# Patient Record
Sex: MALE | Race: WHITE | Employment: OTHER | URBAN - NONMETROPOLITAN AREA
[De-identification: names, ages, dates, MRNs, and addresses within clinical notes are randomized per-mention and may not be internally consistent; named-entity substitution may affect disease eponyms.]

---

## 2018-06-05 ENCOUNTER — HOSPITAL ENCOUNTER (OUTPATIENT)
Dept: GENERAL RADIOLOGY | Age: 83
Discharge: HOME | End: 2018-06-05
Payer: MEDICARE

## 2018-06-05 ENCOUNTER — HOSPITAL ENCOUNTER (OUTPATIENT)
Age: 83
Discharge: HOME | End: 2018-06-05
Payer: MEDICARE

## 2018-06-05 DIAGNOSIS — M54.5 LOW BACK PAIN, UNSPECIFIED BACK PAIN LATERALITY, UNSPECIFIED CHRONICITY, WITH SCIATICA PRESENCE UNSPECIFIED: ICD-10-CM

## 2018-06-05 PROCEDURE — 72100 X-RAY EXAM L-S SPINE 2/3 VWS: CPT

## 2018-06-20 ENCOUNTER — HOSPITAL ENCOUNTER (OUTPATIENT)
Dept: MRI IMAGING | Age: 83
Discharge: HOME | End: 2018-06-20
Payer: MEDICARE

## 2018-06-20 DIAGNOSIS — R93.7 ABNORMAL X-RAY OF LUMBAR SPINE: ICD-10-CM

## 2018-06-20 DIAGNOSIS — M54.40 ACUTE BACK PAIN WITH SCIATICA, UNSPECIFIED LATERALITY: ICD-10-CM

## 2018-06-20 PROCEDURE — 72148 MRI LUMBAR SPINE W/O DYE: CPT

## 2018-07-09 RX ORDER — METHYLPREDNISOLONE ACETATE 80 MG/ML
80 INJECTION, SUSPENSION INTRA-ARTICULAR; INTRALESIONAL; INTRAMUSCULAR; SOFT TISSUE ONCE
Status: CANCELLED | OUTPATIENT
Start: 2018-07-09 | End: 2018-07-09

## 2018-07-09 RX ORDER — BUPIVACAINE HYDROCHLORIDE 2.5 MG/ML
5 INJECTION, SOLUTION EPIDURAL; INFILTRATION; INTRACAUDAL ONCE
Status: CANCELLED | OUTPATIENT
Start: 2018-07-09 | End: 2018-07-09

## 2018-07-10 ENCOUNTER — HOSPITAL ENCOUNTER (OUTPATIENT)
Dept: INTERVENTIONAL RADIOLOGY/VASCULAR | Age: 83
Discharge: HOME | End: 2018-07-10
Payer: MEDICARE

## 2018-07-10 VITALS
SYSTOLIC BLOOD PRESSURE: 137 MMHG | HEART RATE: 63 BPM | TEMPERATURE: 98.2 F | DIASTOLIC BLOOD PRESSURE: 82 MMHG | RESPIRATION RATE: 16 BRPM | OXYGEN SATURATION: 95 %

## 2018-07-10 PROCEDURE — 6360000002 HC RX W HCPCS

## 2018-07-10 PROCEDURE — 6360000004 HC RX CONTRAST MEDICATION: Performed by: RADIOLOGY

## 2018-07-10 PROCEDURE — 62323 NJX INTERLAMINAR LMBR/SAC: CPT | Performed by: RADIOLOGY

## 2018-07-10 PROCEDURE — 2500000003 HC RX 250 WO HCPCS

## 2018-07-10 RX ORDER — BUPIVACAINE HYDROCHLORIDE 2.5 MG/ML
5 INJECTION, SOLUTION EPIDURAL; INFILTRATION; INTRACAUDAL ONCE
Status: COMPLETED | OUTPATIENT
Start: 2018-07-10 | End: 2018-07-10

## 2018-07-10 RX ORDER — LISINOPRIL 2.5 MG/1
2.5 TABLET ORAL DAILY
Status: ON HOLD | COMMUNITY
End: 2021-06-14 | Stop reason: ALTCHOICE

## 2018-07-10 RX ORDER — BENZONATATE 100 MG/1
200 CAPSULE ORAL 3 TIMES DAILY PRN
Status: ON HOLD | COMMUNITY
End: 2018-10-29 | Stop reason: ALTCHOICE

## 2018-07-10 RX ORDER — CETIRIZINE HYDROCHLORIDE 10 MG/1
10 TABLET ORAL DAILY
Status: ON HOLD | COMMUNITY
End: 2018-10-29 | Stop reason: ALTCHOICE

## 2018-07-10 RX ORDER — SPIRONOLACTONE 25 MG
20 TABLET ORAL
COMMUNITY

## 2018-07-10 RX ORDER — GLIMEPIRIDE 4 MG/1
2 TABLET ORAL NIGHTLY
COMMUNITY

## 2018-07-10 RX ORDER — IPRATROPIUM BROMIDE 42 UG/1
2 SPRAY, METERED NASAL 4 TIMES DAILY
Status: ON HOLD | COMMUNITY
End: 2018-10-29 | Stop reason: ALTCHOICE

## 2018-07-10 RX ORDER — METHYLPREDNISOLONE ACETATE 80 MG/ML
80 INJECTION, SUSPENSION INTRA-ARTICULAR; INTRALESIONAL; INTRAMUSCULAR; SOFT TISSUE ONCE
Status: COMPLETED | OUTPATIENT
Start: 2018-07-10 | End: 2018-07-10

## 2018-07-10 RX ADMIN — IOHEXOL 1 ML: 180 INJECTION INTRAVENOUS at 15:05

## 2018-07-10 RX ADMIN — METHYLPREDNISOLONE ACETATE 80 MG: 80 INJECTION, SUSPENSION INTRA-ARTICULAR; INTRALESIONAL; INTRAMUSCULAR; SOFT TISSUE at 15:06

## 2018-07-10 RX ADMIN — BUPIVACAINE HYDROCHLORIDE 2 ML: 2.5 INJECTION, SOLUTION EPIDURAL; INFILTRATION; INTRACAUDAL at 15:06

## 2018-07-10 ASSESSMENT — PAIN DESCRIPTION - PAIN TYPE
TYPE: CHRONIC PAIN
TYPE: CHRONIC PAIN

## 2018-07-10 ASSESSMENT — PAIN DESCRIPTION - ORIENTATION
ORIENTATION: LEFT;LOWER
ORIENTATION: LEFT;LOWER

## 2018-07-10 ASSESSMENT — PAIN DESCRIPTION - LOCATION
LOCATION: BACK;BUTTOCKS
LOCATION: BACK;BUTTOCKS

## 2018-07-10 ASSESSMENT — PAIN SCALES - GENERAL
PAINLEVEL_OUTOF10: 0
PAINLEVEL_OUTOF10: 0
PAINLEVEL_OUTOF10: 2

## 2018-07-10 ASSESSMENT — PAIN DESCRIPTION - DESCRIPTORS
DESCRIPTORS: DISCOMFORT
DESCRIPTORS: ACHING
DESCRIPTORS: ACHING

## 2018-07-10 ASSESSMENT — PAIN - FUNCTIONAL ASSESSMENT: PAIN_FUNCTIONAL_ASSESSMENT: 0-10

## 2018-07-10 NOTE — PROGRESS NOTES
1540 Pt denies new numbness. Pt ambulating same as prior to procedure with no complaints. Vitals stable. bandaid clean, dry and intact. 1545 pt now having complaints of numbness in bilateral back of thighs. Pt advised to sit down. Pt states it doesn't effect his walking he just feels the numbness. 1600 pt still having complaints of slight numbness. Walking not effected. [facility] called. Pt educated and verbalized understanding. Pt discharged via wheelchair to [facility] with instructions.

## 2018-07-18 ENCOUNTER — HOSPITAL ENCOUNTER (OUTPATIENT)
Dept: PHYSICAL THERAPY | Age: 83
Setting detail: THERAPIES SERIES
Discharge: HOME | End: 2018-07-18
Payer: MEDICARE

## 2018-07-18 PROCEDURE — G8979 MOBILITY GOAL STATUS: HCPCS

## 2018-07-18 PROCEDURE — G8978 MOBILITY CURRENT STATUS: HCPCS

## 2018-07-18 PROCEDURE — 97110 THERAPEUTIC EXERCISES: CPT

## 2018-07-18 PROCEDURE — 97161 PT EVAL LOW COMPLEX 20 MIN: CPT
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
## 2018-07-25 ENCOUNTER — HOSPITAL ENCOUNTER (OUTPATIENT)
Dept: PHYSICAL THERAPY | Age: 83
Setting detail: THERAPIES SERIES
Discharge: HOME | End: 2018-07-25
Payer: MEDICARE

## 2018-07-25 ENCOUNTER — APPOINTMENT (OUTPATIENT)
Dept: PHYSICAL THERAPY | Age: 83
End: 2018-07-25
Payer: MEDICARE

## 2018-07-25 PROCEDURE — 97110 THERAPEUTIC EXERCISES: CPT

## 2018-07-25 NOTE — PROGRESS NOTES
[place]     Time In: 1430  Time Out: 8549  Minutes: 45  Timed Code Treatment Minutes: 45 Minutes     Date: 2018  Patient Name: Idalia Francisco,  Gender:  male        CSN: [id number]   : 1935  (80 y.o.)       Referring Practitioner: Jason Fish MD      Diagnosis: M54.40- Lumbago with sciatica, unspecified side; acute low back pain; R93.7- abnormal MRI, lumbar spine  Treatment Diagnosis: chronic lumbar pain, core and hip weakness   Additional Pertinent Hx: Macular degeneration with right eye blindness, HTN controlled with medication, diabetes. General:  PT Visit Information  Onset Date: 18  PT Insurance Information: Medicare- [employer], unlimited visits based on medical necessity and cap; aquatics and modalities covered except ionto, HP/CP  Total # of Visits to Date: 2  Plan of Care/Certification Expiration Date: 18  Progress Note Counter:  for PN. Subjective:  Chart Reviewed: Yes  Patient assessed for rehabilitation services?: Yes  Response To Previous Treatment: Patient with no complaints from previous session. Family / Caregiver Present: No  Comments: Ortho neuro appt 18. MRI showed bulging disc and arthritis. Follow up with Dr. Oar Cai every 6 months. Subjective: Patient states his pain is when he does prolong walking. Pain is to left buttock area. When sitting he has no pain. Patient states piriformis stretch increases pain. Pain:  Patient Currently in Pain: Yes  Objective    Exercises  Exercise 1: HOLD increase pain per patient. L piriformis (knee to opp axilla) 3x15 sec hold  Exercise 2: On moist heat: Abdominal bracing 10 x5 sec hold, UE x 10, with march 2 x5, adductor squeezes with ball x 10 hold x 5, SLR, heel slides x10, abduction green x 10 (issued for home)  Exercise 4: Education: Log Roll transfer, use bracing with bed mobility and sit to stand. Seated posture with towel roll. Exercise 5: Seated DLS: abdominal bracing x 10 hold x 5. Activity Tolerance: Tolerated well with no pain after session. Assessment: Body structures, Functions, Activity limitations: Decreased functional mobility , Decreased ROM, Decreased strength, Decreased endurance  Assessment: Progressed core strengthening with good tolerance and no pain. HEP issued along with green theraband for hip abduction in hooklying. Discharge Recommendations: Continue to assess pending progress    Patient Education:  Patient Education: DLS in supine and seated. Log Roll transfer, Use bracing at home. Plan:  Times per week: 2x  Plan weeks: 8 weeks  Specific instructions for Next Treatment: progress to seated and standing DLS, review HEP  Current Treatment Recommendations: Strengthening, Manual Therapy - Soft Tissue Mobilization, Modalities, Home Exercise Program, Patient/Caregiver Education & Training, Aquatics  Plan Comment: Continue per POC. Goals:  Patient goals : Decrease/get rid of pain    Short term goals  Time Frame for Short term goals: 4 weeks  Short term goal 1: Pt will report <2/10 lumbar pain without L thigh numbness at worst to tolerate static standing and walking >100 yards. Short term goal 2: Pt will improve B hip strength to 5/5 for stabilization when lifting and climbing stairs. Short term goal 3: Pt will demo good core engagement and postural awareness with <2 verbal cues during therapy session to carry over to standing and walking to tolerate longer durations. Long term goals  Time Frame for Long term goals : 8 weeks  Long term goal 1: Pt will be independent and compliant with HEP to achieve above goals. Long term goal 2: Pt will improve Oswestry score from 11% to 5%.   Tiffany García  PTA [id number]

## 2018-07-26 ENCOUNTER — HOSPITAL ENCOUNTER (OUTPATIENT)
Dept: INTERVENTIONAL RADIOLOGY/VASCULAR | Age: 83
Discharge: HOME | End: 2018-07-26
Payer: MEDICARE

## 2018-07-26 DIAGNOSIS — I70.213 INTERMITTENT CLAUDICATION OF BOTH LOWER EXTREMITIES DUE TO ATHEROSCLEROSIS (HCC): ICD-10-CM

## 2018-07-26 PROCEDURE — 93923 UPR/LXTR ART STDY 3+ LVLS: CPT

## 2018-07-27 ENCOUNTER — HOSPITAL ENCOUNTER (OUTPATIENT)
Dept: PHYSICAL THERAPY | Age: 83
Setting detail: THERAPIES SERIES
Discharge: HOME | End: 2018-07-27
Payer: MEDICARE

## 2018-07-27 PROCEDURE — 97110 THERAPEUTIC EXERCISES: CPT

## 2018-07-27 NOTE — PROGRESS NOTES
[address]     Time In: 6025  Time Out: [address] [facility]  Minutes: 38  Timed Code Treatment Minutes: 38 Minutes     Date: 2018  Patient Name: Jojo Calzada,  Gender:  male        CSN: [id number]   : 1935  (80 y.o.)       Referring Practitioner: Nahid Quiñones MD      Diagnosis: M54.40- Lumbago with sciatica, unspecified side; acute low back pain; R93.7- abnormal MRI, lumbar spine  Treatment Diagnosis: chronic lumbar pain, core and hip weakness   Additional Pertinent Hx: Macular degeneration with right eye blindness, HTN controlled with medication, diabetes. General:  PT Visit Information  Onset Date: 18  PT Insurance Information: Medicare- [employer], unlimited visits based on medical necessity and cap; aquatics and modalities covered except ionto, HP/CP  Total # of Visits to Date: 3  Plan of Care/Certification Expiration Date: 18  Progress Note Counter: 3/9 for PN. Subjective:  Chart Reviewed: Yes  Patient assessed for rehabilitation services?: Yes  Response To Previous Treatment: Patient with no complaints from previous session. Family / Caregiver Present: No  Comments: Ortho neuro appt 18. MRI showed bulging disc and arthritis. Follow up with Dr. Toma Brown every 6 months. Subjective: I didn't do my exs yesterday. I was too busy. I don't have any pain now, but it was about 2/10 walking in here. Mild \"funny\" feeling L lateral calf. Pain:  Patient Currently in Pain: No         Objective         Exercises  Exercise 1: HOLD increase pain per patient. L piriformis (knee to opp axilla) 3x15 sec hold  DID do modified in sitting LLE opposite knee to opposire shoulder x 3 hold 10 sec.    Exercise 2: On moist heat: Abdominal bracing 10 x5 sec hold, UE x 10, with march 2 x5, adductor squeezes with ball x 10 hold x 5, SLR, heel slides x10, abduction green x 15 (issued for home)  Exercise 3: Nustep S8 , A11 L1 x5 min with bracing New machine   Exercise 4: Education: Log Roll transfer, use bracing with bed mobility and sit to stand. Seated posture with towel roll reviewed- pt not \"doing yet. \"  Exercise 5: Seated with MHP at LB : DLS: abdominal bracing with marching and LAQ x 10 B         Activity Tolerance:  Activity Tolerance: Patient Tolerated treatment well  Activity Tolerance: Mild discomfort with piriformis stretch. Performed modified in chair. \"It's not too bad. \"    Assessment: Body structures, Functions, Activity limitations: Decreased functional mobility , Decreased ROM, Decreased strength, Decreased endurance  Assessment: No increased LBP or radicaulr symptoms throughout session. Modified piriformis stretch performed seated. Monitor pain and consistency with HEP emphasized. Prognosis: Good  Discharge Recommendations: Continue to assess pending progress    Patient Education:  Patient Education: HEP, heat, posture-use towel roll. Seated marching and LAQ with abd bracing                       Plan:  Times per week: 2x  Plan weeks: 8 weeks  Specific instructions for Next Treatment: progress to seated and standing DLS, review HEP  Current Treatment Recommendations: Strengthening, Manual Therapy - Soft Tissue Mobilization, Modalities, Home Exercise Program, Patient/Caregiver Education & Training, Aquatics  Plan Comment: Continue per POC. Goals:  Patient goals : Decrease/get rid of pain    Short term goals  Time Frame for Short term goals: 4 weeks  Short term goal 1: Pt will report <2/10 lumbar pain without L thigh numbness at worst to tolerate static standing and walking >100 yards. Short term goal 2: Pt will improve B hip strength to 5/5 for stabilization when lifting and climbing stairs.    Short term goal 3: Pt will demo good core engagement and postural awareness with <2 verbal cues during therapy session to carry over to standing and walking to tolerate longer

## 2018-07-30 ENCOUNTER — HOSPITAL ENCOUNTER (OUTPATIENT)
Dept: PHYSICAL THERAPY | Age: 83
Setting detail: THERAPIES SERIES
Discharge: HOME | End: 2018-07-30
Payer: MEDICARE

## 2018-07-30 PROCEDURE — 97110 THERAPEUTIC EXERCISES: CPT

## 2018-07-30 NOTE — PROGRESS NOTES
achieve above goals. Long term goal 2: Pt will improve Oswestry score from 11% to 5%.          Pepe Gutiérrez, PT

## 2018-08-01 ENCOUNTER — HOSPITAL ENCOUNTER (OUTPATIENT)
Dept: PHYSICAL THERAPY | Age: 83
Setting detail: THERAPIES SERIES
Discharge: HOME | End: 2018-08-01
Payer: MEDICARE

## 2018-08-01 PROCEDURE — 97110 THERAPEUTIC EXERCISES: CPT

## 2018-08-01 NOTE — PROGRESS NOTES
not \"doing yet. \"  Exercise 5: Seated with MHP at LB :  abdominal bracing with marching and LAQ x 10 B, hip abd with green t band x 15, hip add with ball x 15  Exercise 6: Standing with bracing: heel  raisess, march, 3 way hip, mini-squats x10 each          Activity Tolerance:  Activity Tolerance: Patient Tolerated treatment well  Activity Tolerance: 0 pain, radicular symptoms    Assessment: Body structures, Functions, Activity limitations: Decreased functional mobility , Decreased ROM, Decreased strength, Decreased endurance  Assessment: Updated HEP. Occ cues for abd bracing throughout session. Good log roll technque demo. Prognosis: Good  Discharge Recommendations: Continue to assess pending progress    Patient Education:  Patient Education: HEP, abd bracing, use of heat, monitor pain/symptoms                      Plan:  Times per week: 2x  Plan weeks: 8 weeks  Current Treatment Recommendations: Strengthening, Manual Therapy - Soft Tissue Mobilization, Modalities, Home Exercise Program, Patient/Caregiver Education & Training, Aquatics  Plan Comment: Continue per POC. Goals:  Patient goals : Decrease/get rid of pain    Short term goals  Time Frame for Short term goals: 4 weeks  Short term goal 1: Pt will report <2/10 lumbar pain without L thigh numbness at worst to tolerate static standing and walking >100 yards. Short term goal 2: Pt will improve B hip strength to 5/5 for stabilization when lifting and climbing stairs. Short term goal 3: Pt will demo good core engagement and postural awareness with <2 verbal cues during therapy session to carry over to standing and walking to tolerate longer durations. Long term goals  Time Frame for Long term goals : 8 weeks  Long term goal 1: Pt will be independent and compliant with HEP to achieve above goals. Long term goal 2: Pt will improve Oswestry score from 11% to 5%.          Beck Jean  [id number]

## 2018-08-06 ENCOUNTER — APPOINTMENT (OUTPATIENT)
Dept: PHYSICAL THERAPY | Age: 83
End: 2018-08-06
Payer: MEDICARE

## 2018-08-08 ENCOUNTER — HOSPITAL ENCOUNTER (OUTPATIENT)
Dept: PHYSICAL THERAPY | Age: 83
Setting detail: THERAPIES SERIES
Discharge: HOME | End: 2018-08-08
Payer: MEDICARE

## 2018-08-08 PROCEDURE — 97110 THERAPEUTIC EXERCISES: CPT

## 2018-08-08 NOTE — PROGRESS NOTES
[place]     Time In: 1300  Time Out: [address] [facility]  Minutes: 40  Timed Code Treatment Minutes: 40 Minutes     Date: 2018  Patient Name: Massimo Olivarez,  Gender:  male        CSN: [id number]   : 1935  (80 y.o.)       Referring Practitioner: Sydney Mercado MD      Diagnosis: M54.40- Lumbago with sciatica, unspecified side; acute low back pain; R93.7- abnormal MRI, lumbar spine  Treatment Diagnosis: chronic lumbar pain, core and hip weakness   Additional Pertinent Hx: Macular degeneration with right eye blindness, HTN controlled with medication, diabetes. General:  PT Visit Information  Onset Date: 18  PT Insurance Information: Medicare- [facility], unlimited visits based on medical necessity and cap; aquatics and modalities covered except ionto, HP/CP  Total # of Visits to Date: 6  Plan of Care/Certification Expiration Date: 18  Progress Note Counter:  for PN. Subjective:  Chart Reviewed: Yes  Patient assessed for rehabilitation services?: Yes  Response To Previous Treatment: Patient with no complaints from previous session. Family / Caregiver Present: No  Comments: Ortho neuro appt 18. MRI showed bulging disc and arthritis. Follow up with Dr. Katharine Medeiros every 6 months. Subjective: I don't have any pain right now. o radicualr symptoms. \"Just feels cold above my L ankel. 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 ( thigh). Pain:  Patient Currently in Pain: No         Objective           Exercises  Exercise 2:  Abdominal bracing and  ,   SLR  x10 cues for technique   Exercise 3: Nustep S10, A11 L-2  x 6  min with bracing  Exercise 4: Education: Log Roll transfer, use bracing with bed mobility and sit to stand. Seated posture with towel roll reviewed- pt not \"doing yet. \"  Exercise 5: Seated on silver disc[de-identified]  abdominal bracing with

## 2018-08-10 ENCOUNTER — HOSPITAL ENCOUNTER (OUTPATIENT)
Dept: PHYSICAL THERAPY | Age: 83
Setting detail: THERAPIES SERIES
Discharge: HOME | End: 2018-08-10
Payer: MEDICARE

## 2018-08-10 PROCEDURE — 97110 THERAPEUTIC EXERCISES: CPT

## 2018-08-10 NOTE — PROGRESS NOTES
Exercise 8: hydro hip L- 4 x 15 B abd bracing   Exercise 9: Ended hooklying x 2-3  min  at end monitor LLE pain :I'm just tired. \"          Activity Tolerance:  Activity Tolerance: Patient Tolerated treatment well  Activity Tolerance: 0 pain, radicular symptoms-\"just tired. \"    Assessment: Body structures, Functions, Activity limitations: Decreased functional mobility , Decreased ROM, Decreased strength, Decreased endurance  Assessment: 30 min session today. Pt reports \" no worse pain\" after session. Focus on abd bracing and body mechanics. Stretch and use heat at home tonight. Prognosis: Good       Patient Education:  Patient Education: HEP, abd bracing, use of heat, monitor pain/symptoms           Plan:  Times per week: 2x  Plan weeks: 8 weeks  Current Treatment Recommendations: Strengthening, Manual Therapy - Soft Tissue Mobilization, Modalities, Home Exercise Program, Patient/Caregiver Education & Training, Aquatics  Plan Comment: Continue per POC. Goals:  Patient goals : Decrease/get rid of pain    Short term goals  Time Frame for Short term goals: 4 weeks  Short term goal 1: Pt will report <2/10 lumbar pain without L thigh numbness at worst to tolerate static standing and walking >100 yards. Short term goal 2: Pt will improve B hip strength to 5/5 for stabilization when lifting and climbing stairs. Short term goal 3: Pt will demo good core engagement and postural awareness with <2 verbal cues during therapy session to carry over to standing and walking to tolerate longer durations. Long term goals  Time Frame for Long term goals : 8 weeks  Long term goal 1: Pt will be independent and compliant with HEP to achieve above goals. Long term goal 2: Pt will improve Oswestry score from 11% to 5%.          Nishant Daley  [id number]

## 2018-08-13 ENCOUNTER — HOSPITAL ENCOUNTER (OUTPATIENT)
Dept: PHYSICAL THERAPY | Age: 83
Setting detail: THERAPIES SERIES
Discharge: HOME | End: 2018-08-13
Payer: MEDICARE

## 2018-08-13 PROCEDURE — 97110 THERAPEUTIC EXERCISES: CPT

## 2018-08-13 ASSESSMENT — PAIN DESCRIPTION - PAIN TYPE: TYPE: CHRONIC PAIN

## 2018-08-13 ASSESSMENT — PAIN DESCRIPTION - LOCATION: LOCATION: BACK;BUTTOCKS

## 2018-08-13 ASSESSMENT — PAIN SCALES - GENERAL: PAINLEVEL_OUTOF10: 3

## 2018-08-13 ASSESSMENT — PAIN DESCRIPTION - ORIENTATION: ORIENTATION: LEFT

## 2018-08-13 NOTE — PROGRESS NOTES
[place]     Time In: 1300  Time Out: 1340  Minutes: 40  Timed Code Treatment Minutes: 40 Minutes     Date: 2018  Patient Name: Alan Stoner,  Gender:  male        CSN: [id number]   : 1935  (80 y.o.)       Referring Practitioner: Bryan Canchola MD      Diagnosis: M54.40- Lumbago with sciatica, unspecified side; acute low back pain; R93.7- abnormal MRI, lumbar spine  Treatment Diagnosis: chronic lumbar pain, core and hip weakness   Additional Pertinent Hx: Macular degeneration with right eye blindness, HTN controlled with medication, diabetes. General:  PT Visit Information  Onset Date: 18  PT Insurance Information: Medicare- [employer], unlimited visits based on medical necessity and cap; aquatics and modalities covered except ionto, HP/CP  Total # of Visits to Date: 8  Plan of Care/Certification Expiration Date: 18  Progress Note Counter:  for PN. Subjective:  Chart Reviewed: Yes  Patient assessed for rehabilitation services?: Yes  Response To Previous Treatment: Patient with no complaints from previous session. Family / Caregiver Present: No  Comments: Ortho neuro appt 18. MRI showed bulging disc and arthritis. Follow up with Dr. Sonya García every 6 months. Subjective: Pt reports \"yesterday was better than today. I laid on a heatinig pad today. It just aches. Hurts wehn I walk across the parking lot in my butt. \"     Pain:  Patient Currently in Pain: Yes  Pain Level: 3  Pain Type: Chronic pain  Pain Location: Back, Buttocks  Pain Orientation: Left      Objective             Exercises  Exercise 2:  Abdominal bracing and  march x15, B  SLR  x10 cues for technique   Exercise 3: Nustep S10, A11 L-3 x 7  min with bracing  Exercise 4: Education: Log Roll transfer, use bracing with bed mobility and sit to stand.  Seated posture with towel roll reviewed \"Not doing in

## 2018-08-17 ENCOUNTER — HOSPITAL ENCOUNTER (OUTPATIENT)
Dept: PHYSICAL THERAPY | Age: 83
Setting detail: THERAPIES SERIES
Discharge: HOME | End: 2018-08-17
Payer: MEDICARE

## 2018-08-17 PROCEDURE — G8979 MOBILITY GOAL STATUS: HCPCS

## 2018-08-17 PROCEDURE — G8980 MOBILITY D/C STATUS: HCPCS

## 2018-08-17 PROCEDURE — 97110 THERAPEUTIC EXERCISES: CPT

## 2018-08-17 ASSESSMENT — PAIN DESCRIPTION - ORIENTATION: ORIENTATION: LEFT

## 2018-08-17 ASSESSMENT — PAIN SCALES - GENERAL: PAINLEVEL_OUTOF10: 1

## 2018-08-17 ASSESSMENT — PAIN DESCRIPTION - LOCATION: LOCATION: BACK;BUTTOCKS

## 2018-08-17 NOTE — PROGRESS NOTES
[place]     Time In: 7189  Time Out: [address]  Minutes: 40  Timed Code Treatment Minutes: 40 Minutes     Date: 2018  Patient Name: Areli Casey,  Gender:  male        CSN: [id number]   : 1935  (80 y.o.)       Referring Practitioner: Anastasia Leong MD      Diagnosis: M54.40- Lumbago with sciatica, unspecified side; acute low back pain; R93.7- abnormal MRI, lumbar spine  Treatment Diagnosis: chronic lumbar pain, core and hip weakness   Additional Pertinent Hx: Macular degeneration with right eye blindness, HTN controlled with medication, diabetes. General:  PT Visit Information  Onset Date: 18  PT Insurance Information: Medicare- [facility], unlimited visits based on medical necessity and cap; aquatics and modalities covered except ionto, HP/CP  Total # of Visits to Date: 9  Plan of Care/Certification Expiration Date: 18  Progress Note Counter:  for PN. Subjective:  Chart Reviewed: Yes  Comments: Ortho neuro appt 18. MRI showed bulging disc and arthritis. Follow up with Dr. Gasper Alvarez every 6 months. Subjective: Pt reports feeling tired this morning from traveling to [facility] yesterday to see doctor and attend choir practice, not getting home until late. Pt reports Dr. Larisa Colbert recommends microdiscectomy and pt plans to schedule. Pt reports having lumbar arthritis and stenosis. Pt c/o low back ache and mild left buttock pain walking into clinic. Pt reports left leg tires quicker than right. Pain:  Patient Currently in Pain: Yes  Pain Assessment: 0-10  Pain Level: 1  Pain Location: Back, Buttocks  Pain Orientation: Left      Objective         Exercises  Exercise 2:  Abdominal bracing with march x15, B  SLR B x10 cues for technique   Exercise 3: Nustep S10, A11 L-3 x 7  min with bracing  Exercise 4: Education: Log Roll transfer.   Exercise 5: Seated on blue foam: abdominal Interpretation:  0%-20%: minimal disability The patient can cope with most living activities. Usually no treatment is indicated apart from advice on lifting sitting and exercise. 21%-40%: moderate disability The patient experiences more pain and difficulty with sitting, lifting and standing. Travel and social life are more difficult and they may be disabled from work. Personal care, sexual activity and sleeping are not grossly affected and the patient can usually be managed by conservative means. 41%-60%: severe disability Pain remains themain problem with this group but activities of daily living are affected. These patient require a detailed investigation. 61%-80%: crippled Back pain impinges on all aspects of the patient's life. Positive intervention is required. 81%-100%: These patients are either bed-bound or exaggerating their symptoms. OBDULIO Renteria and Aram URIBE (2000)  The Oswestry Disability Index. Spine, 80(36):2428-7647.     Cary Aviles PT

## 2018-08-31 ENCOUNTER — HOSPITAL ENCOUNTER (EMERGENCY)
Age: 83
Discharge: HOME | End: 2018-08-31
Attending: FAMILY MEDICINE
Payer: MEDICARE

## 2018-08-31 VITALS
WEIGHT: 225 LBS | HEIGHT: 67 IN | BODY MASS INDEX: 35.31 KG/M2 | DIASTOLIC BLOOD PRESSURE: 64 MMHG | HEART RATE: 74 BPM | TEMPERATURE: 98.2 F | SYSTOLIC BLOOD PRESSURE: 131 MMHG | RESPIRATION RATE: 18 BRPM | OXYGEN SATURATION: 96 %

## 2018-08-31 DIAGNOSIS — M79.89 BILATERAL HAND SWELLING: ICD-10-CM

## 2018-08-31 DIAGNOSIS — M19.042 PRIMARY OSTEOARTHRITIS OF BOTH HANDS: Primary | ICD-10-CM

## 2018-08-31 DIAGNOSIS — M19.041 PRIMARY OSTEOARTHRITIS OF BOTH HANDS: Primary | ICD-10-CM

## 2018-08-31 LAB
ALBUMIN SERPL-MCNC: 4.1 G/DL (ref 3.5–5.1)
ALP BLD-CCNC: 54 U/L (ref 38–126)
ALT SERPL-CCNC: 24 U/L (ref 11–66)
ANION GAP SERPL CALCULATED.3IONS-SCNC: 11 MEQ/L (ref 8–16)
AST SERPL-CCNC: 23 U/L (ref 5–40)
BASOPHILS # BLD: 0.7 %
BASOPHILS ABSOLUTE: 0 THOU/MM3 (ref 0–0.1)
BILIRUB SERPL-MCNC: 0.5 MG/DL (ref 0.3–1.2)
BUN BLDV-MCNC: 16 MG/DL (ref 7–22)
CALCIUM SERPL-MCNC: 9 MG/DL (ref 8.5–10.5)
CHLORIDE BLD-SCNC: 105 MEQ/L (ref 98–111)
CO2: 26 MEQ/L (ref 23–33)
CREAT SERPL-MCNC: 0.8 MG/DL (ref 0.4–1.2)
EOSINOPHIL # BLD: 3.1 %
EOSINOPHILS ABSOLUTE: 0.2 THOU/MM3 (ref 0–0.4)
ERYTHROCYTE [DISTWIDTH] IN BLOOD BY AUTOMATED COUNT: 13.4 % (ref 11.5–14.5)
ERYTHROCYTE [DISTWIDTH] IN BLOOD BY AUTOMATED COUNT: 50.7 FL (ref 35–45)
GFR SERPL CREATININE-BSD FRML MDRD: > 90 ML/MIN/1.73M2
GLUCOSE BLD-MCNC: 131 MG/DL (ref 70–108)
HCT VFR BLD CALC: 38 % (ref 42–52)
HEMOGLOBIN: 12.8 GM/DL (ref 14–18)
IMMATURE GRANS (ABS): 0.01 THOU/MM3 (ref 0–0.07)
IMMATURE GRANULOCYTES: 0.2 %
LYMPHOCYTES # BLD: 28.7 %
LYMPHOCYTES ABSOLUTE: 1.6 THOU/MM3 (ref 1–4.8)
MCH RBC QN AUTO: 34.5 PG (ref 26–33)
MCHC RBC AUTO-ENTMCNC: 33.7 GM/DL (ref 32.2–35.5)
MCV RBC AUTO: 102.4 FL (ref 80–94)
MONOCYTES # BLD: 12 %
MONOCYTES ABSOLUTE: 0.7 THOU/MM3 (ref 0.4–1.3)
NUCLEATED RED BLOOD CELLS: 0 /100 WBC
OSMOLALITY CALCULATION: 286.1 MOSMOL/KG (ref 275–300)
PLATELET # BLD: 235 THOU/MM3 (ref 130–400)
PMV BLD AUTO: 9.3 FL (ref 9.4–12.4)
POTASSIUM SERPL-SCNC: 4.4 MEQ/L (ref 3.5–5.2)
PRO-BNP: 382.6 PG/ML (ref 0–1800)
RBC # BLD: 3.71 MILL/MM3 (ref 4.7–6.1)
SEG NEUTROPHILS: 55.3 %
SEGMENTED NEUTROPHILS ABSOLUTE COUNT: 3.1 THOU/MM3 (ref 1.8–7.7)
SODIUM BLD-SCNC: 142 MEQ/L (ref 135–145)
TOTAL PROTEIN: 6.5 G/DL (ref 6.1–8)
WBC # BLD: 5.6 THOU/MM3 (ref 4.8–10.8)

## 2018-08-31 PROCEDURE — 83880 ASSAY OF NATRIURETIC PEPTIDE: CPT

## 2018-08-31 PROCEDURE — 36415 COLL VENOUS BLD VENIPUNCTURE: CPT

## 2018-08-31 PROCEDURE — 80053 COMPREHEN METABOLIC PANEL: CPT

## 2018-08-31 PROCEDURE — 85025 COMPLETE CBC W/AUTO DIFF WBC: CPT

## 2018-08-31 PROCEDURE — 99283 EMERGENCY DEPT VISIT LOW MDM: CPT

## 2018-08-31 ASSESSMENT — ENCOUNTER SYMPTOMS
RHINORRHEA: 0
WHEEZING: 0
VOMITING: 0
NAUSEA: 0
DIARRHEA: 0
EYE DISCHARGE: 0
COUGH: 0
BACK PAIN: 0
SORE THROAT: 0
SHORTNESS OF BREATH: 0
EYE REDNESS: 0
ABDOMINAL PAIN: 0

## 2018-08-31 NOTE — ED PROVIDER NOTES
[facility]  eMERGENCY dEPARTMENT eNCOUnter          CHIEF COMPLAINT       Chief Complaint   Patient presents with    Joint Swelling       Nurses Notes reviewed and I agree except as noted in the HPI. HISTORY OF PRESENT ILLNESS    Jesus Fearing is a 80 y.o. male who presents to the Emergency Department for the evaluation of bilateral hand swelling. The patient states that he feels that his \"fingers are fat, as if he is retaining water\". The patient states that his symptoms have been going on for the past several days. He reports that his symptoms are worse in the morning when he wakes up and improve throughout the day with use. The patient states that he has intermittent pain in his arm secondary to a pinched nerve in his neck. He has received some relief from readjustment by his chiropractor. The patient also has surgery scheduled for the 19th of September for a herniated disc to his lower back. The patient states that he does not use his hands as much as he used to. The patient admits arthritic left thumb pain. The patient denies any chest pain, shortness of breath, weight gain, nausea, vomiting, diarrhea, or any other signs or symptoms at this time. The patient has chronic back pain with exertion. The patient has a history of arthritis, arrhythmia, and disc herniation. He denies any history of CHF, CAD, kidney disease, liver disease, or any other pertinent history. The HPI was provided by the patient. REVIEW OF SYSTEMS     Review of Systems   Constitutional: Negative for appetite change, chills, fatigue and fever. HENT: Negative for congestion, ear pain, rhinorrhea and sore throat. Eyes: Negative for discharge, redness and visual disturbance. Respiratory: Negative for cough, shortness of breath and wheezing. Cardiovascular: Negative for chest pain, palpitations and leg swelling. Gastrointestinal: Negative for abdominal pain, diarrhea, nausea and vomiting. Genitourinary: Negative for decreased urine volume, difficulty urinating, dysuria and hematuria. Musculoskeletal: Positive for joint swelling (reports a fat feeling to his bilateral hands). Negative for arthralgias, back pain and neck pain. Skin: Negative for pallor and rash. Allergic/Immunologic: Negative for environmental allergies. Neurological: Negative for dizziness, syncope, weakness, light-headedness, numbness and headaches. Hematological: Negative for adenopathy. Psychiatric/Behavioral: Negative for confusion and suicidal ideas. The patient is not nervous/anxious. PAST MEDICAL HISTORY    has a past medical history of Hyperlipidemia; Hypertension; Macular degeneration, left eye; and Type II or unspecified type diabetes mellitus without mention of complication, not stated as uncontrolled. SURGICAL HISTORY      has a past surgical history that includes Hydrocele surgery; hernia repair (2008); Eye removal (2009); and Finger surgery. CURRENT MEDICATIONS       Discharge Medication List as of 8/31/2018  1:29 PM      CONTINUE these medications which have NOT CHANGED    Details   glimepiride (AMARYL) 4 MG tablet Take 4 mg by mouth every morning (before breakfast)Historical Med      !! lisinopril (PRINIVIL;ZESTRIL) 2.5 MG tablet Take 2.5 mg by mouth dailyHistorical Med      Lutein 20 MG CAPS Take 20 mg by mouthHistorical Med      simvastatin (ZOCOR) 20 MG tablet Historical Med      pioglitazone (ACTOS) 30 MG tablet Take 30 mg by mouth daily. fish oil-omega-3 fatty acids 1000 MG capsule Take 1 g by mouth 2 times daily. !! Multiple Vitamins-Minerals (VITEYES AREDS FORMULA/LUTEIN) CAPS Take  by mouth daily. Ginkgo Biloba 40 MG TABS Take  by mouth daily. !! Multiple Vitamins-Minerals (PRESERVISION/LUTEIN) CAPS Take 1 capsule by mouth daily. aspirin 81 MG EC tablet Take 81 mg by mouth daily. APPLE CIDER VINEGAR PO Take  by mouth daily.       doxazosin (CARDURA) 4 MG murmur heard. Pulmonary/Chest: Effort normal and breath sounds normal. No respiratory distress. He has no decreased breath sounds. He has no wheezes. He has no rhonchi. He has no rales. Abdominal: Soft. Bowel sounds are normal. He exhibits no distension. There is no tenderness. There is no rebound, no guarding and no CVA tenderness. Musculoskeletal: Normal range of motion. He exhibits no edema. Right hand: He exhibits normal range of motion, no tenderness, no bony tenderness, normal two-point discrimination, normal capillary refill, no deformity, no laceration and no swelling. Normal sensation noted. Normal strength noted. Left hand: He exhibits normal range of motion, no tenderness, no bony tenderness, normal two-point discrimination, normal capillary refill, no deformity, no laceration and no swelling. Normal sensation noted. Normal strength noted. Neurological: He is alert and oriented to person, place, and time. He exhibits normal muscle tone. Coordination normal.   Skin: Skin is warm and dry. No rash noted. He is not diaphoretic. Nursing note and vitals reviewed.       DIFFERENTIAL DIAGNOSIS:   Including but not limited to: CHF, liver disease, kidney disease, peripheral edema, gout, osteoarthritis, rheumatoid arthritis    DIAGNOSTIC RESULTS     EKG: All EKG's are interpreted by the Emergency Department Physician who either signs or Co-signs this chart in the absence of a cardiologist.  EKG interpreted by Sylvain Bond MD:    None    RADIOLOGY: non-plain film images(s) such as CT, Ultrasound and MRI are read by the radiologist.    No orders to display       LABS:   Labs Reviewed   CBC WITH AUTO DIFFERENTIAL - Abnormal; Notable for the following:        Result Value    RBC 3.71 (*)     Hemoglobin 12.8 (*)     Hematocrit 38.0 (*)     .4 (*)     MCH 34.5 (*)     RDW-SD 50.7 (*)     MPV 9.3 (*)     All other components within normal limits   COMPREHENSIVE METABOLIC PANEL - Abnormal;

## 2018-08-31 NOTE — ED TRIAGE NOTES
Pt to ED with c/o hand swelling with stiffness in the mornings x 1 week. Pt states when he first wakes up it is hard to open his hands and his rings are difficult to get off. States as the day goes on the symptoms improve. Pt denies further complications.

## 2018-09-04 ENCOUNTER — HOSPITAL ENCOUNTER (OUTPATIENT)
Dept: GENERAL RADIOLOGY | Age: 83
Discharge: HOME | End: 2018-09-04
Payer: MEDICARE

## 2018-09-04 ENCOUNTER — HOSPITAL ENCOUNTER (OUTPATIENT)
Age: 83
Discharge: HOME | End: 2018-09-04
Payer: MEDICARE

## 2018-09-04 DIAGNOSIS — Z01.818 PREOP TESTING: ICD-10-CM

## 2018-09-04 LAB
ANION GAP SERPL CALCULATED.3IONS-SCNC: 14 MEQ/L (ref 8–16)
APTT: 30.4 SECONDS (ref 22–38)
BASOPHILS # BLD: 0.7 %
BASOPHILS ABSOLUTE: 0.1 THOU/MM3 (ref 0–0.1)
BILIRUBIN URINE: NEGATIVE
BLOOD, URINE: NEGATIVE
BUN BLDV-MCNC: 25 MG/DL (ref 7–22)
CALCIUM SERPL-MCNC: 9 MG/DL (ref 8.5–10.5)
CHARACTER, URINE: CLEAR
CHLORIDE BLD-SCNC: 104 MEQ/L (ref 98–111)
CO2: 23 MEQ/L (ref 23–33)
COLOR: YELLOW
CREAT SERPL-MCNC: 0.8 MG/DL (ref 0.4–1.2)
EKG ATRIAL RATE: 77 BPM
EKG P AXIS: 43 DEGREES
EKG P-R INTERVAL: 192 MS
EKG Q-T INTERVAL: 402 MS
EKG QRS DURATION: 108 MS
EKG QTC CALCULATION (BAZETT): 454 MS
EKG R AXIS: -40 DEGREES
EKG T AXIS: 20 DEGREES
EKG VENTRICULAR RATE: 77 BPM
EOSINOPHIL # BLD: 2.1 %
EOSINOPHILS ABSOLUTE: 0.2 THOU/MM3 (ref 0–0.4)
ERYTHROCYTE [DISTWIDTH] IN BLOOD BY AUTOMATED COUNT: 13.8 % (ref 11.5–14.5)
ERYTHROCYTE [DISTWIDTH] IN BLOOD BY AUTOMATED COUNT: 52.9 FL (ref 35–45)
GFR SERPL CREATININE-BSD FRML MDRD: > 90 ML/MIN/1.73M2
GLUCOSE BLD-MCNC: 112 MG/DL (ref 70–108)
GLUCOSE URINE: NEGATIVE MG/DL
HCT VFR BLD CALC: 39.2 % (ref 42–52)
HEMOGLOBIN: 12.9 GM/DL (ref 14–18)
IMMATURE GRANS (ABS): 0.03 THOU/MM3 (ref 0–0.07)
IMMATURE GRANULOCYTES: 0.4 %
INR BLD: 1.07 (ref 0.85–1.13)
KETONES, URINE: ABNORMAL
LEUKOCYTE ESTERASE, URINE: NEGATIVE
LYMPHOCYTES # BLD: 27.5 %
LYMPHOCYTES ABSOLUTE: 2.1 THOU/MM3 (ref 1–4.8)
MCH RBC QN AUTO: 34.3 PG (ref 26–33)
MCHC RBC AUTO-ENTMCNC: 32.9 GM/DL (ref 32.2–35.5)
MCV RBC AUTO: 104.3 FL (ref 80–94)
MONOCYTES # BLD: 10.6 %
MONOCYTES ABSOLUTE: 0.8 THOU/MM3 (ref 0.4–1.3)
NITRITE, URINE: NEGATIVE
NUCLEATED RED BLOOD CELLS: 0 /100 WBC
PH UA: 5
PLATELET # BLD: 269 THOU/MM3 (ref 130–400)
PMV BLD AUTO: 9.8 FL (ref 9.4–12.4)
POTASSIUM SERPL-SCNC: 4.5 MEQ/L (ref 3.5–5.2)
PROTEIN UA: NEGATIVE
RBC # BLD: 3.76 MILL/MM3 (ref 4.7–6.1)
SEG NEUTROPHILS: 58.7 %
SEGMENTED NEUTROPHILS ABSOLUTE COUNT: 4.5 THOU/MM3 (ref 1.8–7.7)
SODIUM BLD-SCNC: 141 MEQ/L (ref 135–145)
SPECIFIC GRAVITY, URINE: 1.02 (ref 1–1.03)
UROBILINOGEN, URINE: 0.2 EU/DL
WBC # BLD: 7.6 THOU/MM3 (ref 4.8–10.8)

## 2018-09-04 PROCEDURE — 93010 ELECTROCARDIOGRAM REPORT: CPT | Performed by: NUCLEAR MEDICINE

## 2018-09-04 PROCEDURE — 85610 PROTHROMBIN TIME: CPT

## 2018-09-04 PROCEDURE — 80048 BASIC METABOLIC PNL TOTAL CA: CPT

## 2018-09-04 PROCEDURE — 85730 THROMBOPLASTIN TIME PARTIAL: CPT

## 2018-09-04 PROCEDURE — 36415 COLL VENOUS BLD VENIPUNCTURE: CPT

## 2018-09-04 PROCEDURE — 85025 COMPLETE CBC W/AUTO DIFF WBC: CPT

## 2018-09-04 PROCEDURE — 71046 X-RAY EXAM CHEST 2 VIEWS: CPT

## 2018-09-04 PROCEDURE — 81003 URINALYSIS AUTO W/O SCOPE: CPT

## 2018-09-04 PROCEDURE — 93005 ELECTROCARDIOGRAM TRACING: CPT | Performed by: FAMILY MEDICINE

## 2018-10-28 RX ORDER — MIDAZOLAM HYDROCHLORIDE 1 MG/ML
1 INJECTION INTRAMUSCULAR; INTRAVENOUS ONCE
Status: CANCELLED | OUTPATIENT
Start: 2018-10-28 | End: 2018-10-28

## 2018-10-28 RX ORDER — SODIUM CHLORIDE 450 MG/100ML
INJECTION, SOLUTION INTRAVENOUS CONTINUOUS
Status: CANCELLED | OUTPATIENT
Start: 2018-10-28

## 2018-10-28 RX ORDER — FENTANYL CITRATE 50 UG/ML
50 INJECTION, SOLUTION INTRAMUSCULAR; INTRAVENOUS ONCE
Status: CANCELLED | OUTPATIENT
Start: 2018-10-28 | End: 2018-10-28

## 2018-10-29 ENCOUNTER — HOSPITAL ENCOUNTER (OUTPATIENT)
Dept: INTERVENTIONAL RADIOLOGY/VASCULAR | Age: 83
Discharge: HOME | End: 2018-10-29
Attending: RADIOLOGY | Admitting: RADIOLOGY
Payer: MEDICARE

## 2018-10-29 VITALS
HEART RATE: 65 BPM | WEIGHT: 225 LBS | TEMPERATURE: 98.5 F | SYSTOLIC BLOOD PRESSURE: 139 MMHG | DIASTOLIC BLOOD PRESSURE: 74 MMHG | RESPIRATION RATE: 18 BRPM | HEIGHT: 67 IN | OXYGEN SATURATION: 95 % | BODY MASS INDEX: 35.31 KG/M2

## 2018-10-29 LAB
APTT: 30.2 SECONDS (ref 22–38)
CREAT SERPL-MCNC: 0.8 MG/DL (ref 0.4–1.2)
ERYTHROCYTE [DISTWIDTH] IN BLOOD BY AUTOMATED COUNT: 13.1 % (ref 11.5–14.5)
ERYTHROCYTE [DISTWIDTH] IN BLOOD BY AUTOMATED COUNT: 49.1 FL (ref 35–45)
GFR SERPL CREATININE-BSD FRML MDRD: > 90 ML/MIN/1.73M2
HCT VFR BLD CALC: 36.7 % (ref 42–52)
HEMOGLOBIN: 12.5 GM/DL (ref 14–18)
INR BLD: 1.06 (ref 0.85–1.13)
MCH RBC QN AUTO: 34.8 PG (ref 26–33)
MCHC RBC AUTO-ENTMCNC: 34.1 GM/DL (ref 32.2–35.5)
MCV RBC AUTO: 102.2 FL (ref 80–94)
PLATELET # BLD: 320 THOU/MM3 (ref 130–400)
PMV BLD AUTO: 9.2 FL (ref 9.4–12.4)
RBC # BLD: 3.59 MILL/MM3 (ref 4.7–6.1)
WBC # BLD: 8 THOU/MM3 (ref 4.8–10.8)

## 2018-10-29 PROCEDURE — 75716 ARTERY X-RAYS ARMS/LEGS: CPT

## 2018-10-29 PROCEDURE — 37221 HC PLASTY ILIAC W STENT: CPT

## 2018-10-29 PROCEDURE — 6370000000 HC RX 637 (ALT 250 FOR IP)

## 2018-10-29 PROCEDURE — C1887 CATHETER, GUIDING: HCPCS

## 2018-10-29 PROCEDURE — 6360000002 HC RX W HCPCS: Performed by: RADIOLOGY

## 2018-10-29 PROCEDURE — C1760 CLOSURE DEV, VASC: HCPCS

## 2018-10-29 PROCEDURE — 36415 COLL VENOUS BLD VENIPUNCTURE: CPT

## 2018-10-29 PROCEDURE — 6370000000 HC RX 637 (ALT 250 FOR IP): Performed by: RADIOLOGY

## 2018-10-29 PROCEDURE — 85027 COMPLETE CBC AUTOMATED: CPT

## 2018-10-29 PROCEDURE — 6360000004 HC RX CONTRAST MEDICATION: Performed by: RADIOLOGY

## 2018-10-29 PROCEDURE — 2580000003 HC RX 258: Performed by: RADIOLOGY

## 2018-10-29 PROCEDURE — 2709999900 HC NON-CHARGEABLE SUPPLY

## 2018-10-29 PROCEDURE — 75625 CONTRAST EXAM ABDOMINL AORTA: CPT

## 2018-10-29 PROCEDURE — 82565 ASSAY OF CREATININE: CPT

## 2018-10-29 PROCEDURE — C1725 CATH, TRANSLUMIN NON-LASER: HCPCS

## 2018-10-29 PROCEDURE — C1894 INTRO/SHEATH, NON-LASER: HCPCS

## 2018-10-29 PROCEDURE — 2500000003 HC RX 250 WO HCPCS

## 2018-10-29 PROCEDURE — C1769 GUIDE WIRE: HCPCS

## 2018-10-29 PROCEDURE — 6360000002 HC RX W HCPCS

## 2018-10-29 PROCEDURE — 85610 PROTHROMBIN TIME: CPT

## 2018-10-29 PROCEDURE — C1876 STENT, NON-COA/NON-COV W/DEL: HCPCS

## 2018-10-29 PROCEDURE — 85730 THROMBOPLASTIN TIME PARTIAL: CPT

## 2018-10-29 RX ORDER — FENTANYL CITRATE 50 UG/ML
50 INJECTION, SOLUTION INTRAMUSCULAR; INTRAVENOUS ONCE
Status: COMPLETED | OUTPATIENT
Start: 2018-10-29 | End: 2018-10-29

## 2018-10-29 RX ORDER — ACETAMINOPHEN 160 MG/5ML
60 SUSPENSION, ORAL (FINAL DOSE FORM) ORAL DAILY
COMMUNITY

## 2018-10-29 RX ORDER — FERROUS SULFATE 325(65) MG
325 TABLET ORAL
Status: ON HOLD | COMMUNITY
End: 2021-06-14 | Stop reason: ALTCHOICE

## 2018-10-29 RX ORDER — HEPARIN SODIUM 1000 [USP'U]/ML
3000 INJECTION, SOLUTION INTRAVENOUS; SUBCUTANEOUS ONCE
Status: COMPLETED | OUTPATIENT
Start: 2018-10-29 | End: 2018-10-29

## 2018-10-29 RX ORDER — MIDAZOLAM HYDROCHLORIDE 1 MG/ML
1 INJECTION INTRAMUSCULAR; INTRAVENOUS ONCE
Status: COMPLETED | OUTPATIENT
Start: 2018-10-29 | End: 2018-10-29

## 2018-10-29 RX ORDER — CEFAZOLIN SODIUM 1 G/50ML
1 INJECTION, SOLUTION INTRAVENOUS ONCE
Status: COMPLETED | OUTPATIENT
Start: 2018-10-29 | End: 2018-10-29

## 2018-10-29 RX ORDER — SODIUM CHLORIDE 450 MG/100ML
INJECTION, SOLUTION INTRAVENOUS CONTINUOUS
Status: DISCONTINUED | OUTPATIENT
Start: 2018-10-29 | End: 2018-10-29 | Stop reason: HOSPADM

## 2018-10-29 RX ORDER — DIAPER,BRIEF,INFANT-TODD,DISP
EACH MISCELLANEOUS ONCE
Status: COMPLETED | OUTPATIENT
Start: 2018-10-29 | End: 2018-10-29

## 2018-10-29 RX ORDER — CLOPIDOGREL BISULFATE 75 MG/1
75 TABLET ORAL ONCE
Status: COMPLETED | OUTPATIENT
Start: 2018-10-29 | End: 2018-10-29

## 2018-10-29 RX ORDER — DOCUSATE SODIUM 100 MG/1
100 CAPSULE, LIQUID FILLED ORAL 3 TIMES DAILY PRN
COMMUNITY

## 2018-10-29 RX ADMIN — SODIUM CHLORIDE: 4.5 INJECTION, SOLUTION INTRAVENOUS at 07:49

## 2018-10-29 RX ADMIN — IOPAMIDOL 200 ML: 612 INJECTION, SOLUTION INTRAVENOUS at 12:19

## 2018-10-29 RX ADMIN — MIDAZOLAM HYDROCHLORIDE 1 MG: 2 INJECTION, SOLUTION INTRAMUSCULAR; INTRAVENOUS at 11:38

## 2018-10-29 RX ADMIN — FENTANYL CITRATE 50 MCG: 50 INJECTION INTRAMUSCULAR; INTRAVENOUS at 11:38

## 2018-10-29 RX ADMIN — CLOPIDOGREL BISULFATE 75 MG: 75 TABLET ORAL at 12:19

## 2018-10-29 RX ADMIN — FENTANYL CITRATE 50 MCG: 50 INJECTION INTRAMUSCULAR; INTRAVENOUS at 11:04

## 2018-10-29 RX ADMIN — CEFAZOLIN SODIUM 1 G: 1 INJECTION, SOLUTION INTRAVENOUS at 13:18

## 2018-10-29 RX ADMIN — BACITRACIN ZINC 1 G: 500 OINTMENT TOPICAL at 12:18

## 2018-10-29 RX ADMIN — MIDAZOLAM HYDROCHLORIDE 1 MG: 2 INJECTION, SOLUTION INTRAMUSCULAR; INTRAVENOUS at 11:00

## 2018-10-29 RX ADMIN — HEPARIN SODIUM 3000 UNITS: 1000 INJECTION INTRAVENOUS; SUBCUTANEOUS at 11:37

## 2018-10-29 ASSESSMENT — PAIN SCALES - GENERAL
PAINLEVEL_OUTOF10: 3
PAINLEVEL_OUTOF10: 0

## 2018-11-06 ENCOUNTER — HOSPITAL ENCOUNTER (OUTPATIENT)
Dept: INTERVENTIONAL RADIOLOGY/VASCULAR | Age: 83
Discharge: HOME | End: 2018-11-06
Payer: MEDICARE

## 2018-11-06 DIAGNOSIS — I73.9 PVD (PERIPHERAL VASCULAR DISEASE) (HCC): ICD-10-CM

## 2018-11-06 PROCEDURE — 99212 OFFICE O/P EST SF 10 MIN: CPT

## 2018-12-06 ENCOUNTER — HOSPITAL ENCOUNTER (OUTPATIENT)
Dept: INTERVENTIONAL RADIOLOGY/VASCULAR | Age: 83
Discharge: HOME | End: 2018-12-06
Payer: MEDICARE

## 2018-12-06 DIAGNOSIS — Z98.890 STATUS POST PERCUTANEOUS ARTERIOGRAM: ICD-10-CM

## 2018-12-06 PROCEDURE — 99212 OFFICE O/P EST SF 10 MIN: CPT

## 2019-01-03 ENCOUNTER — PROCEDURE VISIT (OUTPATIENT)
Dept: NEUROLOGY | Age: 84
End: 2019-01-03
Payer: MEDICARE

## 2019-01-03 DIAGNOSIS — G56.03 BILATERAL CARPAL TUNNEL SYNDROME: Primary | ICD-10-CM

## 2019-01-03 DIAGNOSIS — M54.12 CERVICAL RADICULOPATHY: ICD-10-CM

## 2019-01-03 DIAGNOSIS — R20.0 BILATERAL HAND NUMBNESS: ICD-10-CM

## 2019-01-03 PROCEDURE — 95911 NRV CNDJ TEST 9-10 STUDIES: CPT | Performed by: PSYCHIATRY & NEUROLOGY

## 2019-01-03 PROCEDURE — 95886 MUSC TEST DONE W/N TEST COMP: CPT | Performed by: PSYCHIATRY & NEUROLOGY

## 2019-01-29 ENCOUNTER — HOSPITAL ENCOUNTER (OUTPATIENT)
Dept: INTERVENTIONAL RADIOLOGY/VASCULAR | Age: 84
Discharge: HOME | End: 2019-01-29
Payer: MEDICARE

## 2019-01-29 DIAGNOSIS — I73.9 PVD (PERIPHERAL VASCULAR DISEASE) (HCC): ICD-10-CM

## 2019-01-29 PROCEDURE — 99212 OFFICE O/P EST SF 10 MIN: CPT

## 2019-01-29 PROCEDURE — 93922 UPR/L XTREMITY ART 2 LEVELS: CPT

## 2019-04-29 ENCOUNTER — HOSPITAL ENCOUNTER (OUTPATIENT)
Dept: INTERVENTIONAL RADIOLOGY/VASCULAR | Age: 84
Discharge: HOME | End: 2019-04-29
Payer: MEDICARE

## 2019-04-29 ENCOUNTER — HOSPITAL ENCOUNTER (OUTPATIENT)
Dept: WOMENS IMAGING | Age: 84
Discharge: HOME | End: 2019-04-29
Payer: MEDICARE

## 2019-04-29 ENCOUNTER — HOSPITAL ENCOUNTER (OUTPATIENT)
Age: 84
Discharge: HOME | End: 2019-04-29
Payer: MEDICARE

## 2019-04-29 ENCOUNTER — APPOINTMENT (OUTPATIENT)
Dept: WOMENS IMAGING | Age: 84
End: 2019-04-29
Payer: MEDICARE

## 2019-04-29 DIAGNOSIS — I73.9 PVD (PERIPHERAL VASCULAR DISEASE) (HCC): ICD-10-CM

## 2019-04-29 DIAGNOSIS — N62 GYNECOMASTIA: ICD-10-CM

## 2019-04-29 LAB
PROLACTIN: 9.6 NG/ML
T4 FREE: 1.02 NG/DL (ref 0.93–1.76)
TSH SERPL DL<=0.05 MIU/L-ACNC: 5.55 UIU/ML (ref 0.4–4.2)

## 2019-04-29 PROCEDURE — 36415 COLL VENOUS BLD VENIPUNCTURE: CPT

## 2019-04-29 PROCEDURE — 84146 ASSAY OF PROLACTIN: CPT

## 2019-04-29 PROCEDURE — 99211 OFF/OP EST MAY X REQ PHY/QHP: CPT

## 2019-04-29 PROCEDURE — 84270 ASSAY OF SEX HORMONE GLOBUL: CPT

## 2019-04-29 PROCEDURE — 84403 ASSAY OF TOTAL TESTOSTERONE: CPT

## 2019-04-29 PROCEDURE — 77066 DX MAMMO INCL CAD BI: CPT

## 2019-04-29 PROCEDURE — 84439 ASSAY OF FREE THYROXINE: CPT

## 2019-04-29 PROCEDURE — 84443 ASSAY THYROID STIM HORMONE: CPT

## 2019-04-29 PROCEDURE — 93922 UPR/L XTREMITY ART 2 LEVELS: CPT

## 2019-04-30 LAB — TESTOSTERONE FREE: NORMAL

## 2019-07-22 ENCOUNTER — HOSPITAL ENCOUNTER (OUTPATIENT)
Dept: INTERVENTIONAL RADIOLOGY/VASCULAR | Age: 84
Discharge: HOME | End: 2019-07-22
Payer: MEDICARE

## 2019-07-22 DIAGNOSIS — I73.9 PVD (PERIPHERAL VASCULAR DISEASE) (HCC): ICD-10-CM

## 2019-07-22 PROCEDURE — 99211 OFF/OP EST MAY X REQ PHY/QHP: CPT

## 2019-07-22 PROCEDURE — 93925 LOWER EXTREMITY STUDY: CPT

## 2019-10-22 ENCOUNTER — HOSPITAL ENCOUNTER (OUTPATIENT)
Dept: INTERVENTIONAL RADIOLOGY/VASCULAR | Age: 84
Discharge: HOME | End: 2019-10-22
Payer: MEDICARE

## 2019-10-22 ENCOUNTER — HOSPITAL ENCOUNTER (OUTPATIENT)
Age: 84
Discharge: HOME | End: 2019-10-22
Payer: MEDICARE

## 2019-10-22 DIAGNOSIS — I73.9 PVD (PERIPHERAL VASCULAR DISEASE) (HCC): ICD-10-CM

## 2019-10-22 LAB
ALBUMIN SERPL-MCNC: 4.2 G/DL (ref 3.5–5.1)
ALP BLD-CCNC: 57 U/L (ref 38–126)
ALT SERPL-CCNC: 24 U/L (ref 11–66)
ANION GAP SERPL CALCULATED.3IONS-SCNC: 12 MEQ/L (ref 8–16)
AST SERPL-CCNC: 26 U/L (ref 5–40)
AVERAGE GLUCOSE: 111 MG/DL (ref 70–126)
BASOPHILS # BLD: 0.8 %
BASOPHILS ABSOLUTE: 0 THOU/MM3 (ref 0–0.1)
BILIRUB SERPL-MCNC: 0.6 MG/DL (ref 0.3–1.2)
BILIRUBIN DIRECT: < 0.2 MG/DL (ref 0–0.3)
BUN BLDV-MCNC: 21 MG/DL (ref 7–22)
CALCIUM SERPL-MCNC: 9.2 MG/DL (ref 8.5–10.5)
CHLORIDE BLD-SCNC: 104 MEQ/L (ref 98–111)
CHOLESTEROL, TOTAL: 130 MG/DL (ref 100–199)
CO2: 26 MEQ/L (ref 23–33)
CREAT SERPL-MCNC: 0.7 MG/DL (ref 0.4–1.2)
CREATININE, URINE: 157.6 MG/DL
EOSINOPHIL # BLD: 5.3 %
EOSINOPHILS ABSOLUTE: 0.3 THOU/MM3 (ref 0–0.4)
ERYTHROCYTE [DISTWIDTH] IN BLOOD BY AUTOMATED COUNT: 14 % (ref 11.5–14.5)
ERYTHROCYTE [DISTWIDTH] IN BLOOD BY AUTOMATED COUNT: 55 FL (ref 35–45)
GFR SERPL CREATININE-BSD FRML MDRD: > 90 ML/MIN/1.73M2
GLUCOSE BLD-MCNC: 96 MG/DL (ref 70–108)
HBA1C MFR BLD: 5.7 % (ref 4.4–6.4)
HCT VFR BLD CALC: 41 % (ref 42–52)
HDLC SERPL-MCNC: 59 MG/DL
HEMOGLOBIN: 13.4 GM/DL (ref 14–18)
IMMATURE GRANS (ABS): 0.01 THOU/MM3 (ref 0–0.07)
IMMATURE GRANULOCYTES: 0.2 %
LDL CHOLESTEROL CALCULATED: 55 MG/DL
LYMPHOCYTES # BLD: 27.9 %
LYMPHOCYTES ABSOLUTE: 1.7 THOU/MM3 (ref 1–4.8)
MCH RBC QN AUTO: 34.7 PG (ref 26–33)
MCHC RBC AUTO-ENTMCNC: 32.7 GM/DL (ref 32.2–35.5)
MCV RBC AUTO: 106.2 FL (ref 80–94)
MICROALBUMIN UR-MCNC: < 1.2 MG/DL
MICROALBUMIN/CREAT UR-RTO: 8 MG/G (ref 0–30)
MONOCYTES # BLD: 11.2 %
MONOCYTES ABSOLUTE: 0.7 THOU/MM3 (ref 0.4–1.3)
NUCLEATED RED BLOOD CELLS: 0 /100 WBC
PLATELET # BLD: 224 THOU/MM3 (ref 130–400)
PMV BLD AUTO: 9 FL (ref 9.4–12.4)
POTASSIUM SERPL-SCNC: 4.4 MEQ/L (ref 3.5–5.2)
RBC # BLD: 3.86 MILL/MM3 (ref 4.7–6.1)
SEG NEUTROPHILS: 54.6 %
SEGMENTED NEUTROPHILS ABSOLUTE COUNT: 3.4 THOU/MM3 (ref 1.8–7.7)
SODIUM BLD-SCNC: 142 MEQ/L (ref 135–145)
TOTAL PROTEIN: 6.7 G/DL (ref 6.1–8)
TRIGL SERPL-MCNC: 80 MG/DL (ref 0–199)
WBC # BLD: 6.2 THOU/MM3 (ref 4.8–10.8)

## 2019-10-22 PROCEDURE — 83036 HEMOGLOBIN GLYCOSYLATED A1C: CPT

## 2019-10-22 PROCEDURE — 93925 LOWER EXTREMITY STUDY: CPT

## 2019-10-22 PROCEDURE — 36415 COLL VENOUS BLD VENIPUNCTURE: CPT

## 2019-10-22 PROCEDURE — 85025 COMPLETE CBC W/AUTO DIFF WBC: CPT

## 2019-10-22 PROCEDURE — 99211 OFF/OP EST MAY X REQ PHY/QHP: CPT

## 2019-10-22 PROCEDURE — 80053 COMPREHEN METABOLIC PANEL: CPT

## 2019-10-22 PROCEDURE — 80061 LIPID PANEL: CPT

## 2019-10-22 PROCEDURE — 82248 BILIRUBIN DIRECT: CPT

## 2019-10-22 PROCEDURE — 82043 UR ALBUMIN QUANTITATIVE: CPT

## 2020-05-11 ENCOUNTER — HOSPITAL ENCOUNTER (OUTPATIENT)
Age: 85
Discharge: HOME | End: 2020-05-11
Payer: MEDICARE

## 2020-05-11 LAB
ALBUMIN SERPL-MCNC: 3.9 G/DL (ref 3.5–5.1)
ALP BLD-CCNC: 63 U/L (ref 38–126)
ALT SERPL-CCNC: 20 U/L (ref 11–66)
ANION GAP SERPL CALCULATED.3IONS-SCNC: 9 MEQ/L (ref 8–16)
AST SERPL-CCNC: 22 U/L (ref 5–40)
AVERAGE GLUCOSE: 105 MG/DL (ref 70–126)
BASOPHILS # BLD: 0.9 %
BASOPHILS ABSOLUTE: 0.1 THOU/MM3 (ref 0–0.1)
BILIRUB SERPL-MCNC: 0.4 MG/DL (ref 0.3–1.2)
BILIRUBIN DIRECT: < 0.2 MG/DL (ref 0–0.3)
BUN BLDV-MCNC: 22 MG/DL (ref 7–22)
CALCIUM SERPL-MCNC: 9.1 MG/DL (ref 8.5–10.5)
CHLORIDE BLD-SCNC: 106 MEQ/L (ref 98–111)
CHOLESTEROL, TOTAL: 110 MG/DL (ref 100–199)
CO2: 27 MEQ/L (ref 23–33)
CREAT SERPL-MCNC: 0.7 MG/DL (ref 0.4–1.2)
EOSINOPHIL # BLD: 4.7 %
EOSINOPHILS ABSOLUTE: 0.3 THOU/MM3 (ref 0–0.4)
ERYTHROCYTE [DISTWIDTH] IN BLOOD BY AUTOMATED COUNT: 13.7 % (ref 11.5–14.5)
ERYTHROCYTE [DISTWIDTH] IN BLOOD BY AUTOMATED COUNT: 54 FL (ref 35–45)
GFR SERPL CREATININE-BSD FRML MDRD: > 90 ML/MIN/1.73M2
GLUCOSE BLD-MCNC: 86 MG/DL (ref 70–108)
HBA1C MFR BLD: 5.5 % (ref 4.4–6.4)
HCT VFR BLD CALC: 40.2 % (ref 42–52)
HDLC SERPL-MCNC: 46 MG/DL
HEMOGLOBIN: 12.9 GM/DL (ref 14–18)
IMMATURE GRANS (ABS): 0.02 THOU/MM3 (ref 0–0.07)
IMMATURE GRANULOCYTES: 0.3 %
LDL CHOLESTEROL CALCULATED: 52 MG/DL
LYMPHOCYTES # BLD: 34.5 %
LYMPHOCYTES ABSOLUTE: 2 THOU/MM3 (ref 1–4.8)
MCH RBC QN AUTO: 34.2 PG (ref 26–33)
MCHC RBC AUTO-ENTMCNC: 32.1 GM/DL (ref 32.2–35.5)
MCV RBC AUTO: 106.6 FL (ref 80–94)
MONOCYTES # BLD: 10.3 %
MONOCYTES ABSOLUTE: 0.6 THOU/MM3 (ref 0.4–1.3)
NUCLEATED RED BLOOD CELLS: 0 /100 WBC
PLATELET # BLD: 235 THOU/MM3 (ref 130–400)
PMV BLD AUTO: 9.2 FL (ref 9.4–12.4)
POTASSIUM SERPL-SCNC: 4.1 MEQ/L (ref 3.5–5.2)
RBC # BLD: 3.77 MILL/MM3 (ref 4.7–6.1)
SEG NEUTROPHILS: 49.3 %
SEGMENTED NEUTROPHILS ABSOLUTE COUNT: 2.8 THOU/MM3 (ref 1.8–7.7)
SODIUM BLD-SCNC: 142 MEQ/L (ref 135–145)
T4 FREE: 1.19 NG/DL (ref 0.93–1.76)
TOTAL PROTEIN: 6.1 G/DL (ref 6.1–8)
TRIGL SERPL-MCNC: 59 MG/DL (ref 0–199)
TSH SERPL DL<=0.05 MIU/L-ACNC: 4.97 UIU/ML (ref 0.4–4.2)
WBC # BLD: 5.7 THOU/MM3 (ref 4.8–10.8)

## 2020-05-11 PROCEDURE — 84439 ASSAY OF FREE THYROXINE: CPT

## 2020-05-11 PROCEDURE — 84403 ASSAY OF TOTAL TESTOSTERONE: CPT

## 2020-05-11 PROCEDURE — 85025 COMPLETE CBC W/AUTO DIFF WBC: CPT

## 2020-05-11 PROCEDURE — 83036 HEMOGLOBIN GLYCOSYLATED A1C: CPT

## 2020-05-11 PROCEDURE — 84402 ASSAY OF FREE TESTOSTERONE: CPT

## 2020-05-11 PROCEDURE — 84270 ASSAY OF SEX HORMONE GLOBUL: CPT

## 2020-05-11 PROCEDURE — 36415 COLL VENOUS BLD VENIPUNCTURE: CPT

## 2020-05-11 PROCEDURE — 82248 BILIRUBIN DIRECT: CPT

## 2020-05-11 PROCEDURE — 80061 LIPID PANEL: CPT

## 2020-05-11 PROCEDURE — 80053 COMPREHEN METABOLIC PANEL: CPT

## 2020-05-11 PROCEDURE — 84443 ASSAY THYROID STIM HORMONE: CPT

## 2020-05-12 LAB — TESTOSTERONE FREE: NORMAL

## 2020-05-29 ENCOUNTER — HOSPITAL ENCOUNTER (OUTPATIENT)
Age: 85
Discharge: HOME | End: 2020-05-29
Payer: MEDICARE

## 2020-05-29 LAB
ABSOLUTE RETIC #: 62 THOU/MM3 (ref 20–115)
FERRITIN: 91 NG/ML (ref 22–322)
FOLATE: > 20 NG/ML (ref 4.8–24.2)
IMMATURE RETIC FRACT: 11.3 % (ref 2.3–13.4)
IRON SATURATION: 39 % (ref 20–50)
IRON: 105 UG/DL (ref 65–195)
RETIC HEMOGLOBIN: 40.4 PG (ref 28.2–35.7)
RETICULOCYTE ABSOLUTE COUNT: 1.7 % (ref 0.5–2)
TOTAL IRON BINDING CAPACITY: 266 UG/DL (ref 171–450)
VITAMIN B-12: > 2000 PG/ML (ref 211–911)

## 2020-05-29 PROCEDURE — 82607 VITAMIN B-12: CPT

## 2020-05-29 PROCEDURE — 36415 COLL VENOUS BLD VENIPUNCTURE: CPT

## 2020-05-29 PROCEDURE — 84207 ASSAY OF VITAMIN B-6: CPT

## 2020-05-29 PROCEDURE — 82746 ASSAY OF FOLIC ACID SERUM: CPT

## 2020-05-29 PROCEDURE — 84466 ASSAY OF TRANSFERRIN: CPT

## 2020-05-29 PROCEDURE — 85046 RETICYTE/HGB CONCENTRATE: CPT

## 2020-05-29 PROCEDURE — 82728 ASSAY OF FERRITIN: CPT

## 2020-05-29 PROCEDURE — 83540 ASSAY OF IRON: CPT

## 2020-05-31 LAB — TRANSFERRIN: 224 MG/DL (ref 200–400)

## 2020-06-03 LAB — VITAMIN B6: 114.2 NMOL/L (ref 20–125)

## 2021-06-07 ENCOUNTER — HOSPITAL ENCOUNTER (OUTPATIENT)
Dept: INTERVENTIONAL RADIOLOGY/VASCULAR | Age: 86
Discharge: HOME | End: 2021-06-07
Payer: MEDICARE

## 2021-06-07 DIAGNOSIS — I73.9 PERIPHERAL VASCULAR DISEASE (HCC): ICD-10-CM

## 2021-06-07 PROCEDURE — 93923 UPR/LXTR ART STDY 3+ LVLS: CPT

## 2021-06-11 RX ORDER — MIDAZOLAM HYDROCHLORIDE 1 MG/ML
1 INJECTION INTRAMUSCULAR; INTRAVENOUS ONCE
Status: CANCELLED | OUTPATIENT
Start: 2021-06-11 | End: 2021-06-11

## 2021-06-11 RX ORDER — FENTANYL CITRATE 50 UG/ML
50 INJECTION, SOLUTION INTRAMUSCULAR; INTRAVENOUS ONCE
Status: CANCELLED | OUTPATIENT
Start: 2021-06-11 | End: 2021-06-11

## 2021-06-11 RX ORDER — SODIUM CHLORIDE 450 MG/100ML
INJECTION, SOLUTION INTRAVENOUS CONTINUOUS
Status: CANCELLED | OUTPATIENT
Start: 2021-06-11

## 2021-06-14 ENCOUNTER — HOSPITAL ENCOUNTER (OUTPATIENT)
Dept: INTERVENTIONAL RADIOLOGY/VASCULAR | Age: 86
Discharge: HOME | End: 2021-06-14
Attending: RADIOLOGY | Admitting: RADIOLOGY
Payer: MEDICARE

## 2021-06-14 VITALS
WEIGHT: 218 LBS | HEIGHT: 67 IN | OXYGEN SATURATION: 97 % | RESPIRATION RATE: 16 BRPM | HEART RATE: 70 BPM | TEMPERATURE: 98 F | DIASTOLIC BLOOD PRESSURE: 89 MMHG | BODY MASS INDEX: 34.21 KG/M2 | SYSTOLIC BLOOD PRESSURE: 122 MMHG

## 2021-06-14 LAB
APTT: 32.8 SECONDS (ref 22–38)
CREAT SERPL-MCNC: 0.8 MG/DL (ref 0.4–1.2)
ERYTHROCYTE [DISTWIDTH] IN BLOOD BY AUTOMATED COUNT: 14.9 % (ref 11.5–14.5)
ERYTHROCYTE [DISTWIDTH] IN BLOOD BY AUTOMATED COUNT: 60 FL (ref 35–45)
GFR SERPL CREATININE-BSD FRML MDRD: > 90 ML/MIN/1.73M2
HCT VFR BLD CALC: 38.9 % (ref 42–52)
HEMOGLOBIN: 12.5 GM/DL (ref 14–18)
INR BLD: 1.2 (ref 0.85–1.13)
MCH RBC QN AUTO: 34.7 PG (ref 26–33)
MCHC RBC AUTO-ENTMCNC: 32.1 GM/DL (ref 32.2–35.5)
MCV RBC AUTO: 108.1 FL (ref 80–94)
PLATELET # BLD: 219 THOU/MM3 (ref 130–400)
PMV BLD AUTO: 9.2 FL (ref 9.4–12.4)
RBC # BLD: 3.6 MILL/MM3 (ref 4.7–6.1)
WBC # BLD: 5.4 THOU/MM3 (ref 4.8–10.8)

## 2021-06-14 PROCEDURE — 36246 INS CATH ABD/L-EXT ART 2ND: CPT

## 2021-06-14 PROCEDURE — 6360000004 HC RX CONTRAST MEDICATION: Performed by: RADIOLOGY

## 2021-06-14 PROCEDURE — 2709999900 IR ANGIOGRAM EXTREMITY BILATERAL

## 2021-06-14 PROCEDURE — 85610 PROTHROMBIN TIME: CPT

## 2021-06-14 PROCEDURE — 99153 MOD SED SAME PHYS/QHP EA: CPT

## 2021-06-14 PROCEDURE — 85730 THROMBOPLASTIN TIME PARTIAL: CPT

## 2021-06-14 PROCEDURE — 75625 CONTRAST EXAM ABDOMINL AORTA: CPT

## 2021-06-14 PROCEDURE — 85027 COMPLETE CBC AUTOMATED: CPT

## 2021-06-14 PROCEDURE — 2580000003 HC RX 258: Performed by: RADIOLOGY

## 2021-06-14 PROCEDURE — 6360000002 HC RX W HCPCS: Performed by: RADIOLOGY

## 2021-06-14 PROCEDURE — 75716 ARTERY X-RAYS ARMS/LEGS: CPT

## 2021-06-14 PROCEDURE — 36415 COLL VENOUS BLD VENIPUNCTURE: CPT

## 2021-06-14 PROCEDURE — 82565 ASSAY OF CREATININE: CPT

## 2021-06-14 PROCEDURE — 99152 MOD SED SAME PHYS/QHP 5/>YRS: CPT

## 2021-06-14 RX ORDER — MAGNESIUM OXIDE 400 MG/1
400 TABLET ORAL DAILY
COMMUNITY

## 2021-06-14 RX ORDER — SODIUM CHLORIDE 450 MG/100ML
INJECTION, SOLUTION INTRAVENOUS CONTINUOUS
Status: DISCONTINUED | OUTPATIENT
Start: 2021-06-14 | End: 2021-06-14 | Stop reason: HOSPADM

## 2021-06-14 RX ORDER — CLOPIDOGREL BISULFATE 75 MG/1
75 TABLET ORAL DAILY
COMMUNITY

## 2021-06-14 RX ORDER — SACUBITRIL AND VALSARTAN 24; 26 MG/1; MG/1
1 TABLET, FILM COATED ORAL 2 TIMES DAILY
COMMUNITY

## 2021-06-14 RX ORDER — FENTANYL CITRATE 50 UG/ML
50 INJECTION, SOLUTION INTRAMUSCULAR; INTRAVENOUS ONCE
Status: COMPLETED | OUTPATIENT
Start: 2021-06-14 | End: 2021-06-14

## 2021-06-14 RX ORDER — FUROSEMIDE 20 MG/1
20 TABLET ORAL DAILY
COMMUNITY

## 2021-06-14 RX ORDER — CHOLECALCIFEROL (VITAMIN D3) 1250 MCG
1 CAPSULE ORAL
COMMUNITY

## 2021-06-14 RX ORDER — CHOLECALCIFEROL (VITAMIN D3) 125 MCG
500 CAPSULE ORAL DAILY
COMMUNITY

## 2021-06-14 RX ORDER — MIDAZOLAM HYDROCHLORIDE 1 MG/ML
1 INJECTION INTRAMUSCULAR; INTRAVENOUS ONCE
Status: COMPLETED | OUTPATIENT
Start: 2021-06-14 | End: 2021-06-14

## 2021-06-14 RX ORDER — IBUPROFEN 200 MG
200 TABLET ORAL NIGHTLY
COMMUNITY

## 2021-06-14 RX ADMIN — MIDAZOLAM 1 MG: 1 INJECTION INTRAMUSCULAR; INTRAVENOUS at 10:55

## 2021-06-14 RX ADMIN — FENTANYL CITRATE 50 MCG: 50 INJECTION, SOLUTION INTRAMUSCULAR; INTRAVENOUS at 10:55

## 2021-06-14 RX ADMIN — SODIUM CHLORIDE: 4.5 INJECTION, SOLUTION INTRAVENOUS at 09:53

## 2021-06-14 RX ADMIN — IOPAMIDOL 180 ML: 612 INJECTION, SOLUTION INTRAVENOUS at 11:46

## 2021-06-14 ASSESSMENT — PAIN SCALES - GENERAL: PAINLEVEL_OUTOF10: 0

## 2021-06-14 NOTE — H&P
[facility]  Sedation/Analgesia History & Physical    Pt Name: Mary Braswell  MRN: [id number]  YOB: 1935  Provider Performing Procedure: Jacqlyn Closs, MD, MD  Primary Care Physician: Mathew Olea MD    PRE-PROCEDURE   DNR-CCA/DNR-CC []Yes [x]No  Brief History/Pre-Procedure Diagnosis: Bilateral lower extremity pain          MEDICAL HISTORY  []CAD/Valve  []Liver Disease  []Lung Disease [x]Diabetes  [x]Hypertension []Renal Disease  []Additional information:       has a past medical history of Arthritis, Hyperlipidemia, Hypertension, Macular degeneration, left eye, PONV (postoperative nausea and vomiting), and Type II or unspecified type diabetes mellitus without mention of complication, not stated as uncontrolled. SURGICAL HISTORY   has a past surgical history that includes Hydrocele surgery; hernia repair (2008); Eye removal (2009); Finger surgery; back surgery (10/2018); and Carpal tunnel release. Additional information:       ALLERGIES   Allergies as of 06/14/2021 - Fully Reviewed 06/14/2021   Allergen Reaction Noted    Sulfa antibiotics  10/27/2011     Additional information:       MEDICATIONS   Coumadin Use Last 5 Days [x]No []Yes  Antiplatelet drug therapy use last 5 days  [x]No []Yes  Other anticoagulant use last 5 days  [x]No []Yes    Current Facility-Administered Medications:     0.45 % sodium chloride infusion, , Intravenous, Continuous, Haroldo Rose MD, Last Rate: 20 mL/hr at 06/14/21 0953, New Bag at 06/14/21 0953  Prior to Admission medications    Medication Sig Start Date End Date Taking?  Authorizing Provider   furosemide (LASIX) 20 MG tablet Take 20 mg by mouth daily   Yes Historical Provider, MD   clopidogrel (PLAVIX) 75 MG tablet Take 75 mg by mouth daily   Yes Historical Provider, MD   metoprolol tartrate (LOPRESSOR) 25 MG tablet Take 25 mg by mouth 2 times daily 1/2 tablet twice day   Yes Historical Provider, MD   sacubitril-valsartan (ENTRESTO) 24-26 MG []Drainage   []Mediport Insertion  []Fistulogram []IV access       []Vertebroplasty / Augmentation  []IVC filter []Dialysis catheter []Biliary drainage  []Other: []CAPD Catheter []Nephrostomy Tube / Stent  SEDATION  Planned agent:[x]Midazolam []Meperidine [x]Sublimaze []Dilaudid []Morphine     []Diazepam  []Other:     ASA Classification:  []1 [x]2 []3 []4 []5  Class 1: A normal healthy patient  Class 2: Pt with mild to moderate systemic disease  Class 3: Severe systemic disease or disturbance  Class 4: Severe systemic disorders that are already life threatening. Class 5: Moribund pt with little chances of survival, for more than 24 hours. Mallampati I Airway Classification:   []1 [x]2 []3 []4    [x]Pre-procedure diagnostic studies complete and results available. Comment:    [x]Previous sedation/anesthesia experiences assessed. Comment:    [x]The patient is an appropriate candidate to undergo the planned procedure sedation and anesthesia. (Refer to nursing sedation/analgesia documentation record)  [x]Formulation and discussion of sedation/procedure plan, risks, and expectations with patient and/or responsible adult completed. [x]Patient examined immediately prior to the procedure.  (Refer to nursing sedation/analgesia documentation record)    Pipe Anderson MD, MD  Electronically signed 6/14/2021 at 12:29 PM

## 2021-06-14 NOTE — PLAN OF CARE
Discharge instructions given to pt and spouse, both \"voiced understanding\"    \"ready to go home\"     [phone] discharged per wc with personal items.  Has home meds, clothes, cell phone, glasses with him

## 2021-06-14 NOTE — H&P
Formulation and discussion of sedation / procedure plans, risks, benefits, side effects and alternatives with patient and/or responsible adult completed.     Electronically signed by Jacqlyn Closs, MD on 6/14/2021 at 12:29 PM

## 2021-06-14 NOTE — FLOWSHEET NOTE
Received in Cath Recovery. Report received from [facility]. Left femoral site has no signs of bleeding or swelling, area soft. Tegaderm dressing clean, dry, and intact. IV 1000 0.45 NS infusing at 20 ml per hour in right hand. Denies pain or discomfort. Monitor showing atrial fib. See Post Cath Assessment flowsheet.   Wife in to see

## 2021-06-14 NOTE — PROGRESS NOTES
Pt admitted to  [address] ambulatory for peripheral angiogram.  Pt NPO. Patient accompanied by wife, Tonya Beatty. Vital signs obtained. Assessment and data collection initiated. Oriented to room. Policies and procedures for 2E explained   All questions answered with no further questions at this time. Fall prevention and safety precautions discussed with patient. Care plan reviewed with patient and spouse. Patient and spouse verbalize understanding of the plan of care and contribute to goal setting.     1020 to procedure per bed

## 2021-06-14 NOTE — PROGRESS NOTES
1030 Patient received in IR for procedure with family taken to main radiology. 1040 This procedure has been fully reviewed with the patient and written informed consent has been obtained. [address] Dr. Astrid Alvarado in; spoke to patient and assessment obtained. [address] Patient prepped for procedure. 1100 Procedure started with Dr. Astrid Alvarado. 1101 Report given to [facility].

## 2021-06-14 NOTE — OP NOTE
Department of Radiology  Post Procedure Progress Note      Pre-Procedure Diagnosis:  Bilateral lower extremity pain    Procedure Performed:  Bilateral lower extremity arteriograms    Anesthesia: local / versed and fentanyl    Findings: No significant treatable stenosis throughout the arteries of the bilateral lower extremities. Immediate Complications:  None    Estimated Blood Loss: minimal    SEE DICTATED PROCEDURE NOTE FOR COMPLETE DETAILS.     Electronically signed by An Benson MD on 6/14/2021 at 12:31 PM

## 2022-03-10 ENCOUNTER — HOSPITAL ENCOUNTER (OUTPATIENT)
Age: 87
Discharge: HOME | End: 2022-03-10
Payer: MEDICARE

## 2022-03-10 LAB
ALBUMIN SERPL-MCNC: 4.3 G/DL (ref 3.5–5.1)
ALP BLD-CCNC: 115 U/L (ref 38–126)
ALT SERPL-CCNC: 26 U/L (ref 11–66)
ANION GAP SERPL CALCULATED.3IONS-SCNC: 11 MEQ/L (ref 8–16)
AST SERPL-CCNC: 24 U/L (ref 5–40)
AVERAGE GLUCOSE: 120 MG/DL (ref 70–126)
BASOPHILS # BLD: 0.6 %
BASOPHILS ABSOLUTE: 0 THOU/MM3 (ref 0–0.1)
BILIRUB SERPL-MCNC: 1 MG/DL (ref 0.3–1.2)
BILIRUBIN DIRECT: < 0.2 MG/DL (ref 0–0.3)
BUN BLDV-MCNC: 20 MG/DL (ref 7–22)
CALCIUM SERPL-MCNC: 9.2 MG/DL (ref 8.5–10.5)
CHLORIDE BLD-SCNC: 104 MEQ/L (ref 98–111)
CHOLESTEROL, TOTAL: 122 MG/DL (ref 100–199)
CO2: 25 MEQ/L (ref 23–33)
CREAT SERPL-MCNC: 0.8 MG/DL (ref 0.4–1.2)
CREATININE, URINE: 26.3 MG/DL
EOSINOPHIL # BLD: 4 %
EOSINOPHILS ABSOLUTE: 0.3 THOU/MM3 (ref 0–0.4)
ERYTHROCYTE [DISTWIDTH] IN BLOOD BY AUTOMATED COUNT: 14 % (ref 11.5–14.5)
ERYTHROCYTE [DISTWIDTH] IN BLOOD BY AUTOMATED COUNT: 54.7 FL (ref 35–45)
GFR SERPL CREATININE-BSD FRML MDRD: > 90 ML/MIN/1.73M2
GLUCOSE FASTING: 122 MG/DL (ref 70–108)
HBA1C MFR BLD: 6 % (ref 4.4–6.4)
HCT VFR BLD CALC: 40.3 % (ref 42–52)
HDLC SERPL-MCNC: 48 MG/DL
HEMOGLOBIN: 13.7 GM/DL (ref 14–18)
IMMATURE GRANS (ABS): 0.02 THOU/MM3 (ref 0–0.07)
IMMATURE GRANULOCYTES: 0.3 %
LDL CHOLESTEROL CALCULATED: 55 MG/DL
LYMPHOCYTES # BLD: 25.4 %
LYMPHOCYTES ABSOLUTE: 1.6 THOU/MM3 (ref 1–4.8)
MCH RBC QN AUTO: 36.2 PG (ref 26–33)
MCHC RBC AUTO-ENTMCNC: 34 GM/DL (ref 32.2–35.5)
MCV RBC AUTO: 106.6 FL (ref 80–94)
MICROALBUMIN UR-MCNC: < 1.2 MG/DL
MICROALBUMIN/CREAT UR-RTO: 46 MG/G (ref 0–30)
MONOCYTES # BLD: 9.9 %
MONOCYTES ABSOLUTE: 0.6 THOU/MM3 (ref 0.4–1.3)
NUCLEATED RED BLOOD CELLS: 0 /100 WBC
PLATELET # BLD: 251 THOU/MM3 (ref 130–400)
PMV BLD AUTO: 8.6 FL (ref 9.4–12.4)
POTASSIUM SERPL-SCNC: 4.2 MEQ/L (ref 3.5–5.2)
RBC # BLD: 3.78 MILL/MM3 (ref 4.7–6.1)
SEG NEUTROPHILS: 59.8 %
SEGMENTED NEUTROPHILS ABSOLUTE COUNT: 3.8 THOU/MM3 (ref 1.8–7.7)
SODIUM BLD-SCNC: 140 MEQ/L (ref 135–145)
TOTAL PROTEIN: 7 G/DL (ref 6.1–8)
TRIGL SERPL-MCNC: 96 MG/DL (ref 0–199)
WBC # BLD: 6.3 THOU/MM3 (ref 4.8–10.8)

## 2022-03-10 PROCEDURE — 80076 HEPATIC FUNCTION PANEL: CPT

## 2022-03-10 PROCEDURE — 85025 COMPLETE CBC W/AUTO DIFF WBC: CPT

## 2022-03-10 PROCEDURE — 80048 BASIC METABOLIC PNL TOTAL CA: CPT

## 2022-03-10 PROCEDURE — 82043 UR ALBUMIN QUANTITATIVE: CPT

## 2022-03-10 PROCEDURE — 83036 HEMOGLOBIN GLYCOSYLATED A1C: CPT

## 2022-03-10 PROCEDURE — 80061 LIPID PANEL: CPT

## 2022-03-10 PROCEDURE — 36415 COLL VENOUS BLD VENIPUNCTURE: CPT

## 2023-03-13 ENCOUNTER — HOSPITAL ENCOUNTER (OUTPATIENT)
Age: 88
Discharge: HOME | End: 2023-03-13
Payer: MEDICARE

## 2023-03-13 LAB
ALBUMIN SERPL BCG-MCNC: 4.6 G/DL (ref 3.5–5.1)
ALP SERPL-CCNC: 102 U/L (ref 38–126)
ALT SERPL W/O P-5'-P-CCNC: 20 U/L (ref 11–66)
ANION GAP SERPL CALC-SCNC: 15 MEQ/L (ref 8–16)
AST SERPL-CCNC: 21 U/L (ref 5–40)
BASOPHILS ABSOLUTE: 0 THOU/MM3 (ref 0–0.1)
BASOPHILS NFR BLD AUTO: 0.7 %
BILIRUB CONJ SERPL-MCNC: 0.3 MG/DL (ref 0–0.3)
BILIRUB SERPL-MCNC: 0.9 MG/DL (ref 0.3–1.2)
BUN SERPL-MCNC: 18 MG/DL (ref 7–22)
CALCIUM SERPL-MCNC: 9.2 MG/DL (ref 8.5–10.5)
CHLORIDE SERPL-SCNC: 104 MEQ/L (ref 98–111)
CHOLEST SERPL-MCNC: 113 MG/DL (ref 100–199)
CO2 SERPL-SCNC: 24 MEQ/L (ref 23–33)
CREAT SERPL-MCNC: 0.8 MG/DL (ref 0.4–1.2)
CREAT UR-MCNC: 8.5 MG/DL
DEPRECATED RDW RBC AUTO: 54.8 FL (ref 35–45)
EOSINOPHIL NFR BLD AUTO: 1.8 %
EOSINOPHILS ABSOLUTE: 0.1 THOU/MM3 (ref 0–0.4)
ERYTHROCYTE [DISTWIDTH] IN BLOOD BY AUTOMATED COUNT: 14.2 % (ref 11.5–14.5)
GFR SERPL CREATININE-BSD FRML MDRD: > 60 ML/MIN/1.73M2
GLUCOSE FASTING: 117 MG/DL (ref 70–108)
HCT VFR BLD AUTO: 41.8 % (ref 42–52)
HDLC SERPL-MCNC: 51 MG/DL
HGB BLD-MCNC: 14 GM/DL (ref 14–18)
IMM GRANULOCYTES # BLD AUTO: 0.03 THOU/MM3 (ref 0–0.07)
IMM GRANULOCYTES NFR BLD AUTO: 0.4 %
LDLC SERPL CALC-MCNC: 45 MG/DL
LYMPHOCYTES ABSOLUTE: 1.6 THOU/MM3 (ref 1–4.8)
LYMPHOCYTES NFR BLD AUTO: 24.6 %
MCH RBC QN AUTO: 34.9 PG (ref 26–33)
MCHC RBC AUTO-ENTMCNC: 33.5 GM/DL (ref 32.2–35.5)
MCV RBC AUTO: 104.2 FL (ref 80–94)
MICROALBUMIN UR-MCNC: < 1.2 MG/DL
MICROALBUMIN/CREAT RATIO PNL UR: 141 MG/G (ref 0–30)
MONOCYTES ABSOLUTE: 0.6 THOU/MM3 (ref 0.4–1.3)
MONOCYTES NFR BLD AUTO: 8.5 %
NEUTROPHILS NFR BLD AUTO: 64 %
NRBC BLD AUTO-RTO: 0 /100 WBC
PLATELET # BLD AUTO: 251 THOU/MM3 (ref 130–400)
PMV BLD AUTO: 9 FL (ref 9.4–12.4)
POTASSIUM SERPL-SCNC: 4.2 MEQ/L (ref 3.5–5.2)
PROT SERPL-MCNC: 7.1 G/DL (ref 6.1–8)
RBC # BLD AUTO: 4.01 MILL/MM3 (ref 4.7–6.1)
SEGMENTED NEUTROPHILS ABSOLUTE COUNT: 4.3 THOU/MM3 (ref 1.8–7.7)
SODIUM SERPL-SCNC: 143 MEQ/L (ref 135–145)
TRIGL SERPL-MCNC: 86 MG/DL (ref 0–199)
WBC # BLD AUTO: 6.7 THOU/MM3 (ref 4.8–10.8)

## 2023-03-13 PROCEDURE — 36415 COLL VENOUS BLD VENIPUNCTURE: CPT

## 2023-03-13 PROCEDURE — 80061 LIPID PANEL: CPT

## 2023-03-13 PROCEDURE — 85025 COMPLETE CBC W/AUTO DIFF WBC: CPT

## 2023-03-13 PROCEDURE — 83036 HEMOGLOBIN GLYCOSYLATED A1C: CPT

## 2023-03-13 PROCEDURE — 80076 HEPATIC FUNCTION PANEL: CPT

## 2023-03-13 PROCEDURE — 82043 UR ALBUMIN QUANTITATIVE: CPT

## 2023-03-13 PROCEDURE — 80048 BASIC METABOLIC PNL TOTAL CA: CPT

## 2023-03-14 LAB
DEPRECATED MEAN GLUCOSE BLD GHB EST-ACNC: 132 MG/DL (ref 70–126)
HBA1C MFR BLD HPLC: 6.4 % (ref 4.4–6.4)

## 2023-11-08 RX ORDER — GLIMEPIRIDE 2 MG/1
2 TABLET ORAL NIGHTLY
COMMUNITY

## 2023-11-08 RX ORDER — SACUBITRIL AND VALSARTAN 49; 51 MG/1; MG/1
1 TABLET, FILM COATED ORAL DAILY
COMMUNITY

## 2023-11-08 NOTE — PROGRESS NOTES
In preparation for their surgical procedure above patient was screened for Obstructive Sleep Apnea (PATRICIO) using the STOP-Bang Questionnaire by the Pre-Admission Testing department. This is a pre-surgical screening tool for patient safety and serves as a recommendation, this WILL NOT cause cancellation of surgery. STOP-Bang Questionnaire  * Do you currently see a pulmonologist?  No     If yes STOP, do not complete. Patient follows with Dr.     1.  Do you snore loudly (able to be heard in the next room)? No    2. Do you often feel tired or sleepy during the daytime? No       3. Has anyone ever told you that you stop breathing during your sleep? No    4. Do you have or are you being treated for high blood pressure? Yes      5. BMI more than 35? BMI (Calculated): 34.5        No    6. Age over 48 years? 80 y.o. Yes    7. Neck Circumference greater than 17 inches for male or 16 inches for female? Measured           (visits only)            Not Applicable    8. Gender Male? Yes      TOTAL SCORE: 3    PATRICIO - Low Risk : Yes to 0 - 2 questions  PATRICIO - Intermediate Risk : Yes to 3 - 4 questions  PATRICIO - High Risk : Yes to 5 - 8 questions    Adapted from:   STOP Questionnaire: A Tool to Screen Patients for Obstructive Sleep Apnea   RENETTA Benoit.P.C., Wesley Hooks M.B.B.S., Deja Fletcher M.D., [facility]. Britta Prieto, Ph.D., SIVA Tafoya.B.B.S., Rukhsana Bunn, M.Sc., Kendy Millard M.D., [facility]. RENETTA Bernstein.P.C.    Anesthesiology 2008; 187:871-46 Copyright 2008, the [place] [employer], [facility].   ----------------------------------------------------------------------------------------------------------------

## 2023-11-08 NOTE — PROGRESS NOTES
NPO after midnight  [employer] and drivers license  Wear comfortable clean clothing  Do not bring jewelry  Shower night before and morning of surgery with a liquid antibacterial soap  Bring list of medications with dosage and how often taken  Follow all instructions given by your physician   needed at discharge  Please limit to 2 visitors for surgery  You must have a responsible adult with you day of surgery and for 24 hours after surgery  Call [phone] for any questions

## 2023-11-14 ENCOUNTER — ANESTHESIA EVENT (OUTPATIENT)
Dept: OPERATING ROOM | Age: 88
End: 2023-11-14
Payer: MEDICARE

## 2023-11-14 ENCOUNTER — ANESTHESIA (OUTPATIENT)
Dept: OPERATING ROOM | Age: 88
End: 2023-11-14
Payer: MEDICARE

## 2023-11-14 ENCOUNTER — HOSPITAL ENCOUNTER (OUTPATIENT)
Age: 88
Setting detail: OUTPATIENT SURGERY
Discharge: HOME | End: 2023-11-14
Attending: SPECIALIST | Admitting: SPECIALIST
Payer: MEDICARE

## 2023-11-14 VITALS
BODY MASS INDEX: 34.97 KG/M2 | DIASTOLIC BLOOD PRESSURE: 74 MMHG | SYSTOLIC BLOOD PRESSURE: 130 MMHG | HEART RATE: 74 BPM | WEIGHT: 222.8 LBS | OXYGEN SATURATION: 94 % | TEMPERATURE: 96.3 F | HEIGHT: 67 IN | RESPIRATION RATE: 16 BRPM

## 2023-11-14 DIAGNOSIS — C44.02 SQUAMOUS CELL CARCINOMA, LIP: Primary | ICD-10-CM

## 2023-11-14 LAB — GLUCOSE BLD STRIP.AUTO-MCNC: 129 MG/DL (ref 70–108)

## 2023-11-14 PROCEDURE — 2500000003 HC RX 250 WO HCPCS: Performed by: SPECIALIST

## 2023-11-14 PROCEDURE — 6370000000 HC RX 637 (ALT 250 FOR IP): Performed by: SPECIALIST

## 2023-11-14 PROCEDURE — 2709999900 HC NON-CHARGEABLE SUPPLY: Performed by: SPECIALIST

## 2023-11-14 PROCEDURE — 3700000001 HC ADD 15 MINUTES (ANESTHESIA): Performed by: SPECIALIST

## 2023-11-14 PROCEDURE — 7100000011 HC PHASE II RECOVERY - ADDTL 15 MIN: Performed by: SPECIALIST

## 2023-11-14 PROCEDURE — 3600000002 HC SURGERY LEVEL 2 BASE: Performed by: SPECIALIST

## 2023-11-14 PROCEDURE — 7100000010 HC PHASE II RECOVERY - FIRST 15 MIN: Performed by: SPECIALIST

## 2023-11-14 PROCEDURE — 3700000000 HC ANESTHESIA ATTENDED CARE: Performed by: SPECIALIST

## 2023-11-14 PROCEDURE — 82948 REAGENT STRIP/BLOOD GLUCOSE: CPT

## 2023-11-14 PROCEDURE — 3600000012 HC SURGERY LEVEL 2 ADDTL 15MIN: Performed by: SPECIALIST

## 2023-11-14 PROCEDURE — 2500000003 HC RX 250 WO HCPCS: Performed by: ANESTHESIOLOGY

## 2023-11-14 PROCEDURE — 6360000002 HC RX W HCPCS: Performed by: SPECIALIST

## 2023-11-14 RX ORDER — HYDROCODONE BITARTRATE AND ACETAMINOPHEN 5; 325 MG/1; MG/1
1 TABLET ORAL ONCE
Status: COMPLETED | OUTPATIENT
Start: 2023-11-14 | End: 2023-11-14

## 2023-11-14 RX ORDER — HYDROCODONE BITARTRATE AND ACETAMINOPHEN 5; 325 MG/1; MG/1
1 TABLET ORAL EVERY 6 HOURS PRN
Qty: 12 TABLET | Refills: 0 | Status: SHIPPED | OUTPATIENT
Start: 2023-11-14 | End: 2023-11-17

## 2023-11-14 RX ORDER — LIDOCAINE HYDROCHLORIDE AND EPINEPHRINE 10; 10 MG/ML; UG/ML
INJECTION, SOLUTION INFILTRATION; PERINEURAL PRN
Status: DISCONTINUED | OUTPATIENT
Start: 2023-11-14 | End: 2023-11-14 | Stop reason: ALTCHOICE

## 2023-11-14 RX ORDER — PROPOFOL 10 MG/ML
INJECTION, EMULSION INTRAVENOUS CONTINUOUS PRN
Status: DISCONTINUED | OUTPATIENT
Start: 2023-11-14 | End: 2023-11-14 | Stop reason: SDUPTHER

## 2023-11-14 RX ORDER — LIDOCAINE HYDROCHLORIDE 20 MG/ML
INJECTION, SOLUTION INFILTRATION; PERINEURAL PRN
Status: DISCONTINUED | OUTPATIENT
Start: 2023-11-14 | End: 2023-11-14 | Stop reason: SDUPTHER

## 2023-11-14 RX ORDER — CHLORHEXIDINE GLUCONATE ORAL RINSE 1.2 MG/ML
15 SOLUTION DENTAL 2 TIMES DAILY
Qty: 420 ML | Refills: 0 | Status: SHIPPED | OUTPATIENT
Start: 2023-11-14 | End: 2023-11-28

## 2023-11-14 RX ADMIN — PROPOFOL 50 MCG/KG/MIN: 10 INJECTION, EMULSION INTRAVENOUS at 13:17

## 2023-11-14 RX ADMIN — LIDOCAINE HYDROCHLORIDE 80 MG: 20 INJECTION, SOLUTION INFILTRATION; PERINEURAL at 13:17

## 2023-11-14 RX ADMIN — HYDROCODONE BITARTRATE AND ACETAMINOPHEN 1 TABLET: 5; 325 TABLET ORAL at 14:51

## 2023-11-14 RX ADMIN — Medication 2000 MG: at 13:20

## 2023-11-14 ASSESSMENT — PAIN DESCRIPTION - DESCRIPTORS
DESCRIPTORS: ACHING
DESCRIPTORS: THROBBING

## 2023-11-14 ASSESSMENT — PAIN DESCRIPTION - ORIENTATION: ORIENTATION: LOWER

## 2023-11-14 ASSESSMENT — PAIN - FUNCTIONAL ASSESSMENT: PAIN_FUNCTIONAL_ASSESSMENT: 0-10

## 2023-11-14 ASSESSMENT — PAIN SCALES - GENERAL: PAINLEVEL_OUTOF10: 5

## 2023-11-14 ASSESSMENT — PAIN DESCRIPTION - LOCATION: LOCATION: MOUTH

## 2023-11-14 NOTE — OP NOTE
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 The area was then prepped and draped in the standard surgical fashion. There was a very complex left lower lip defect that encompassed the dry and wet mucosa of left lower lip, which could not be closed primarily. Therefore, a 15cm2 (3cm x 3cm + 6cm2) sum of defect/adjacent tissue transfer mucosal flap was then designed, back cut 3cm down to the gingival sulcus, elevated 3cm and inset with 4-0 Chromic suture placed in interrupted fashion over Surgicel as the flap was inset to recreate the vermilion border. The Burow's triangles were resected. The patient tolerated the procedure quite well and remained hemodynamically stable throughout the procedure and was quite comfortable throughout the operative course.     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

## 2023-11-14 NOTE — ANESTHESIA POSTPROCEDURE EVALUATION
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     Shari Phase I: Shari Score: 10    Shari Phase II:        Anesthesia Post Evaluation    Patient location during evaluation: bedside  Patient participation: complete - patient participated  Level of consciousness: awake  Airway patency: patent  Nausea & Vomiting: no nausea  Complications: no  Cardiovascular status: hemodynamically stable  Respiratory status: acceptable  Hydration status: stable  Pain management: adequate

## 2023-11-14 NOTE — H&P
[address]  History and Physical Update    Pt Name: Carrie Hodge  MRN: [id number]  YOB: 1935  Date of evaluation: 11/14/2023    I have examined the patient and reviewed the H&P/Consult and there are no changes to the patient or plans.       Minerva Morales MD  Electronically signed 11/14/2023 at 11:54 AM

## 2023-11-14 NOTE — ANESTHESIA PRE PROCEDURE
Department of Anesthesiology  Preprocedure Note       Name:  Markell King   Age:  80 y.o.  :  1935                                          MRN:  [id number]         Date:  2023      Surgeon: Sebastian Healy):  Roberto Jarrell MD    Procedure: Procedure(s):  MOHS SCC Left Lower Lip    Medications prior to admission:   Prior to Admission medications    Medication Sig Start Date End Date Taking?  Authorizing Provider   glimepiride (AMARYL) 2 MG tablet Take 1 tablet by mouth at bedtime   Yes Jay Jay Villareal MD   sacubitril-valsartan (ENTRESTO) 49-51 MG per tablet Take 1 tablet by mouth daily   Yes Provider, MD Jay Jay   Omega-3 Fatty Acids (FISH OIL PO) Take by mouth daily   Yes Jay Jay Villareal MD   Ascorbic Acid (VITAMIN C PO) Take by mouth daily   Yes Jay Jay Villareal MD   ZINC PO Take by mouth daily   Yes Provider, Historical, MD   Misc Natural Products (NF FORMULAS TESTOSTERONE PO) Take by mouth daily   Yes Jay Jay Villareal MD   Ibuprofen-diphenhydrAMINE Cit (ADVIL PM PO) Take by mouth nightly as needed   Yes ProviderJay Jay MD   furosemide (LASIX) 20 MG tablet Take 1 tablet by mouth daily    ProviderJay Jay MD   clopidogrel (PLAVIX) 75 MG tablet Take 1 tablet by mouth daily    Jay Jay Villareal MD   metoprolol tartrate (LOPRESSOR) 25 MG tablet Take 1 tablet by mouth 2 times daily 1/2 tablet twice day    Jay Jay Villareal MD   apixaban (ELIQUIS) 5 MG TABS tablet Take 1 tablet by mouth 2 times daily    Jay Jay Villareal MD   ibuprofen (ADVIL;MOTRIN) 200 MG tablet Take 1 tablet by mouth nightly 1 tablet a night    Jay Jay Villareal MD   magnesium oxide (MAG-OX) 400 MG tablet Take 1 tablet by mouth daily    ProviderJay Jay MD   Cholecalciferol (VITAMIN D3) 1.25 MG (11582 UT) CAPS Take 1 capsule by mouth daily    Jay Jay Villareal MD   vitamin B-12 (CYANOCOBALAMIN) 500 MCG tablet Take 1 tablet by mouth daily    Provider, Historical, MD   Ginkgo

## 2023-11-14 NOTE — PROGRESS NOTES
1406 To recovery via cart. Spont resp. VSS. Report received from surgical rn. IV infusing Nasra Kalata. Incision intact to lower lip covered with bacitracin. Denies pain or nausea. Drink given. Call bell in reach. [address] in room. 1440 Discharge instructions given to pt and neighbor Molly Lobo with each voicing understanding. 1442 1 Norco 5 mg given po for C/O lower lip pain. 1445 IV discontinued.  Up to dress self  1505 Discharge to home in stable ambulatory condition with with neighbor

## 2023-11-14 NOTE — DISCHARGE INSTRUCTIONS
POST OPERATIVE INSTRUCTION SHEET  SKIN TUMOR/LESION REMOVAL          Activity:    No strenuous activity for 48 hours  No activity that stresses the suture closure/incision  Regular diet; Unless operation of lip- then clear liquids for 48 hours (Sip from a cup: do not use a straw)  ABSOLUTELY NO NICOTINE OF ANY TYPE    Wound Care: The incision is open to air, you should gently wash with soap and water using fingertips then apply Bacitracin 3 times a day for 4 days to the portion of incision on the lower lip. DO NOT USE BACITRACIN LONGER THAN FOUR DAYS. Apply cold compress over next 36 hours. Do not place directly  on skin. Do not leave on greater than 20 minutes at a time. Recommend sleeping in a recliner or with head elevated for next 2-3 nights. Keep all incisions clean  You may shower 36 hours after the operation. Use fingertips only when washing around incision, no washcloth. Pat dry. Limitations:  No swimming, hot tub, sauna or soaking in a bathtub    Prescriptions: Take exactly as prescribed    Follow-Up:  Office Appointment 12/11/2023 at 9:45 pm, unless otherwise instructed by Dr. Marielos Gallo our office if you experience any of the following:   Develop a fever (temperature is greater than 100.5F)   Develop redness greater than 1 cm around incision or red streaks up         extremity   Have any excess bleeding/ increased drainage or swelling at the             incision site    *A prescription for Norco and Peridex mouthwash have been sent to your pharmacy. Additional antibiotics have been sent to your pharmacy  *You may resume aspirin, Plavix, Eliquis, Advil on Friday      How Do you Prevent Surgical Site Infections? *HAND WASHING     *STOP SHAVING   Wash your hands multiple times daily  Do not shave incisional area 48 hours before   with soap for 20 seconds. or until 2 weeks after surgery. This can   Before eating and wound care.    cause tiny cuts in the skin which can lead to

## 2024-05-24 NOTE — PROGRESS NOTES
for patient and family.      Acute Inpatient Rehabilitation Disclosure Statement provided to patient.  Patient verbalized understanding.     Patient requires an intensive and coordinate interdisciplinary team approach to the delivery of rehabilitation care including PT/OT and 24 hour nursing care and physician supervision to safely return to home setting with family.       I have reviewed and concur with the findings and results of the pre-admission screening assessment completed by the Inpatient Rehabilitation Admissions Coordinator.      MICHEL ALLISON MD

## 2024-05-25 ENCOUNTER — APPOINTMENT (OUTPATIENT)
Dept: INTERVENTIONAL RADIOLOGY/VASCULAR | Age: 89
End: 2024-05-25
Attending: PHYSICAL MEDICINE & REHABILITATION
Payer: MEDICARE

## 2024-05-25 ENCOUNTER — HOSPITAL ENCOUNTER (INPATIENT)
Age: 89
LOS: 12 days | Discharge: HOME HEALTH | End: 2024-06-06
Attending: PHYSICAL MEDICINE & REHABILITATION | Admitting: PHYSICAL MEDICINE & REHABILITATION
Payer: MEDICARE

## 2024-05-25 DIAGNOSIS — I48.20 CHRONIC ATRIAL FIBRILLATION (HCC): ICD-10-CM

## 2024-05-25 DIAGNOSIS — I95.1 ORTHOSTATIC HYPOTENSION: ICD-10-CM

## 2024-05-25 DIAGNOSIS — I10 PRIMARY HYPERTENSION: ICD-10-CM

## 2024-05-25 DIAGNOSIS — H35.30 MACULAR DEGENERATION OF LEFT EYE, UNSPECIFIED TYPE: ICD-10-CM

## 2024-05-25 DIAGNOSIS — E78.2 MIXED HYPERLIPIDEMIA: ICD-10-CM

## 2024-05-25 DIAGNOSIS — M79.2 NEUROPATHIC PAIN OF UPPER EXTREMITY: ICD-10-CM

## 2024-05-25 DIAGNOSIS — R53.81 DEBILITY: ICD-10-CM

## 2024-05-25 DIAGNOSIS — M79.661 RIGHT CALF PAIN: ICD-10-CM

## 2024-05-25 DIAGNOSIS — E11.65 CONTROLLED TYPE 2 DIABETES MELLITUS WITH HYPERGLYCEMIA, WITHOUT LONG-TERM CURRENT USE OF INSULIN (HCC): ICD-10-CM

## 2024-05-25 DIAGNOSIS — Z96.651 S/P TOTAL KNEE ARTHROPLASTY, RIGHT: Primary | ICD-10-CM

## 2024-05-25 DIAGNOSIS — G47.33 OBSTRUCTIVE SLEEP APNEA: ICD-10-CM

## 2024-05-25 DIAGNOSIS — H54.61 VISION LOSS, RIGHT EYE: ICD-10-CM

## 2024-05-25 PROBLEM — M79.89 RIGHT LEG SWELLING: Status: ACTIVE | Noted: 2024-05-25

## 2024-05-25 PROBLEM — E66.811 CLASS 1 OBESITY IN ADULT: Status: ACTIVE | Noted: 2024-05-25

## 2024-05-25 PROBLEM — E66.9 CLASS 1 OBESITY IN ADULT: Status: ACTIVE | Noted: 2024-05-25

## 2024-05-25 LAB
GLUCOSE BLD STRIP.AUTO-MCNC: 167 MG/DL (ref 70–108)
GLUCOSE BLD STRIP.AUTO-MCNC: 232 MG/DL (ref 70–108)
MRSA DNA SPEC QL NAA+PROBE: NEGATIVE

## 2024-05-25 PROCEDURE — 99222 1ST HOSP IP/OBS MODERATE 55: CPT | Performed by: PHYSICAL MEDICINE & REHABILITATION

## 2024-05-25 PROCEDURE — 82948 REAGENT STRIP/BLOOD GLUCOSE: CPT

## 2024-05-25 PROCEDURE — 93971 EXTREMITY STUDY: CPT

## 2024-05-25 PROCEDURE — 1180000000 HC REHAB R&B

## 2024-05-25 PROCEDURE — 93005 ELECTROCARDIOGRAM TRACING: CPT | Performed by: PHYSICAL MEDICINE & REHABILITATION

## 2024-05-25 PROCEDURE — 6370000000 HC RX 637 (ALT 250 FOR IP): Performed by: PHYSICAL MEDICINE & REHABILITATION

## 2024-05-25 PROCEDURE — 87641 MR-STAPH DNA AMP PROBE: CPT

## 2024-05-25 RX ORDER — ONDANSETRON 4 MG/1
4 TABLET, ORALLY DISINTEGRATING ORAL EVERY 8 HOURS PRN
Status: DISCONTINUED | OUTPATIENT
Start: 2024-05-25 | End: 2024-06-06 | Stop reason: HOSPADM

## 2024-05-25 RX ORDER — SENNOSIDES A AND B 8.6 MG/1
2 TABLET, FILM COATED ORAL NIGHTLY
Status: DISCONTINUED | OUTPATIENT
Start: 2024-05-25 | End: 2024-06-06 | Stop reason: HOSPADM

## 2024-05-25 RX ORDER — PANTOPRAZOLE SODIUM 40 MG/1
40 TABLET, DELAYED RELEASE ORAL DAILY
Status: DISCONTINUED | OUTPATIENT
Start: 2024-05-26 | End: 2024-06-06 | Stop reason: HOSPADM

## 2024-05-25 RX ORDER — CHOLECALCIFEROL (VITAMIN D3) 1250 MCG
1 CAPSULE ORAL DAILY
Status: DISCONTINUED | OUTPATIENT
Start: 2024-05-25 | End: 2024-05-25 | Stop reason: DRUGHIGH

## 2024-05-25 RX ORDER — ACETAMINOPHEN 325 MG/1
650 TABLET ORAL EVERY 4 HOURS PRN
Status: DISCONTINUED | OUTPATIENT
Start: 2024-05-25 | End: 2024-06-06 | Stop reason: HOSPADM

## 2024-05-25 RX ORDER — DOXAZOSIN MESYLATE 4 MG/1
4 TABLET ORAL NIGHTLY
Status: DISCONTINUED | OUTPATIENT
Start: 2024-05-25 | End: 2024-06-06 | Stop reason: HOSPADM

## 2024-05-25 RX ORDER — ISOSORBIDE MONONITRATE 30 MG/1
30 TABLET, EXTENDED RELEASE ORAL DAILY
Status: DISCONTINUED | OUTPATIENT
Start: 2024-05-25 | End: 2024-06-06 | Stop reason: HOSPADM

## 2024-05-25 RX ORDER — CEPHALEXIN 500 MG/1
500 CAPSULE ORAL EVERY 6 HOURS SCHEDULED
Status: COMPLETED | OUTPATIENT
Start: 2024-05-25 | End: 2024-05-28

## 2024-05-25 RX ORDER — OXYCODONE HYDROCHLORIDE 5 MG/1
5 TABLET ORAL EVERY 4 HOURS PRN
Status: DISCONTINUED | OUTPATIENT
Start: 2024-05-25 | End: 2024-06-06 | Stop reason: HOSPADM

## 2024-05-25 RX ORDER — ACETAMINOPHEN 325 MG/1
650 TABLET ORAL EVERY 6 HOURS
Status: DISCONTINUED | OUTPATIENT
Start: 2024-05-25 | End: 2024-06-06 | Stop reason: HOSPADM

## 2024-05-25 RX ORDER — TRAZODONE HYDROCHLORIDE 50 MG/1
50 TABLET ORAL NIGHTLY PRN
Status: DISCONTINUED | OUTPATIENT
Start: 2024-05-25 | End: 2024-06-04

## 2024-05-25 RX ORDER — CHLORAL HYDRATE 500 MG
1 CAPSULE ORAL DAILY
Status: DISCONTINUED | OUTPATIENT
Start: 2024-05-25 | End: 2024-05-25 | Stop reason: CLARIF

## 2024-05-25 RX ORDER — POLYETHYLENE GLYCOL 3350 17 G/17G
17 POWDER, FOR SOLUTION ORAL DAILY PRN
Status: DISCONTINUED | OUTPATIENT
Start: 2024-05-25 | End: 2024-06-06 | Stop reason: HOSPADM

## 2024-05-25 RX ORDER — CYCLOBENZAPRINE HCL 10 MG
5 TABLET ORAL 3 TIMES DAILY PRN
Status: DISCONTINUED | OUTPATIENT
Start: 2024-05-25 | End: 2024-06-06 | Stop reason: HOSPADM

## 2024-05-25 RX ORDER — LANOLIN ALCOHOL/MO/W.PET/CERES
6 CREAM (GRAM) TOPICAL NIGHTLY
Status: DISCONTINUED | OUTPATIENT
Start: 2024-05-25 | End: 2024-06-06 | Stop reason: HOSPADM

## 2024-05-25 RX ORDER — GLUCAGON 1 MG/ML
1 KIT INJECTION PRN
Status: DISCONTINUED | OUTPATIENT
Start: 2024-05-25 | End: 2024-05-27

## 2024-05-25 RX ORDER — ATORVASTATIN CALCIUM 10 MG/1
10 TABLET, FILM COATED ORAL NIGHTLY
Status: DISCONTINUED | OUTPATIENT
Start: 2024-05-25 | End: 2024-05-28

## 2024-05-25 RX ORDER — DOCUSATE SODIUM 100 MG/1
100 CAPSULE, LIQUID FILLED ORAL 2 TIMES DAILY
Status: DISCONTINUED | OUTPATIENT
Start: 2024-05-25 | End: 2024-06-03

## 2024-05-25 RX ORDER — ASPIRIN 81 MG/1
81 TABLET ORAL DAILY
Status: DISCONTINUED | OUTPATIENT
Start: 2024-05-25 | End: 2024-06-06 | Stop reason: HOSPADM

## 2024-05-25 RX ORDER — OXYCODONE HYDROCHLORIDE 5 MG/1
2.5 TABLET ORAL EVERY 4 HOURS PRN
Status: DISCONTINUED | OUTPATIENT
Start: 2024-05-25 | End: 2024-06-06 | Stop reason: HOSPADM

## 2024-05-25 RX ORDER — INSULIN LISPRO 100 [IU]/ML
0-8 INJECTION, SOLUTION INTRAVENOUS; SUBCUTANEOUS
Status: DISCONTINUED | OUTPATIENT
Start: 2024-05-25 | End: 2024-05-27

## 2024-05-25 RX ORDER — BISACODYL 10 MG
10 SUPPOSITORY, RECTAL RECTAL DAILY PRN
Status: DISCONTINUED | OUTPATIENT
Start: 2024-05-25 | End: 2024-06-06 | Stop reason: HOSPADM

## 2024-05-25 RX ORDER — MULTIVITAMIN WITH IRON
1 TABLET ORAL DAILY
Status: DISCONTINUED | OUTPATIENT
Start: 2024-05-25 | End: 2024-06-06 | Stop reason: HOSPADM

## 2024-05-25 RX ORDER — DEXTROSE MONOHYDRATE 100 MG/ML
INJECTION, SOLUTION INTRAVENOUS CONTINUOUS PRN
Status: DISCONTINUED | OUTPATIENT
Start: 2024-05-25 | End: 2024-05-27

## 2024-05-25 RX ORDER — MAGNESIUM HYDROXIDE/ALUMINUM HYDROXICE/SIMETHICONE 120; 1200; 1200 MG/30ML; MG/30ML; MG/30ML
30 SUSPENSION ORAL EVERY 6 HOURS PRN
Status: DISCONTINUED | OUTPATIENT
Start: 2024-05-25 | End: 2024-06-06 | Stop reason: HOSPADM

## 2024-05-25 RX ORDER — VITAMIN B COMPLEX
1000 TABLET ORAL DAILY
Status: DISCONTINUED | OUTPATIENT
Start: 2024-05-26 | End: 2024-06-06 | Stop reason: HOSPADM

## 2024-05-25 RX ORDER — GLIPIZIDE 5 MG/1
5 TABLET ORAL
Status: DISCONTINUED | OUTPATIENT
Start: 2024-05-26 | End: 2024-05-27

## 2024-05-25 RX ORDER — FUROSEMIDE 20 MG/1
20 TABLET ORAL DAILY
Status: DISCONTINUED | OUTPATIENT
Start: 2024-05-25 | End: 2024-06-06 | Stop reason: HOSPADM

## 2024-05-25 RX ORDER — INSULIN LISPRO 100 [IU]/ML
0-4 INJECTION, SOLUTION INTRAVENOUS; SUBCUTANEOUS NIGHTLY
Status: DISCONTINUED | OUTPATIENT
Start: 2024-05-25 | End: 2024-05-27

## 2024-05-25 RX ORDER — CLOPIDOGREL BISULFATE 75 MG/1
75 TABLET ORAL DAILY
Status: DISCONTINUED | OUTPATIENT
Start: 2024-05-25 | End: 2024-06-06 | Stop reason: HOSPADM

## 2024-05-25 RX ADMIN — METOPROLOL TARTRATE 12.5 MG: 25 TABLET, FILM COATED ORAL at 21:04

## 2024-05-25 RX ADMIN — ATORVASTATIN CALCIUM 10 MG: 10 TABLET, FILM COATED ORAL at 21:03

## 2024-05-25 RX ADMIN — CEPHALEXIN 500 MG: 500 CAPSULE ORAL at 22:49

## 2024-05-25 RX ADMIN — SENNOSIDES 17.2 MG: 8.6 TABLET, FILM COATED ORAL at 21:03

## 2024-05-25 RX ADMIN — ACETAMINOPHEN 650 MG: 325 TABLET ORAL at 21:03

## 2024-05-25 RX ADMIN — OXYCODONE 5 MG: 5 TABLET ORAL at 15:55

## 2024-05-25 RX ADMIN — APIXABAN 5 MG: 5 TABLET, FILM COATED ORAL at 21:03

## 2024-05-25 RX ADMIN — DOCUSATE SODIUM 100 MG: 100 CAPSULE, LIQUID FILLED ORAL at 21:03

## 2024-05-25 RX ADMIN — Medication 6 MG: at 21:03

## 2024-05-25 RX ADMIN — CEPHALEXIN 500 MG: 500 CAPSULE ORAL at 18:30

## 2024-05-25 RX ADMIN — DOXAZOSIN 4 MG: 4 TABLET ORAL at 21:03

## 2024-05-25 RX ADMIN — OXYCODONE 5 MG: 5 TABLET ORAL at 22:17

## 2024-05-25 ASSESSMENT — PAIN DESCRIPTION - LOCATION
LOCATION: LEG
LOCATION: LEG
LOCATION: KNEE;LEG

## 2024-05-25 ASSESSMENT — PAIN - FUNCTIONAL ASSESSMENT
PAIN_FUNCTIONAL_ASSESSMENT: PREVENTS OR INTERFERES SOME ACTIVE ACTIVITIES AND ADLS
PAIN_FUNCTIONAL_ASSESSMENT: ACTIVITIES ARE NOT PREVENTED
PAIN_FUNCTIONAL_ASSESSMENT: ACTIVITIES ARE NOT PREVENTED

## 2024-05-25 ASSESSMENT — PAIN DESCRIPTION - ORIENTATION
ORIENTATION: RIGHT

## 2024-05-25 ASSESSMENT — PAIN DESCRIPTION - FREQUENCY: FREQUENCY: CONTINUOUS

## 2024-05-25 ASSESSMENT — PAIN SCALES - GENERAL
PAINLEVEL_OUTOF10: 2
PAINLEVEL_OUTOF10: 8
PAINLEVEL_OUTOF10: 0
PAINLEVEL_OUTOF10: 7
PAINLEVEL_OUTOF10: 0
PAINLEVEL_OUTOF10: 3
PAINLEVEL_OUTOF10: 0

## 2024-05-25 ASSESSMENT — PAIN DESCRIPTION - PAIN TYPE: TYPE: SURGICAL PAIN

## 2024-05-25 ASSESSMENT — PAIN DESCRIPTION - DESCRIPTORS
DESCRIPTORS: ACHING;DISCOMFORT
DESCRIPTORS: ACHING
DESCRIPTORS: ACHING;SORE

## 2024-05-25 ASSESSMENT — PAIN DESCRIPTION - ONSET: ONSET: ON-GOING

## 2024-05-25 NOTE — PROGRESS NOTES
Physical Medicine & Rehabilitation Progress Note    Chief Complaint:  Status post right total knee replacement     Subjective:    Jake Dwyer is a 89 y.o. 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       The patient says he began having intermittent right knee buckling/collapse and occasional mild right knee pain started about 1 year ago gradually.  The symptoms progressively worsen and causing abnormal gait and some walking difficulty.  Therefore he saw his family physician more than 1 months ago for the problem.  Lumbar spine abnormality was suspected at the time.  X-ray and later CT of the lumbar spine later was done and he was discovered to have lumbar spinal stenosis.  Initially his PCP wanted to refer him to see orthospine surgeon at [facility] but he requested to see orthopedic spine surgeon who he knew in [place] ([facility]).  He later went for the scheduled visit with orthospine surgeon in [place] but was found to have right lower extremity \"bowing\" at the knee.  Therefore he saw orthopedic surgeon,  symmetrical light touch sensation at bilateral upper extremities ; slightly reduced pinprick sensation at the left third fingertip compared to right side; intact and symmetrical light touch and pinprick sensation at bilateral lower extremities  Motor : normal muscle tone at bilateral upper & lower extremities ; 3+/5 to 4-/5 muscle strength at the right shoulder abduction; 4-/5 to 4+/5 muscle strength at right shoulder flexion; 4+/5 muscle strength at left hip flexion; 3-/5 muscle strength at right hip flexion; 3+/5 to 4-/5 muscle strength at right knee extension; 3+/5 to 4-/5 muscle strength at right knee flexion; normal 5/5 muscle strength at the rest of bilateral upper & lower extremities  Reflex : 1+ bilateral biceps, bilateral brachioradialis reflexes; right knee reflex not tested; 0 left knee reflex  Pathological Reflex :  No Ilia's sign ; no ankle clonus  Gait : Not assessed       Diagnostics:   Recent Results (from the past 24 hour(s))   EKG 12 Lead    Collection Time: 05/25/24  3:53 PM   Result Value Ref Range    Ventricular Rate 85 BPM    QRS Duration 110 ms    Q-T Interval 358 ms    QTc Calculation (Bazett) 426 ms    R Axis -32 degrees    T Axis 11 degrees   MRSA by PCR    Collection Time: 05/25/24  3:55 PM    Specimen: Nares   Result Value Ref Range    MRSA SCREEN RT-PCR NEGATIVE    POCT glucose    Collection Time: 05/25/24  5:31 PM   Result Value Ref Range    POC Glucose 167 (H) 70 - 108 mg/dl   POCT glucose    Collection Time: 05/25/24  8:27 PM   Result Value Ref Range    POC Glucose 232 (H) 70 - 108 mg/dl   POCT glucose    Collection Time: 05/26/24  7:09 AM   Result Value Ref Range    POC Glucose 162 (H) 70 - 108 mg/dl   Prealbumin    Collection Time: 05/26/24  7:28 AM   Result Value Ref Range    Prealbumin 15.6 (L) 20.0 - 40.0 mg/dl   CBC auto differential    Collection Time: 05/26/24  7:28 AM   Result Value Ref Range    WBC 7.6 4.8 - 10.8 thou/mm3    RBC 2.48 (L) 4.70 - 6.10 mill/mm3

## 2024-05-25 NOTE — H&P
anterior lower thigh, right anterior knee, and right leg; no tenderness at the rest of bilateral upper & lower extremities; no palpable mass at limbs ; right hip and right knee laxity not assessed; no joints laxity or crepitation at other major limb joints; shoulder flexion and abduction passive ROM reaching 170 degrees bilaterally; right hip flexion passive ROM reaching 85 to 90 degrees limited by right knee pain; left hip flexion passive ROM reaching 120 degrees; right knee flexion passive ROM reaching 80 degrees limited by pain; right ankle dorsiflexion passive ROM reaching 5 degrees; left ankle dorsiflexion passive ROM reaching 10 degrees; normal functional joints ROM at the rest of bilateral upper & lower extremities  Cerebral :  alert ; awake ; oriented to place, person and time; follow 1-2 step verbal command; able to recall 3/3 items given immediately and 3/3 items about 2 to 3 minutes later; able to repeat series of 5 single digit numbers in right order forward but not backward; intact abstract thinking; perform serial 7 subtraction test for 8 steps correctly ([id number]  Cerebellum : no gross dysmetria with bilateral finger-to-nose test ; heel-to-shin test not performed; no dysdiadochokinesia with bilateral forearms rapid supination/pronation  Cranial Nerves : Loss of right eye vision (CN II) ; right upper eyelid drooping (CN III) : no right eye extraocular motion (CN III, IV, VI) ; grossly intact other CN IV) to XII function  Sensory : intact & symmetrical light touch sensation at bilateral upper extremities ; slightly reduced pinprick sensation at the left third fingertip compared to right side; intact and symmetrical light touch and pinprick sensation at bilateral lower extremities  Motor : normal muscle tone at bilateral upper & lower extremities ; 3+/5 muscle strength at the right shoulder abduction; 4-/5 to 4+/5 muscle strength at right shoulder flexion; 4+/5 muscle strength at  left hip flexion; 3-/5 muscle strength at right hip flexion; 3+/5 muscle strength at right knee extension; 3+/5 to 4-/5 muscle strength at right knee flexion; normal 5/5 muscle strength at the rest of bilateral upper & lower extremities  Reflex : 1+ bilateral biceps, bilateral triceps, bilateral brachioradialis reflexes; right knee reflex not tested; 0 left knee and bilateral ankle reflexes  Pathological Reflex :  No Ilia's sign ; no Babinski sign ; no ankle clonus  Gait : Not assessed      Diagnostics:  Recent Results (from the past 24 hour(s))   EKG 12 Lead    Collection Time: 05/25/24  3:53 PM   Result Value Ref Range    Ventricular Rate 85 BPM    QRS Duration 110 ms    Q-T Interval 358 ms    QTc Calculation (Bazett) 426 ms    R Axis -32 degrees    T Axis 11 degrees       Component  Ref Range & Units 5/23/2024   WBC  4.50 - 11.00 K/mcL 8.18   RBC  4.50 - 5.90 M/mcL 2.43 Low    Hemoglobin  13.5 - 17.5 g/dL 8.8 Low    Hematocrit  41.0 - 53.0 % 26.1 Low    MCV  80.0 - 100.0 fL 107.4 High    MCH  26.0 - 34.0 pg 36.2 High    MCHC  31.0 - 37.0 g/dL 33.7   Platelets  150 - 400 K/mcL 204   RDW - CV  11.6 - 14.8 % 14.1   MPV  9.4 - 12.4 fL 9.2 Low    Nucleated RBC  % 0.0   Nucleated RBC Abs  0.00 - 0.00 K/mcL 0.00       Component  Ref Range & Units 5/23/2024 Comments   Sodium  135 - 145 mmol/L 135    Potassium  3.5 - 5.1 mmol/L 3.7    Chloride  98 - 108 mmol/L 103    Bicarbonate  21 - 32 mmol/L 23    Anion Gap  10 - 20 mmol/L 13    Glucose  65 - 99 mg/dL 238 High     BUN  8 - 25 mg/dL 22    Creatinine  0.80 - 1.30 mg/dL 0.81    eGFR  >=60 mL/min/1.73 m2 84 Estimated GFR was calculated using the 2021 CKD-EPI creatinine equation.   BUN/Creatinine Ratio  10.0 - 20.0 27.2 High     Calcium  8.4 - 10.2 mg/dL 7.7 Low         Component  Ref Range & Units 5/22/2024   Troponin T  <=22 ng/L 27 High Panic        CT of lumbar spine without contrast -- [facility] (4/2/2024) :  FINDINGS:    Normal lumbar lordosis.  There

## 2024-05-25 NOTE — PROGRESS NOTES
Admitted to the Inpatient Rehabilitation Unit via bed.  Patient was then oriented to room and unit.  Education provided on the rehabilitation routine: three hours of therapy five days per week.      Explained patients right to have family, representative or physician notified of their admission.  Patient has Requested for physician to be notified.  Patient has Declined for family/representative to be notified.    Admitting medication orders compared with acute stay medications; home medication list reviewed with patient/family.  Medication issues identified No    Transportation:   Has transportation kept you from medical appointments, meetings, work, or from getting things needed for daily living? (Check all that apply)  No.      Health Literacy:   How often do you need to have someone help you when you read instructions, pamphlets, or other written material from your doctor or pharmacy?  0. - Never    Social Isolation:  How often do you feel lonely or isolated from those around you?  0. Never    Patient Mood Interview (PHQ-2 to 9) (from [employer]©)   Say to Patient: \"Over the last 2 weeks, have you been bothered by any of the following problems?\"   If symptom is present, enter yes in column 1 (Symptom Presence)  If yes in column 1, then ask the patient: “About how often have you been bothered by this?” Indicate response in column 2, Symptom Frequency.   Symptom Presence  No    Yes   9.    No response  Symptom Frequency  Never or 1 day  2-6 days (several days)  7-11 days (half or more of the days)  12-14 days (nearly every day)    Symptom Presence Symptom Frequency   Little interest or pleasure in doing things 0. No 0. Never or 1 day   Feeling down, depressed, or hopeless 0. No 0. Never or 1 day   If either A or B above has symptom frequency coded 2 or 3, CONTINUE asking questions below.      If not, END the interview  and right click on next table to delete.               Pain:  Pain Effect on Sleep    Ask

## 2024-05-26 LAB
ALBUMIN SERPL BCG-MCNC: 3.4 G/DL (ref 3.5–5.1)
ALP SERPL-CCNC: 71 U/L (ref 38–126)
ALT SERPL W/O P-5'-P-CCNC: 13 U/L (ref 11–66)
ANION GAP SERPL CALC-SCNC: 10 MEQ/L (ref 8–16)
AST SERPL-CCNC: 14 U/L (ref 5–40)
BILIRUB SERPL-MCNC: 1 MG/DL (ref 0.3–1.2)
BUN SERPL-MCNC: 23 MG/DL (ref 7–22)
CALCIUM SERPL-MCNC: 8.4 MG/DL (ref 8.5–10.5)
CHLORIDE SERPL-SCNC: 102 MEQ/L (ref 98–111)
CHOLEST SERPL-MCNC: 88 MG/DL (ref 100–199)
CO2 SERPL-SCNC: 26 MEQ/L (ref 23–33)
CREAT SERPL-MCNC: 0.7 MG/DL (ref 0.4–1.2)
DEPRECATED MEAN GLUCOSE BLD GHB EST-ACNC: 126 MG/DL (ref 70–126)
EKG Q-T INTERVAL: 358 MS
EKG QRS DURATION: 110 MS
EKG QTC CALCULATION (BAZETT): 426 MS
EKG R AXIS: -32 DEGREES
EKG T AXIS: 11 DEGREES
EKG VENTRICULAR RATE: 85 BPM
GFR SERPL CREATININE-BSD FRML MDRD: 88 ML/MIN/1.73M2
GLUCOSE BLD STRIP.AUTO-MCNC: 121 MG/DL (ref 70–108)
GLUCOSE BLD STRIP.AUTO-MCNC: 161 MG/DL (ref 70–108)
GLUCOSE BLD STRIP.AUTO-MCNC: 162 MG/DL (ref 70–108)
GLUCOSE BLD STRIP.AUTO-MCNC: 167 MG/DL (ref 70–108)
GLUCOSE SERPL-MCNC: 150 MG/DL (ref 70–108)
HBA1C MFR BLD HPLC: 6.2 % (ref 4.4–6.4)
HDLC SERPL-MCNC: 40 MG/DL
LDLC SERPL CALC-MCNC: 37 MG/DL
MAGNESIUM SERPL-MCNC: 2.1 MG/DL (ref 1.6–2.4)
POTASSIUM SERPL-SCNC: 3.9 MEQ/L (ref 3.5–5.2)
PREALB SERPL-MCNC: 15.6 MG/DL (ref 20–40)
PROT SERPL-MCNC: 5.7 G/DL (ref 6.1–8)
SCAN OF BLOOD SMEAR: NORMAL
SODIUM SERPL-SCNC: 138 MEQ/L (ref 135–145)
TRIGL SERPL-MCNC: 53 MG/DL (ref 0–199)
TROPONIN, HIGH SENSITIVITY: 43 NG/L (ref 0–12)

## 2024-05-26 PROCEDURE — 80053 COMPREHEN METABOLIC PANEL: CPT

## 2024-05-26 PROCEDURE — 83036 HEMOGLOBIN GLYCOSYLATED A1C: CPT

## 2024-05-26 PROCEDURE — 84134 ASSAY OF PREALBUMIN: CPT

## 2024-05-26 PROCEDURE — 99232 SBSQ HOSP IP/OBS MODERATE 35: CPT | Performed by: PHYSICAL MEDICINE & REHABILITATION

## 2024-05-26 PROCEDURE — 97166 OT EVAL MOD COMPLEX 45 MIN: CPT

## 2024-05-26 PROCEDURE — 97162 PT EVAL MOD COMPLEX 30 MIN: CPT

## 2024-05-26 PROCEDURE — 97535 SELF CARE MNGMENT TRAINING: CPT

## 2024-05-26 PROCEDURE — 83735 ASSAY OF MAGNESIUM: CPT

## 2024-05-26 PROCEDURE — 85025 COMPLETE CBC W/AUTO DIFF WBC: CPT

## 2024-05-26 PROCEDURE — 82948 REAGENT STRIP/BLOOD GLUCOSE: CPT

## 2024-05-26 PROCEDURE — 1180000000 HC REHAB R&B

## 2024-05-26 PROCEDURE — 80061 LIPID PANEL: CPT

## 2024-05-26 PROCEDURE — 97110 THERAPEUTIC EXERCISES: CPT

## 2024-05-26 PROCEDURE — 97530 THERAPEUTIC ACTIVITIES: CPT

## 2024-05-26 PROCEDURE — 36415 COLL VENOUS BLD VENIPUNCTURE: CPT

## 2024-05-26 PROCEDURE — 84484 ASSAY OF TROPONIN QUANT: CPT

## 2024-05-26 PROCEDURE — 6370000000 HC RX 637 (ALT 250 FOR IP): Performed by: PHYSICAL MEDICINE & REHABILITATION

## 2024-05-26 PROCEDURE — 93010 ELECTROCARDIOGRAM REPORT: CPT | Performed by: INTERNAL MEDICINE

## 2024-05-26 RX ADMIN — CEPHALEXIN 500 MG: 500 CAPSULE ORAL at 19:54

## 2024-05-26 RX ADMIN — DOCUSATE SODIUM 100 MG: 100 CAPSULE, LIQUID FILLED ORAL at 19:56

## 2024-05-26 RX ADMIN — DOCUSATE SODIUM 100 MG: 100 CAPSULE, LIQUID FILLED ORAL at 09:23

## 2024-05-26 RX ADMIN — CLOPIDOGREL BISULFATE 75 MG: 75 TABLET ORAL at 09:23

## 2024-05-26 RX ADMIN — APIXABAN 5 MG: 5 TABLET, FILM COATED ORAL at 19:55

## 2024-05-26 RX ADMIN — PANTOPRAZOLE SODIUM 40 MG: 40 TABLET, DELAYED RELEASE ORAL at 09:23

## 2024-05-26 RX ADMIN — OXYCODONE 5 MG: 5 TABLET ORAL at 17:32

## 2024-05-26 RX ADMIN — Medication 1 TABLET: at 09:23

## 2024-05-26 RX ADMIN — DOXAZOSIN 4 MG: 4 TABLET ORAL at 19:55

## 2024-05-26 RX ADMIN — Medication 6 MG: at 19:55

## 2024-05-26 RX ADMIN — SENNOSIDES 17.2 MG: 8.6 TABLET, FILM COATED ORAL at 19:56

## 2024-05-26 RX ADMIN — TRAZODONE HYDROCHLORIDE 50 MG: 50 TABLET ORAL at 19:55

## 2024-05-26 RX ADMIN — CYCLOBENZAPRINE 5 MG: 10 TABLET, FILM COATED ORAL at 09:36

## 2024-05-26 RX ADMIN — ATORVASTATIN CALCIUM 10 MG: 10 TABLET, FILM COATED ORAL at 19:56

## 2024-05-26 RX ADMIN — CEPHALEXIN 500 MG: 500 CAPSULE ORAL at 12:38

## 2024-05-26 RX ADMIN — Medication 1000 UNITS: at 09:23

## 2024-05-26 RX ADMIN — ACETAMINOPHEN 650 MG: 325 TABLET ORAL at 09:27

## 2024-05-26 RX ADMIN — CEPHALEXIN 500 MG: 500 CAPSULE ORAL at 05:59

## 2024-05-26 RX ADMIN — ACETAMINOPHEN 650 MG: 325 TABLET ORAL at 14:25

## 2024-05-26 RX ADMIN — ACETAMINOPHEN 650 MG: 325 TABLET ORAL at 19:57

## 2024-05-26 RX ADMIN — OXYCODONE 5 MG: 5 TABLET ORAL at 22:16

## 2024-05-26 RX ADMIN — OXYCODONE 5 MG: 5 TABLET ORAL at 10:24

## 2024-05-26 RX ADMIN — OXYCODONE 5 MG: 5 TABLET ORAL at 06:00

## 2024-05-26 RX ADMIN — METOPROLOL TARTRATE 12.5 MG: 25 TABLET, FILM COATED ORAL at 09:23

## 2024-05-26 RX ADMIN — CYCLOBENZAPRINE 5 MG: 10 TABLET, FILM COATED ORAL at 19:56

## 2024-05-26 RX ADMIN — METOPROLOL TARTRATE 12.5 MG: 25 TABLET, FILM COATED ORAL at 19:54

## 2024-05-26 RX ADMIN — GLIPIZIDE 5 MG: 5 TABLET ORAL at 09:23

## 2024-05-26 RX ADMIN — ASPIRIN 81 MG: 81 TABLET, COATED ORAL at 09:23

## 2024-05-26 RX ADMIN — APIXABAN 5 MG: 5 TABLET, FILM COATED ORAL at 09:23

## 2024-05-26 RX ADMIN — ISOSORBIDE MONONITRATE 30 MG: 30 TABLET, EXTENDED RELEASE ORAL at 09:23

## 2024-05-26 ASSESSMENT — PAIN DESCRIPTION - DESCRIPTORS
DESCRIPTORS: ACHING;DISCOMFORT
DESCRIPTORS: ACHING
DESCRIPTORS: THROBBING

## 2024-05-26 ASSESSMENT — PAIN - FUNCTIONAL ASSESSMENT
PAIN_FUNCTIONAL_ASSESSMENT: PREVENTS OR INTERFERES SOME ACTIVE ACTIVITIES AND ADLS
PAIN_FUNCTIONAL_ASSESSMENT: ACTIVITIES ARE NOT PREVENTED

## 2024-05-26 ASSESSMENT — PAIN DESCRIPTION - LOCATION
LOCATION: KNEE
LOCATION: KNEE
LOCATION: LEG
LOCATION: KNEE

## 2024-05-26 ASSESSMENT — PAIN DESCRIPTION - ORIENTATION
ORIENTATION: RIGHT

## 2024-05-26 ASSESSMENT — PAIN SCALES - GENERAL
PAINLEVEL_OUTOF10: 5
PAINLEVEL_OUTOF10: 7
PAINLEVEL_OUTOF10: 9
PAINLEVEL_OUTOF10: 2
PAINLEVEL_OUTOF10: 6
PAINLEVEL_OUTOF10: 5
PAINLEVEL_OUTOF10: 2
PAINLEVEL_OUTOF10: 7

## 2024-05-26 ASSESSMENT — PAIN DESCRIPTION - PAIN TYPE: TYPE: SURGICAL PAIN

## 2024-05-26 NOTE — PROGRESS NOTES
Physical Medicine & Rehabilitation Progress Note    Chief Complaint:  Status post right total knee replacement     Subjective:    Jake Dwyer is a 89 y.o. 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       The patient says he began having intermittent right knee buckling/collapse and occasional mild right knee pain started about 1 year ago gradually.  The symptoms progressively worsen and causing abnormal gait and some walking difficulty.  Therefore he saw his family physician more than 1 months ago for the problem.  Lumbar spine abnormality was suspected at the time.  X-ray and later CT of the lumbar spine later was done and he was discovered to have lumbar spinal stenosis.  Initially his PCP wanted to refer him to see orthospine surgeon at [facility] but he requested to see orthopedic spine surgeon who he knew in [place] ([facility]).  He later went for the scheduled visit with orthospine surgeon in [place] but was found to have right lower extremity \"bowing\" at the knee.  Therefore he saw orthopedic surgeon,  bilateral upper & lower extremities ; 4-/5 muscle strength at the right shoulder abduction; 4-/5 to 4+/5 muscle strength at right shoulder flexion; 4+/5 muscle strength at left hip flexion; 3-/5 muscle strength at right hip flexion; 3+/5 to 4-/5 muscle strength at right knee extension; 3+/5 to 4-/5 muscle strength at right knee flexion; normal 5/5 muscle strength at the rest of bilateral upper & lower extremities  Reflex : 1+ bilateral biceps, bilateral brachioradialis reflexes; right knee reflex not tested; 0 left knee reflex  Pathological Reflex :  No Ilia's sign ; no ankle clonus  Gait : Not assessed       Diagnostics:   Recent Results (from the past 24 hour(s))   POCT glucose    Collection Time: 05/27/24 11:44 AM   Result Value Ref Range    POC Glucose 170 (H) 70 - 108 mg/dl   POCT glucose    Collection Time: 05/27/24  5:11 PM   Result Value Ref Range    POC Glucose 177 (H) 70 - 108 mg/dl   POCT glucose    Collection Time: 05/27/24  9:21 PM   Result Value Ref Range    POC Glucose 147 (H) 70 - 108 mg/dl   Vitamin D 25 Hydroxy    Collection Time: 05/28/24  6:18 AM   Result Value Ref Range    Vit D, 25-Hydroxy 36 30 - 100 ng/ml   POCT glucose    Collection Time: 05/28/24  7:19 AM   Result Value Ref Range    POC Glucose 168 (H) 70 - 108 mg/dl       Impression:  Debility/physical decondition after right total knee arthroplasty surgery complicated by hypotension episode  Status post right total knee arthroplasty surgery for right knee severe tricompartmental osteoarthritis  Irregular heartbeat/arrhythmia due to atrial fibrillation requiring anticoagulation therapy with Eliquis  Hypertension  Orthostatic hypotension  Hyperlipidemia  Obesity  Anemia  Diabetes mellitus type 2 with hyperglycemia  Right eye blindness  Left eye macular degeneration  History of coronary artery disease requiring coronary artery stenting  History of left common iliac artery occlusion requiring angioplasty and stenting  Sleep apnea  History

## 2024-05-26 NOTE — PROGRESS NOTES
[facility]  INPATIENT OCCUPATIONAL THERAPY  [facility]  EVALUATION    Time:   Time In: 735  Time Out: 905  Timed Code Treatment Minutes: 70 Minutes  Minutes: 90          Date: 2024  Patient Name: Jake Dwyer,   Gender: male      MRN: [id number]  : 1935  (89 y.o.)  Referring Practitioner: Quintin Boudreaux MD  Diagnosis: s/p total knee arthroscopy  Additional Pertinent Hx: Jake Dwyer is a 89 y.o. 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 Elan Wilson on 2024.  He was allowed to weightbearing as tolerated on right lower extremity postoperatively.  On 2024 the patient exhibited symptomatic orthostatic hypotension during the PT therapy session.  He was treated with IV fluid and holding parameter was placed on his antihypertensive medications.    Restrictions/Precautions:  Restrictions/Precautions: Fall Risk, Up as Tolerated, Weight Bearing  Right Lower Extremity Weight Bearing: Weight Bearing As Tolerated  Position Activity Restriction  Other

## 2024-05-26 NOTE — PROGRESS NOTES
Clinical Pharmacy Note  Pharmacy Antimicrobial Monitoring    Current antibiotic(s): Keflex    Indication/Diagnosis: surgical Px -post knee surgery    Patient chart reviewed for signs/symptoms of infection: No    Signs/symptoms: n/a  If UTI, are symptoms documented? N/a    /64   Pulse (!) 102   Temp 98.8 °F (37.1 °C) (Oral)   Resp 17   Ht 1.702 m (5' 7\")   Wt 97.4 kg (214 lb 11.7 oz)   SpO2 98%   BMI 33.63 kg/m²   Lab Results   Component Value Date    WBC 7.4 04/30/2024     Current antibiotic order duration: 3 days  Are the combined total duration of orders appropriate for indication? Yes    Recommended stop date: n/a    Prescriber contacted: n/a    Recommendations accepted: n/a

## 2024-05-26 NOTE — PROGRESS NOTES
[facility]  INPATIENT PHYSICAL THERAPY  EVALUATION  [facility] - [id number]    Time In: 1100  Time Out: 1230  Timed Code Treatment Minutes: 70 Minutes  Minutes: 90          Date: 2024  Patient Name: Jake Dwyer,  Gender:  male        MRN: [id number]  : 1935  (89 y.o.)  Referral Date : 24   Referring Practitioner: Quintin Boudreaux MD  Diagnosis: S/P total knee arthroplasty, right  Additional Pertinent Hx: Per H&P:  The patient says he began having intermittent right knee buckling/collapse and occasional mild right knee pain started about 1 year ago gradually.  The symptoms progressively worsen and causing abnormal gait and some walking difficulty.  Therefore he saw his family physician more than 1 months ago for the problem.  Lumbar spine abnormality was suspected at the time.  X-ray and later CT of the lumbar spine later was done and he was discovered to have lumbar spinal stenosis.  Initially his PCP wanted to refer him to see orthospine surgeon at [facility] but he requested to see orthopedic spine surgeon who he knew in [place] (? [facility]).  He later went for the scheduled visit with orthospine surgeon in [place] but was found to have right lower extremity \"bowing\" at the knee.  Therefore he saw orthopedic surgeon, Dr. Elan Wilson instead.  X-ray of right knee was done at the time and he was diagnosed of having severe right knee tricompartment osteoarthritis.  He was advised to consider right knee replacement surgery.  Therefore the patient was admitted to [facility] on 2024.  He then underwent right total knee arthroplasty surgery by Dr. Elan Wilson on 2024 at [facility].  He was allowed to weightbearing as tolerated on right lower extremity postoperatively.  On 2024 the patient exhibited symptomatic orthostatic hypotension during the PT  current medical information, gathering information related to past medical, social and functional history, completion of standardized testing, formal and informal observation of tasks, assessment of data and development of plan of care and goals.  Treatment time included skilled education and facilitation of tasks to increase safety and independence with functional mobility for improved independence and quality of life.  Therapeutic exercise completed to improve patient's lower extremity strength to increase ROM and strength to decrease difficulty with functional mobility.  Therapeutic activity completed to improve patient's ability to complete functional mobility.    Assessment:  Body Structures, Functions, Activity Limitations Requiring Skilled Therapeutic Intervention: Decreased functional mobility , Decreased strength, Decreased balance, Decreased endurance, Increased pain  Assessment: Patient is a 89 year old s/p right TKR.  Patient was independent at [facility] with use of a rollator/RW.  At this time, patient requires moderate assistance to complete sit to stand transfers, min assist to complete bed mobility and CGA to ambulate short distances with a RW.  Patient is limited by right knee pain and decreased ROM.  Mr Dwyer will benefit from skilled PT services to improve his ability to complete functional mobility, reduce his risk for fals and allow patient to return to home safely.  Therapy Prognosis: Good  Co-morbidities: CHF, DM, arthritis, macular degeneration left eye and prosthetic eye right       Discharge Recommendations:  Discharge Recommendations: Continue to assess pending progress, Patient would benefit from continued therapy after discharge    Patient Education:  Education  Education Given To: Patient  Education Provided: Role of Therapy, Plan of Care, Mobility Training, Transfer Training  Education Method: Verbal  Barriers to Learning: Vision  Education Outcome: Verbalized understanding, Demonstrated

## 2024-05-26 NOTE — PLAN OF CARE
Problem: Pain  Goal: Verbalizes/displays adequate comfort level or baseline comfort level  Outcome: Progressing  Flowsheets (Taken 5/25/2024 2348)  Verbalizes/displays adequate comfort level or baseline comfort level:   Encourage patient to monitor pain and request assistance   Assess pain using appropriate pain scale     Problem: Safety - Adult  Goal: Free from fall injury  Outcome: Progressing  Flowsheets (Taken 5/25/2024 2348)  Free From Fall Injury: Instruct family/caregiver on patient safety     Problem: ABCDS Injury Assessment  Goal: Absence of physical injury  Outcome: Progressing  Flowsheets (Taken 5/25/2024 2348)  Absence of Physical Injury: Implement safety measures based on patient assessment

## 2024-05-26 NOTE — PLAN OF CARE
Problem: Discharge Planning  Goal: Discharge to home or other facility with appropriate resources  Outcome: Progressing  Flowsheets (Taken 5/26/2024 1752)  Discharge to home or other facility with appropriate resources: Identify barriers to discharge with patient and caregiver     Problem: Chronic Conditions and Co-morbidities  Goal: Patient's chronic conditions and co-morbidity symptoms are monitored and maintained or improved  Outcome: Progressing  Flowsheets (Taken 5/26/2024 1752)  Care Plan - Patient's Chronic Conditions and Co-Morbidity Symptoms are Monitored and Maintained or Improved: Monitor and assess patient's chronic conditions and comorbid symptoms for stability, deterioration, or improvement     Problem: Pain  Goal: Verbalizes/displays adequate comfort level or baseline comfort level  Outcome: Progressing  Flowsheets (Taken 5/26/2024 1752)  Verbalizes/displays adequate comfort level or baseline comfort level:   Encourage patient to monitor pain and request assistance   Assess pain using appropriate pain scale   Administer analgesics based on type and severity of pain and evaluate response     Problem: Safety - Adult  Goal: Free from fall injury  Outcome: Progressing  Flowsheets (Taken 5/26/2024 1752)  Free From Fall Injury: Instruct family/caregiver on patient safety     Problem: Cardiovascular - Adult  Goal: Maintains optimal cardiac output and hemodynamic stability  Outcome: Progressing  Flowsheets (Taken 5/26/2024 1752)  Maintains optimal cardiac output and hemodynamic stability: Monitor blood pressure and heart rate     Problem: Skin/Tissue Integrity - Adult  Goal: Skin integrity remains intact  Outcome: Progressing  Flowsheets (Taken 5/26/2024 1752)  Skin Integrity Remains Intact: Monitor for areas of redness and/or skin breakdown     Problem: Skin/Tissue Integrity - Adult  Goal: Incisions, wounds, or drain sites healing without S/S of infection  Flowsheets (Taken 5/26/2024 1752)  Incisions,

## 2024-05-27 LAB
AUTO DIFF PNL BLD: ABNORMAL
BASOPHILS ABSOLUTE: 0 THOU/MM3 (ref 0–0.1)
BASOPHILS NFR BLD AUTO: 0.5 %
DEPRECATED RDW RBC AUTO: 57.3 FL (ref 35–45)
EOSINOPHIL NFR BLD AUTO: 4.1 %
EOSINOPHILS ABSOLUTE: 0.3 THOU/MM3 (ref 0–0.4)
ERYTHROCYTE [DISTWIDTH] IN BLOOD BY AUTOMATED COUNT: 14.2 % (ref 11.5–14.5)
GLUCOSE BLD STRIP.AUTO-MCNC: 146 MG/DL (ref 70–108)
GLUCOSE BLD STRIP.AUTO-MCNC: 147 MG/DL (ref 70–108)
GLUCOSE BLD STRIP.AUTO-MCNC: 170 MG/DL (ref 70–108)
GLUCOSE BLD STRIP.AUTO-MCNC: 177 MG/DL (ref 70–108)
HCT VFR BLD AUTO: 27.3 % (ref 42–52)
HGB BLD-MCNC: 9 GM/DL (ref 14–18)
IMM GRANULOCYTES # BLD AUTO: 0.06 THOU/MM3 (ref 0–0.07)
IMM GRANULOCYTES NFR BLD AUTO: 0.8 %
LYMPHOCYTES ABSOLUTE: 1.5 THOU/MM3 (ref 1–4.8)
LYMPHOCYTES NFR BLD AUTO: 19.2 %
MACROCYTES BLD QL SMEAR: PRESENT
MCH RBC QN AUTO: 36.3 PG (ref 26–33)
MCHC RBC AUTO-ENTMCNC: 33 GM/DL (ref 32.2–35.5)
MCV RBC AUTO: 110.1 FL (ref 80–94)
MONOCYTES ABSOLUTE: 0.8 THOU/MM3 (ref 0.4–1.3)
MONOCYTES NFR BLD AUTO: 10.4 %
NEUTROPHILS ABSOLUTE: 4.9 THOU/MM3 (ref 1.8–7.7)
NEUTROPHILS NFR BLD AUTO: 65 %
NRBC BLD AUTO-RTO: 0 /100 WBC
PATHOLOGIST REVIEW: ABNORMAL
PLATELET # BLD AUTO: 240 THOU/MM3 (ref 130–400)
PLATELET BLD QL SMEAR: ADEQUATE
PMV BLD AUTO: 9.2 FL (ref 9.4–12.4)
RBC # BLD AUTO: 2.48 MILL/MM3 (ref 4.7–6.1)
WBC # BLD AUTO: 7.6 THOU/MM3 (ref 4.8–10.8)

## 2024-05-27 PROCEDURE — 6370000000 HC RX 637 (ALT 250 FOR IP): Performed by: PHYSICAL MEDICINE & REHABILITATION

## 2024-05-27 PROCEDURE — 1180000000 HC REHAB R&B

## 2024-05-27 PROCEDURE — 82948 REAGENT STRIP/BLOOD GLUCOSE: CPT

## 2024-05-27 RX ORDER — CALCIUM CARBONATE 500(1250)
500 TABLET ORAL 2 TIMES DAILY WITH MEALS
Status: DISCONTINUED | OUTPATIENT
Start: 2024-05-28 | End: 2024-06-06 | Stop reason: HOSPADM

## 2024-05-27 RX ADMIN — METOPROLOL TARTRATE 12.5 MG: 25 TABLET, FILM COATED ORAL at 07:54

## 2024-05-27 RX ADMIN — CEPHALEXIN 500 MG: 500 CAPSULE ORAL at 01:12

## 2024-05-27 RX ADMIN — GLIPIZIDE 5 MG: 5 TABLET ORAL at 07:53

## 2024-05-27 RX ADMIN — OXYCODONE 5 MG: 5 TABLET ORAL at 06:23

## 2024-05-27 RX ADMIN — DOCUSATE SODIUM 100 MG: 100 CAPSULE, LIQUID FILLED ORAL at 07:51

## 2024-05-27 RX ADMIN — Medication 1000 UNITS: at 07:53

## 2024-05-27 RX ADMIN — Medication 6 MG: at 21:32

## 2024-05-27 RX ADMIN — CEPHALEXIN 500 MG: 500 CAPSULE ORAL at 12:01

## 2024-05-27 RX ADMIN — CEPHALEXIN 500 MG: 500 CAPSULE ORAL at 17:38

## 2024-05-27 RX ADMIN — OXYCODONE 5 MG: 5 TABLET ORAL at 10:26

## 2024-05-27 RX ADMIN — SACUBITRIL AND VALSARTAN 1 TABLET: 49; 51 TABLET, FILM COATED ORAL at 07:54

## 2024-05-27 RX ADMIN — OXYCODONE 5 MG: 5 TABLET ORAL at 16:45

## 2024-05-27 RX ADMIN — CLOPIDOGREL BISULFATE 75 MG: 75 TABLET ORAL at 07:51

## 2024-05-27 RX ADMIN — Medication 1 TABLET: at 10:05

## 2024-05-27 RX ADMIN — PANTOPRAZOLE SODIUM 40 MG: 40 TABLET, DELAYED RELEASE ORAL at 07:51

## 2024-05-27 RX ADMIN — APIXABAN 5 MG: 5 TABLET, FILM COATED ORAL at 09:44

## 2024-05-27 RX ADMIN — DOCUSATE SODIUM 100 MG: 100 CAPSULE, LIQUID FILLED ORAL at 21:34

## 2024-05-27 RX ADMIN — CEPHALEXIN 500 MG: 500 CAPSULE ORAL at 06:15

## 2024-05-27 RX ADMIN — DOXAZOSIN 4 MG: 4 TABLET ORAL at 21:32

## 2024-05-27 RX ADMIN — ISOSORBIDE MONONITRATE 30 MG: 30 TABLET, EXTENDED RELEASE ORAL at 07:54

## 2024-05-27 RX ADMIN — SENNOSIDES 17.2 MG: 8.6 TABLET, FILM COATED ORAL at 21:34

## 2024-05-27 RX ADMIN — ASPIRIN 81 MG: 81 TABLET, COATED ORAL at 07:51

## 2024-05-27 RX ADMIN — ACETAMINOPHEN 650 MG: 325 TABLET ORAL at 16:45

## 2024-05-27 RX ADMIN — METOPROLOL TARTRATE 12.5 MG: 25 TABLET, FILM COATED ORAL at 21:33

## 2024-05-27 RX ADMIN — ACETAMINOPHEN 650 MG: 325 TABLET ORAL at 07:51

## 2024-05-27 RX ADMIN — ACETAMINOPHEN 650 MG: 325 TABLET ORAL at 21:32

## 2024-05-27 RX ADMIN — APIXABAN 5 MG: 5 TABLET, FILM COATED ORAL at 21:34

## 2024-05-27 RX ADMIN — ATORVASTATIN CALCIUM 10 MG: 10 TABLET, FILM COATED ORAL at 21:34

## 2024-05-27 ASSESSMENT — PAIN DESCRIPTION - FREQUENCY: FREQUENCY: CONTINUOUS

## 2024-05-27 ASSESSMENT — PAIN DESCRIPTION - LOCATION
LOCATION: LEG;KNEE
LOCATION: KNEE;LEG
LOCATION: LEG
LOCATION: KNEE

## 2024-05-27 ASSESSMENT — PAIN SCALES - GENERAL
PAINLEVEL_OUTOF10: 7
PAINLEVEL_OUTOF10: 8
PAINLEVEL_OUTOF10: 6
PAINLEVEL_OUTOF10: 5
PAINLEVEL_OUTOF10: 1

## 2024-05-27 ASSESSMENT — PAIN DESCRIPTION - ORIENTATION
ORIENTATION: RIGHT

## 2024-05-27 ASSESSMENT — PAIN DESCRIPTION - DESCRIPTORS
DESCRIPTORS: STABBING
DESCRIPTORS: TEARING
DESCRIPTORS: ACHING;SHARP

## 2024-05-27 ASSESSMENT — PAIN - FUNCTIONAL ASSESSMENT
PAIN_FUNCTIONAL_ASSESSMENT: ACTIVITIES ARE NOT PREVENTED
PAIN_FUNCTIONAL_ASSESSMENT: ACTIVITIES ARE NOT PREVENTED

## 2024-05-27 ASSESSMENT — PAIN DESCRIPTION - PAIN TYPE: TYPE: SURGICAL PAIN

## 2024-05-27 ASSESSMENT — PAIN DESCRIPTION - ONSET: ONSET: ON-GOING

## 2024-05-27 NOTE — DISCHARGE INSTRUCTIONS
NO DRIVING.      Check blood pressure before taking medications for hypertension (Imdur, Cardura, Entresto, Lasix, Lopressor)     Total Knee Replacement: What to Expect at Home  Your Recovery     You had a total knee replacement. The doctor replaced the worn ends of the bones that connect to your knee (thighbone and lower leg bone) with plastic and metal parts.  When you leave the hospital, you should be able to move around with a walker or crutches. But you will need someone to help you at home until you have more energy and can move around better.  You will go home with a bandage and stitches, staples, skin glue, or tape strips. Change the bandage as your doctor tells you to. If you have stitches or staples, your doctor will remove them about 2 weeks after your surgery. Glue or tape strips will fall off on their own over time. You may still have some mild pain, and the area may be swollen for a few months after surgery.  Your knee will continue to improve for up to a year. You will probably use a walker for some time after surgery. When you are ready, you can use a cane. You may be able to walk without support after a couple weeks, or when you are comfortable.  You will need to do months of physical rehabilitation (rehab) after a knee replacement. Rehab will help you strengthen the muscles of the knee and help you regain movement. After you recover, your artificial knee will allow you to do normal daily activities with less pain or no pain at all. You may be able to hike, dance, or ride a bike. Talk to your doctor about whether you can do more strenuous activities. Always tell your caregivers that you have an artificial knee.  How long it will take to walk on your own, return to normal activities, and go back to work depends on your health and how well your rehabilitation (rehab) program goes. The better you do with your rehab exercises, the quicker you will get your strength and movement back.  This care sheet  you think your pain medicine is making you sick to your stomach:  Take your medicine after meals (unless your doctor has told you not to).  Ask your doctor for a different pain medicine.     If your doctor prescribed antibiotics, take them as directed. Do not stop taking them just because you feel better. You need to take the full course of antibiotics.   Incision care    If your doctor told you how to care for your cut (incision), follow your doctor's instructions. You will have a dressing over the cut. A dressing helps the incision heal and protects it. Your doctor will tell you how to take care of this.     If you did not get instructions, follow this general advice:  If you have strips of tape on the cut the doctor made, leave the tape on for a week or until it falls off.  If you have stitches or staples, your doctor will tell you when to come back to have them removed.  If you have skin glue on the cut, leave it on until it falls off. Skin glue is also called skin adhesive or liquid stitches.  Change the bandage every day.  Wash the area daily with warm water, and pat it dry. Don't use hydrogen peroxide or alcohol. They can slow healing.  You may cover the area with a gauze bandage if it oozes fluid or rubs against clothing.  You may shower 24 to 48 hours after surgery. Pat the incision dry. Don't swim or take a bath for the first 2 weeks, or until your doctor tells you it is okay.   Exercise    Your rehab program will give you a number of exercises to do to help you get back your knee's range of motion and strength. Always do them as your therapist tells you.   Ice    For pain and swelling, put ice or a cold pack on the area for 10 to 20 minutes at a time. Put a thin cloth between the ice and your skin. If your doctor recommended cold therapy using a portable machine, follow the instructions that came with the machine.   Other instructions    Wear compression stockings if your doctor told you to. These may

## 2024-05-27 NOTE — PLAN OF CARE
Individualized Plan of Care  [facility] Inpatient Rehabilitation Unit    Rehabilitation physician: Dr. Boudreaux    Admit Date: 5/25/2024     Rehabilitation Diagnosis: History of total knee arthroplasty [Z96.659]  S/P total knee arthroplasty, right [Z96.651]      Rehabilitation impairments: self care, mobility, bowel/bladder management, pain management, and safety    Factors facilitating achievement of predicted outcomes: Family support, Motivated, Cooperative, and Pleasant  Barriers to the achievement of predicted outcomes: Pain, Limited family support, Limited safety awareness, Limited insight into deficits, Unrealistic expectations, Decreased endurance, Lower extremity weakness, Stairs at home, Skin Care, and Wound Care    Patient Goals: Improve independence with mobility, Improvement of mobility at a wheelchair level, Increase overall strength and endurance, Increase balance, Increase endurance, Increase independence with activities of daily living, Improve cognition, Increase self-awareness, Increase safety awareness, Increase community integration, Increase socialization, Functional communication with caregivers, Integrate appropriate pain management plan, Assure adequate nutritional option for discharge, Continence of bowel and bladder, and Provide appropriate patient and family education      NURSING:  Nursing goals for Jake Dwyer while on the rehabilitation unit will include:  Continence of bowel and bladder, Adequate number of bowel movements, Urinate with no urinary retention >300ml in bladder, Maintain O2 SATs at an acceptable level during stay, Effective pain management while on the rehabilitation unit, Establish adequate pain control plan for discharge, Absence of skin breakdown while on the rehabilitation unit, Improved skin integrity via assessments including wound measurements, Avoidance of any hospital acquired infections, No signs/symptoms of infection at the wound site, [place]

## 2024-05-27 NOTE — PLAN OF CARE
Problem: Discharge Planning  Goal: Discharge to home or other facility with appropriate resources  Outcome: Progressing  Flowsheets (Taken 5/27/2024 1124)  Discharge to home or other facility with appropriate resources: Identify barriers to discharge with patient and caregiver     Problem: Chronic Conditions and Co-morbidities  Goal: Patient's chronic conditions and co-morbidity symptoms are monitored and maintained or improved  5/27/2024 1124 by Erma Collins LPN  Outcome: Progressing  Flowsheets (Taken 5/27/2024 1124)  Care Plan - Patient's Chronic Conditions and Co-Morbidity Symptoms are Monitored and Maintained or Improved: Monitor and assess patient's chronic conditions and comorbid symptoms for stability, deterioration, or improvement  5/27/2024 0323 by Ricardo Cason LPN  Outcome: Progressing  Flowsheets (Taken 5/27/2024 0323)  Care Plan - Patient's Chronic Conditions and Co-Morbidity Symptoms are Monitored and Maintained or Improved:   Monitor and assess patient's chronic conditions and comorbid symptoms for stability, deterioration, or improvement   Collaborate with multidisciplinary team to address chronic and comorbid conditions and prevent exacerbation or deterioration     Problem: Pain  Goal: Verbalizes/displays adequate comfort level or baseline comfort level  5/27/2024 1124 by Erma Collins LPN  Outcome: Progressing  Flowsheets (Taken 5/27/2024 1124)  Verbalizes/displays adequate comfort level or baseline comfort level:   Encourage patient to monitor pain and request assistance   Administer analgesics based on type and severity of pain and evaluate response  5/27/2024 0323 by Ricardo Cason LPN  Outcome: Progressing     Problem: Safety - Adult  Goal: Free from fall injury  5/27/2024 1124 by Erma Collins LPN  Outcome: Progressing  Flowsheets (Taken 5/27/2024 1124)  Free From Fall Injury: Instruct family/caregiver on patient safety  5/27/2024 0323 by Ricardo Cason  LPN  Outcome: Progressing  Flowsheets (Taken 5/27/2024 0323)  Free From Fall Injury: Instruct family/caregiver on patient safety     Problem: ABCDS Injury Assessment  Goal: Absence of physical injury  Outcome: Progressing  Flowsheets (Taken 5/27/2024 1124)  Absence of Physical Injury: Implement safety measures based on patient assessment     Problem: Respiratory - Adult  Goal: Achieves optimal ventilation and oxygenation  Outcome: Progressing  Flowsheets (Taken 5/27/2024 1124)  Achieves optimal ventilation and oxygenation: Assess for changes in respiratory status     Problem: Cardiovascular - Adult  Goal: Maintains optimal cardiac output and hemodynamic stability  Outcome: Progressing  Flowsheets (Taken 5/27/2024 1124)  Maintains optimal cardiac output and hemodynamic stability: Monitor blood pressure and heart rate     Problem: Skin/Tissue Integrity - Adult  Goal: Skin integrity remains intact  5/27/2024 1124 by Erma Collins LPN  Outcome: Progressing  Flowsheets (Taken 5/27/2024 0323 by Ricardo Cason LPN)  Skin Integrity Remains Intact: Monitor for areas of redness and/or skin breakdown  5/27/2024 0323 by Ricardo Cason LPN  Outcome: Progressing  Flowsheets  Taken 5/27/2024 0323  Skin Integrity Remains Intact: Monitor for areas of redness and/or skin breakdown  Taken 5/27/2024 0321  Skin Integrity Remains Intact: Monitor for areas of redness and/or skin breakdown  Goal: Incisions, wounds, or drain sites healing without S/S of infection  Outcome: Progressing  Flowsheets  Taken 5/27/2024 1124 by Erma Collins LPN  Incisions, Wounds, or Drain Sites Healing Without Sign and Symptoms of Infection: TWICE DAILY: Assess and document skin integrity  Taken 5/27/2024 0321 by Ricardo Cason LPN  Incisions, Wounds, or Drain Sites Healing Without Sign and Symptoms of Infection: TWICE DAILY: Assess and document skin integrity     Problem: Musculoskeletal - Adult  Goal: Return mobility to safest level of

## 2024-05-27 NOTE — PLAN OF CARE
Problem: Chronic Conditions and Co-morbidities  Goal: Patient's chronic conditions and co-morbidity symptoms are monitored and maintained or improved  5/27/2024 0323 by Ricardo Cason LPN  Outcome: Progressing  Flowsheets (Taken 5/27/2024 0323)  Care Plan - Patient's Chronic Conditions and Co-Morbidity Symptoms are Monitored and Maintained or Improved:   Monitor and assess patient's chronic conditions and comorbid symptoms for stability, deterioration, or improvement   Collaborate with multidisciplinary team to address chronic and comorbid conditions and prevent exacerbation or deterioration     Problem: Pain  Goal: Verbalizes/displays adequate comfort level or baseline comfort level  5/27/2024 0323 by Ricardo Cason LPN  Outcome: Progressing     Problem: Safety - Adult  Goal: Free from fall injury  5/27/2024 0323 by Ricardo Cason LPN  Outcome: Progressing  Flowsheets (Taken 5/27/2024 0323)  Free From Fall Injury: Instruct family/caregiver on patient safety     Problem: Skin/Tissue Integrity - Adult  Goal: Skin integrity remains intact  5/27/2024 0323 by Ricardo Cason LPN  Outcome: Progressing  Flowsheets  Taken 5/27/2024 0323  Skin Integrity Remains Intact: Monitor for areas of redness and/or skin breakdown  Taken 5/27/2024 0321  Skin Integrity Remains Intact: Monitor for areas of redness and/or skin breakdown     Problem: Musculoskeletal - Adult  Goal: Return mobility to safest level of function  5/27/2024 0323 by Ricardo Cason LPN  Outcome: Progressing  Flowsheets (Taken 5/27/2024 0323)  Return Mobility to Safest Level of Function:   Assess patient stability and activity tolerance for standing, transferring and ambulating with or without assistive devices   Assist with transfers and ambulation using safe patient handling equipment as needed

## 2024-05-28 LAB
25(OH)D3 SERPL-MCNC: 36 NG/ML (ref 30–100)
GLUCOSE BLD STRIP.AUTO-MCNC: 168 MG/DL (ref 70–108)

## 2024-05-28 PROCEDURE — 6370000000 HC RX 637 (ALT 250 FOR IP): Performed by: PHYSICAL MEDICINE & REHABILITATION

## 2024-05-28 PROCEDURE — 97116 GAIT TRAINING THERAPY: CPT

## 2024-05-28 PROCEDURE — 6370000000 HC RX 637 (ALT 250 FOR IP): Performed by: FAMILY MEDICINE

## 2024-05-28 PROCEDURE — 1180000000 HC REHAB R&B

## 2024-05-28 PROCEDURE — 82948 REAGENT STRIP/BLOOD GLUCOSE: CPT

## 2024-05-28 PROCEDURE — 97535 SELF CARE MNGMENT TRAINING: CPT

## 2024-05-28 PROCEDURE — 97110 THERAPEUTIC EXERCISES: CPT

## 2024-05-28 PROCEDURE — 82306 VITAMIN D 25 HYDROXY: CPT

## 2024-05-28 PROCEDURE — 36415 COLL VENOUS BLD VENIPUNCTURE: CPT

## 2024-05-28 PROCEDURE — 99232 SBSQ HOSP IP/OBS MODERATE 35: CPT | Performed by: PHYSICAL MEDICINE & REHABILITATION

## 2024-05-28 PROCEDURE — 97530 THERAPEUTIC ACTIVITIES: CPT

## 2024-05-28 RX ORDER — ENEMA 19; 7 G/133ML; G/133ML
1 ENEMA RECTAL
Status: ACTIVE | OUTPATIENT
Start: 2024-05-28 | End: 2024-05-29

## 2024-05-28 RX ORDER — POLYETHYLENE GLYCOL 3350 17 G/17G
17 POWDER, FOR SOLUTION ORAL DAILY
Status: DISCONTINUED | OUTPATIENT
Start: 2024-05-28 | End: 2024-06-06 | Stop reason: HOSPADM

## 2024-05-28 RX ADMIN — SENNOSIDES 17.2 MG: 8.6 TABLET, FILM COATED ORAL at 19:51

## 2024-05-28 RX ADMIN — OXYCODONE 5 MG: 5 TABLET ORAL at 01:09

## 2024-05-28 RX ADMIN — METOPROLOL TARTRATE 12.5 MG: 25 TABLET, FILM COATED ORAL at 19:51

## 2024-05-28 RX ADMIN — CEPHALEXIN 500 MG: 500 CAPSULE ORAL at 06:19

## 2024-05-28 RX ADMIN — MAGNESIUM HYDROXIDE 15 ML: 1200 LIQUID ORAL at 13:07

## 2024-05-28 RX ADMIN — PANTOPRAZOLE SODIUM 40 MG: 40 TABLET, DELAYED RELEASE ORAL at 08:14

## 2024-05-28 RX ADMIN — DOCUSATE SODIUM 100 MG: 100 CAPSULE, LIQUID FILLED ORAL at 19:53

## 2024-05-28 RX ADMIN — ASPIRIN 81 MG: 81 TABLET, COATED ORAL at 08:14

## 2024-05-28 RX ADMIN — APIXABAN 5 MG: 5 TABLET, FILM COATED ORAL at 19:51

## 2024-05-28 RX ADMIN — Medication 6 MG: at 19:51

## 2024-05-28 RX ADMIN — DOXAZOSIN 4 MG: 4 TABLET ORAL at 19:51

## 2024-05-28 RX ADMIN — ACETAMINOPHEN 650 MG: 325 TABLET ORAL at 20:59

## 2024-05-28 RX ADMIN — TRAZODONE HYDROCHLORIDE 50 MG: 50 TABLET ORAL at 19:51

## 2024-05-28 RX ADMIN — OXYCODONE 5 MG: 5 TABLET ORAL at 21:01

## 2024-05-28 RX ADMIN — FUROSEMIDE 20 MG: 20 TABLET ORAL at 08:16

## 2024-05-28 RX ADMIN — CALCIUM 500 MG: 500 TABLET ORAL at 08:14

## 2024-05-28 RX ADMIN — Medication 1 TABLET: at 08:14

## 2024-05-28 RX ADMIN — ACETAMINOPHEN 650 MG: 325 TABLET ORAL at 02:38

## 2024-05-28 RX ADMIN — APIXABAN 5 MG: 5 TABLET, FILM COATED ORAL at 08:14

## 2024-05-28 RX ADMIN — ACETAMINOPHEN 650 MG: 325 TABLET ORAL at 08:14

## 2024-05-28 RX ADMIN — POLYETHYLENE GLYCOL 3350 17 G: 17 POWDER, FOR SOLUTION ORAL at 13:36

## 2024-05-28 RX ADMIN — CLOPIDOGREL BISULFATE 75 MG: 75 TABLET ORAL at 08:14

## 2024-05-28 RX ADMIN — CALCIUM 500 MG: 500 TABLET ORAL at 16:30

## 2024-05-28 RX ADMIN — ISOSORBIDE MONONITRATE 30 MG: 30 TABLET, EXTENDED RELEASE ORAL at 08:16

## 2024-05-28 RX ADMIN — CEPHALEXIN 500 MG: 500 CAPSULE ORAL at 00:14

## 2024-05-28 RX ADMIN — DOCUSATE SODIUM 283 MG: 283 LIQUID RECTAL at 17:32

## 2024-05-28 RX ADMIN — ACETAMINOPHEN 650 MG: 325 TABLET ORAL at 13:36

## 2024-05-28 RX ADMIN — OXYCODONE 5 MG: 5 TABLET ORAL at 13:05

## 2024-05-28 RX ADMIN — CEPHALEXIN 500 MG: 500 CAPSULE ORAL at 12:09

## 2024-05-28 RX ADMIN — DOCUSATE SODIUM 100 MG: 100 CAPSULE, LIQUID FILLED ORAL at 08:14

## 2024-05-28 RX ADMIN — SACUBITRIL AND VALSARTAN 1 TABLET: 49; 51 TABLET, FILM COATED ORAL at 08:16

## 2024-05-28 RX ADMIN — Medication 1000 UNITS: at 08:14

## 2024-05-28 RX ADMIN — METOPROLOL TARTRATE 12.5 MG: 25 TABLET, FILM COATED ORAL at 08:16

## 2024-05-28 ASSESSMENT — PAIN SCALES - GENERAL
PAINLEVEL_OUTOF10: 2
PAINLEVEL_OUTOF10: 10
PAINLEVEL_OUTOF10: 6
PAINLEVEL_OUTOF10: 6
PAINLEVEL_OUTOF10: 2
PAINLEVEL_OUTOF10: 8
PAINLEVEL_OUTOF10: 2
PAINLEVEL_OUTOF10: 2
PAINLEVEL_OUTOF10: 7

## 2024-05-28 ASSESSMENT — PAIN DESCRIPTION - ORIENTATION
ORIENTATION: RIGHT

## 2024-05-28 ASSESSMENT — PAIN DESCRIPTION - DESCRIPTORS
DESCRIPTORS: STABBING;TEARING
DESCRIPTORS: STABBING;TEARING
DESCRIPTORS: TEARING
DESCRIPTORS: ACHING

## 2024-05-28 ASSESSMENT — PAIN DESCRIPTION - LOCATION
LOCATION: KNEE;LEG
LOCATION: KNEE
LOCATION: LEG;KNEE
LOCATION: KNEE

## 2024-05-28 NOTE — PROGRESS NOTES
[place]  INPATIENT PHYSICAL THERAPY  DAILY NOTE  [facility] - 8K-07/[id number]    Time In: 0832  Time Out: 09  Timed Code Treatment Minutes: 30 Minutes  Minutes: 30          Date: 2024  Patient Name: Jake Dwyer,  Gender:  male        MRN: [id number]  : 1935  (89 y.o.)  Referral Date : 24  Referring Practitioner: Quintin Boudreaux MD  Diagnosis: S/P total knee arthroplasty, right  Additional Pertinent Hx: Per H&P:  The patient says he began having intermittent right knee buckling/collapse and occasional mild right knee pain started about 1 year ago gradually.  The symptoms progressively worsen and causing abnormal gait and some walking difficulty.  Therefore he saw his family physician more than 1 months ago for the problem.  Lumbar spine abnormality was suspected at the time.  X-ray and later CT of the lumbar spine later was done and he was discovered to have lumbar spinal stenosis.  Initially his PCP wanted to refer him to see orthospine surgeon at [facility] ([facility]) but he requested to see orthopedic spine surgeon who he knew in [place] (? [facility]).  He later went for the scheduled visit with orthospine surgeon in [place] but was found to have right lower extremity \"bowing\" at the knee.  Therefore he saw orthopedic surgeon, Dr. Elan Wilson instead.  X-ray of right knee was done at the time and he was diagnosed of having severe right knee tricompartment osteoarthritis.  He was advised to consider right knee replacement surgery.  Therefore the patient was admitted to [facility] on 2024.  He then underwent right total knee arthroplasty surgery by Dr. Elan Wilson on 2024 at [facility].  He was allowed to weightbearing as tolerated on right lower extremity postoperatively.  On 2024 the patient exhibited symptomatic orthostatic hypotension during the PT

## 2024-05-28 NOTE — CONSULTS
Comprehensive Nutrition Assessment    Type and Reason for Visit: Initial, Consult (Nutrition Needs)    Nutrition Recommendations/Plan:   Continue diet as ordered.   Initiate Glucerna once daily.   Continue MVI at discharge to promote wound healing.      Malnutrition Assessment:  Malnutrition Status: At risk for malnutrition (Comment) (decreased appetite r/t advanced age and lack of mobility)  Context: Acute Illness       Findings of the 6 clinical characteristics of malnutrition:  Energy Intake:  No significant decrease in energy intake (usually eats 2 meals per day, not very active so he does not work up a large appetite)  Weight Loss:  No significant weight loss     Body Fat Loss:  No significant body fat loss (some age related fat losses to orbital region)     Muscle Mass Loss:  No significant muscle mass loss    Fluid Accumulation:  No significant fluid accumulation     Strength:  Not Performed    Nutrition Assessment:    Pt. nutritionally compromised AEB wounds. At risk for further nutrition compromise r/t increased nutrient needs for wound healing, admitted from [facility] for rehab s/p right TKR. Noted underlying medical condition (PMHx arterial occlusion, arthritis, Afib, CAD, CHF, T2DM, HLD, HTN, hx skin cancer on lip, ).     Nutrition Related Findings:    Patient/ Family Comments: Per patient his appetite is just ok related to he is not very active. Patient reports that he follows a diabetic diet at home and would like to continue to be on a diabetic diet (RD ensured patient that he is on a diabetic diet). Patient reports that he usually eats 2 meals per day at home, he is not hungry enough to eat more. Patient would like to trial Glucerna once daily to aid in wound healing. Patient reports he is constipated and has been eating a lot of prunes and drinking prune juice.   Edema: generalized non-pitting edema,per flowsheet  GI Function: LBM 05/26/24 per flowsheet  Wound: Surgical Incision (Right    Coordination of Nutrition Care: Continue to monitor while inpatient     Goals:  Goals: Meet at least 75% of estimated needs, by next RD assessment     Nutrition Monitoring and Evaluation:   Food/ Nutrient Intake Outcomes: Food and Nutrient Intake, Supplement Intake   Physical Sings/ Symptoms Outcomes: Biochemical Data, GI Status, Fluid Status or Edema, Hemodynamic Status, Nutrition Focused Physical Findings, Skin, Weight     Discharge Planning:    Continue Oral Nutrition Supplement      Nikki Solano MS, RD, LD  Contact: [phone]

## 2024-05-28 NOTE — PLAN OF CARE
Problem: Discharge Planning  Goal: Discharge to home or other facility with appropriate resources  Outcome: Progressing  Flowsheets (Taken 5/27/2024 2125)  Discharge to home or other facility with appropriate resources: Identify barriers to discharge with patient and caregiver     Problem: Chronic Conditions and Co-morbidities  Goal: Patient's chronic conditions and co-morbidity symptoms are monitored and maintained or improved  Outcome: Progressing  Flowsheets (Taken 5/27/2024 2125)  Care Plan - Patient's Chronic Conditions and Co-Morbidity Symptoms are Monitored and Maintained or Improved: Monitor and assess patient's chronic conditions and comorbid symptoms for stability, deterioration, or improvement     Problem: Pain  Goal: Verbalizes/displays adequate comfort level or baseline comfort level  Outcome: Progressing  Flowsheets (Taken 5/27/2024 2125)  Verbalizes/displays adequate comfort level or baseline comfort level: Encourage patient to monitor pain and request assistance     Problem: Safety - Adult  Goal: Free from fall injury  Outcome: Progressing     Problem: ABCDS Injury Assessment  Goal: Absence of physical injury  Outcome: Progressing     Problem: ABCDS Injury Assessment  Goal: Absence of physical injury  Outcome: Progressing     Problem: ABCDS Injury Assessment  Goal: Absence of physical injury  Outcome: Progressing     Care plan reviewed with patient.  Patient verbalizes understanding of the plan of care and contribute to goal setting.

## 2024-05-28 NOTE — PROGRESS NOTES
[facility]  INPATIENT PHYSICAL THERAPY  [facility] - [id number]-07/[id number]  Progress Note    Time In: 1355  Time Out: 1505  Timed Code Treatment Minutes: 70 Minutes  Minutes: 70          Date: 2024  Patient Name: Jake Dwyer,  Gender:  male        MRN: [id number]  : 1935  (89 y.o.)  Referral Date : 24  Referring Practitioner: Quintin Boudreaux MD  Diagnosis: S/P total knee arthroplasty, right  Additional Pertinent Hx: Per H&P:  The patient says he began having intermittent right knee buckling/collapse and occasional mild right knee pain started about 1 year ago gradually.  The symptoms progressively worsen and causing abnormal gait and some walking difficulty.  Therefore he saw his family physician more than 1 months ago for the problem.  Lumbar spine abnormality was suspected at the time.  X-ray and later CT of the lumbar spine later was done and he was discovered to have lumbar spinal stenosis.  Initially his PCP wanted to refer him to see orthospine surgeon at [facility] ([facility]) but he requested to see orthopedic spine surgeon who he knew in [place] (? Orth[facility]).  He later went for the scheduled visit with orthospine surgeon in [place] but was found to have right lower extremity \"bowing\" at the knee.  Therefore he saw orthopedic surgeon, Dr. Elan Wilson instead.  X-ray of right knee was done at the time and he was diagnosed of having severe right knee tricompartment osteoarthritis.  He was advised to consider right knee replacement surgery.  Therefore the patient was admitted to [facility] on 2024.  He then underwent right total knee arthroplasty surgery by Dr. Elan Wilson on 2024 at [facility].  He was allowed to weightbearing as tolerated on right lower extremity postoperatively.  On 2024 the patient exhibited symptomatic orthostatic hypotension during the

## 2024-05-28 NOTE — PROGRESS NOTES
This Pre Admission Screen is a refiled document from the acute stay. The Pre Admission was completed and signed on 2024 at 4:17 by Dr. Quintin Boudreaux.       [facility]  Acute Inpatient Rehab Preadmission Assessment     Patient Name: Jake Dwyer        Ethnicity:Not of , /a, or Pashto origin  Race:White  MRN:   [id number]    : 1935  (89 y.o.)  Gender: male      Admitted from: [facility]    Initial Assessment Pre-admission assessment completed via review of faxed medical records and review of patient information with staff. Pre-Admission assessment discussed with physiatrist and approved for admission     Date of admission to the hospital: 2024  Date patient eligible for admission:2024     Primary Diagnosis: S/P Right total knee arthroplasty      Did patient have surgery?  yes - right total knee arthroplasty JACKIE Martel 2024     Physicians: Quintin Boudreaux MD, Dr. Sy     Risk for clinical complications/co-morbidities:   Past Medical History        Past Medical History:   Diagnosis Date    Arthritis      Hyperlipidemia      Hypertension      Macular degeneration, left eye      Neuropathy       legs    PONV (postoperative nausea and vomiting)      Type II or unspecified type diabetes mellitus without mention of complication, not stated as uncontrolled              Financial Information  Primary insurance: Medicare     Secondary Insurance:   [employer] Senior Supplement  [employer]      Has the patient had two or more falls in the past year or any fall with injury in the past year?   no     Did the patient have major surgery during the 100 days prior to admission?  yes     Precautions:   falls, infections, and skin       Isolation Precautions: None                  Physiatrist: Dr. Boudreaux     Patients Occupation: Retired  Reviewed Lab and Diagnostic reports from Current Admission: Yes     Patients Prior  Functional  Level:  Independent prior to surgery      Current functional status for upper extremity ADL’s: Minimal assistance upper body bathing and dressing.      Current functional status for lower extremity ADL’s: Moderate assistance lower body bathing and dressing.      Current functional status for bed, chair, wheelchair transfers: Moderate assistance tranfers.      Current functional status for toilet transfers: Moderate assistance toilet transfers.      Current functional status for locomotion: Minimal assistance ambulation.      Current functional status for bladder management: Minimal contact assistance     Current functional status for bowel management:Moderate assistance     Current functional status for comprehension: Complete independence     Current functional status for expression: Complete independence     Current functional status for social interaction: Complete independence     Current functional status for problem solving: Complete independence     Current functional status for memory: Complete independence     Expected level of Improvement in Self-Care:  Modified independence     Expected level of Improvement in Sphincter Control:  Modified independence     Expected level of Improvement in Transfers: Modified independence     Expected level of Improvement in Locomotion:  Modified independence     Expected level of Improvement in Communication and Social Cognition: Complete independence     Expected length of time to achieve that level of improvement: 2 weeks     Current rehab issues: ADL dysfunction, bladder management, bowel management, carry over of therapy techniques, discharge planning, disease and co-morbidity management, gait/mobility dysfunction, medication management, nutrition and hydration management, Ongoing assessment of safety, Pain management, Patient and family education, Prevention of secondary complications, Skin Integrity.     Required therapy: Physical Therapy, Occupational

## 2024-05-28 NOTE — PLAN OF CARE
Problem: Discharge Planning  Goal: Discharge to home or other facility with appropriate resources  2024 1215 by Soledad Ward LISW  Note:   [facility]  Physical Medicine Case Management Assessment    [x] Inpatient Rehabilitation Unit    Patient Name: Jake Dwyer        MRN: [id number]    : 1935  (89 y.o.)  Gender: male   Date of Admission: 2024  2:15 PM    Family/Social/Home Environment:   Prior to admission, patient was living with his wife, Nora. Patient reported being independent with his ADLs and meal prep. Due to his vision loss, patient needs assistance with errands, housekeeping and driving. They have a  who assists with cleaning the house, and neighbors manage the outside work. Nora completes the laundry and transportation to short distances. Patient reports that Nora is limited due to her own health concerns. Nora has dementia that is progressing at this time. Patient's brother in law, Ted, is another support, who also has health concerns. Ted lives close by and is patient's POA. Patient reports that his neighbors are supportive. Patient's family physician is Jose Alberto Leavitt MD. Patient prefers [facility]. Patient rpeorts having a walker and cane at home. Patient is motivated to participate in therapy and return to his prior level of functioning.     Social/Functional History  Lives With: Spouse (wife has dementia)  Type of Home: House  Home Layout: Two level, Bed/Bath upstairs  Home Access: Stairs to enter with rails  Entrance Stairs - Number of Steps: 3 VIOLA  Entrance Stairs - Rails: Left  Bathroom Shower/Tub: Walk-in shower  Bathroom Toilet: Handicap height  Bathroom Equipment: Shower chair  Home Equipment: Walker - 4-Wheeled, Walker - Rolling, Cane  Has the patient had two or more falls in the past year or any fall with injury in the past year?: No  ADL Assistance: Independent  Homemaking Assistance: Independent  Homemaking Responsibilities:
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
## 2024-05-28 NOTE — PROGRESS NOTES
[facility]  Inpatient Rehabilitation  Occupational Therapy  Progress Note  Time:  Time In: 0930  Time Out: 1100  Timed Code Treatment Minutes: 90 Minutes  Minutes: 90          Date: 2024  Patient Name: Jake Dwyer,   Gender: male      Room: [facility]-A  MRN: [id number]  : 1935  (89 y.o.)  Referring Practitioner: Quintin Boudreaux MD  Diagnosis: s/p total knee arthroscopy  Additional Pertinent Hx: Jake Dwyer is a 89 y.o. 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 Elan Wilson on 2024.  He was allowed to weightbearing as tolerated on right lower extremity postoperatively.  On 2024 the patient exhibited symptomatic orthostatic hypotension during the PT therapy session.  He was treated with IV fluid and holding parameter was placed on his antihypertensive medications.    Restrictions/Precautions:  Restrictions/Precautions: Fall Risk, Up as Tolerated, Weight Bearing  Right Lower Extremity Weight Bearing: Weight Bearing As Tolerated  Position Activity Restriction  Other  stood x 6 min with CGA without UE support and lengthy rest break required post task     BALANCE:  Sitting Balance:  Modified Independent.    Standing Balance: Contact Guard Assistance.      BED MOBILITY:  Not Tested    TRANSFERS:  Sit to Stand:  Contact Guard Assistance. Recliner, w/c, standard chair. Cues for hand placement   Stand to Sit: Contact Guard Assistance. Cues for hand placement and fall prevention     FUNCTIONAL MOBILITY:  Assistive Device: Rolling Walker  Assist Level:  Contact Guard Assistance.   Distance: To and from bathroom and within apartment   '  Mobility also included stepping on weight scale, with skilled education on RW placement and how to safely advance to mimic a curb for IADL community re-turn          Modified Trey Scale:  Not Applicable    ASSESSMENT:  Activity Tolerance:  Patient tolerance of  treatment: Good treatment tolerance       Assessment: Assessment: Patient, \"Ruben\" is making fair progress on IPR but has not met any goals due to short length of stay thus far. He has progressed to a CGA level with his functinoal toilet transfers. At last attempt on evalulation, Ruben requires max A for LB ADLs and min A for UB ADLs. He requires CGA for sinkside grooming and is limited by standing tolerance & balance with unilateral and bilateral UE release. His standing tolerance has improved to 6 minutes within IADLs, but requires lengthy rest breaks and demo's unsteadiness during. He requires CGA to min A for short distance ADL / IADL mobility and fatigues quickly. Ruben cont to require skilled OT to improve safety and indep with ADL and IADL tasks prior to discharge to home.  Discharge Recommendations: Continue to assess pending progress  Equipment Recommendations: Other: TBD. Pt has shower chair, rollator, RW, cane. May benefit from safety frame over toilet  Plan: Times Per Week: 5x per week for 90 minutes  Times Per Day: Once a day  Current Treatment Recommendations: Strengthening, Balance

## 2024-05-28 NOTE — PROGRESS NOTES
Physical Medicine & Rehabilitation Progress Note    Chief Complaint:  Status post right total knee replacement     Subjective:    Jake Dwyer is a 89 y.o. 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       The patient says he began having intermittent right knee buckling/collapse and occasional mild right knee pain started about 1 year ago gradually.  The symptoms progressively worsen and causing abnormal gait and some walking difficulty.  Therefore he saw his family physician more than 1 months ago for the problem.  Lumbar spine abnormality was suspected at the time.  X-ray and later CT of the lumbar spine later was done and he was discovered to have lumbar spinal stenosis.  Initially his PCP wanted to refer him to see orthospine surgeon at [facility] ([facility]) but he requested to see orthopedic spine surgeon who he knew in [place] ([facility]).  He later went for the scheduled visit with orthospine surgeon in [place] but was found to have right lower extremity \"bowing\" at the knee.  Therefore he saw orthopedic surgeon,  [facility]    Sodium 140 135 - 145 meq/L    Potassium 4.7 3.5 - 5.2 meq/L    Chloride 104 98 - 111 meq/L    CO2 24 23 - 33 meq/L    Glucose 153 (H) 70 - 108 mg/dL    BUN 17 7 - 22 mg/dL    Creatinine 0.7 0.4 - 1.2 mg/dL    Calcium 8.5 8.5 - 10.5 mg/dL   CBC with Auto Differential    Collection Time: 05/29/24  5:56 AM   Result Value Ref [facility]    WBC 7.2 4.8 - 10.8 thou/mm3    RBC 2.59 (L) 4.70 - 6.10 mill/mm3    Hemoglobin 9.3 (L) 14.0 - 18.0 gm/dl    Hematocrit 28.7 (L) 42.0 - 52.0 %    .8 (H) 80.0 - 94.0 fL    MCH 35.9 (H) 26.0 - 33.0 pg    MCHC 32.4 32.2 - 35.5 gm/dl    RDW-CV 14.5 11.5 - 14.5 %    RDW-SD 59.1 (H) 35.0 - 45.0 fL    Platelets 282 130 - 400 thou/mm3    MPV 9.4 9.4 - 12.4 fL    Seg Neutrophils 65.7 %    Lymphocytes 17.6 %    Monocytes % 11.4 %    Eosinophils 3.9 %    Basophils 0.6 %    Immature Granulocytes % 0.8 %    Neutrophils Absolute 4.7 1.8 - 7.7 thou/mm3    Lymphocytes Absolute 1.3 1.0 - 4.8 thou/mm3    Monocytes Absolute 0.8 0.4 - 1.3 thou/mm3    Eosinophils Absolute 0.3 0.0 - 0.4 thou/mm3    Basophils Absolute 0.0 0.0 - 0.1 thou/mm3    Immature Grans (Abs) 0.06 0.00 - 0.07 thou/mm3    nRBC 0 /100 wbc    Macrocytes PRESENT Absent   Anion Gap    Collection Time: 05/29/24  5:56 AM   Result Value Ref [facility]    Anion Gap 12.0 8.0 - 16.0 meq/L   Glomerular Filtration Rate, Estimated    Collection Time: 05/29/24  5:56 AM   Result Value Ref [facility]    Est, Glom Filt Rate 88 >60 ml/min/1.73m2   POCT glucose    Collection Time: 05/29/24  8:51 AM   Result Value Ref [facility]    POC Glucose 172 (H) 70 - 108 mg/dl       Impression:  Debility/physical decondition after right total knee arthroplasty surgery complicated by hypotension episode  Status post right total knee arthroplasty surgery for right knee severe tricompartmental osteoarthritis  Irregular heartbeat/arrhythmia due to atrial fibrillation requiring anticoagulation therapy with Eliquis  Hypertension  Orthostatic

## 2024-05-28 NOTE — PLAN OF CARE
Problem: Discharge Planning  Goal: Discharge to home or other facility with appropriate resources  5/28/2024 1125 by Emra Collins LPN  Outcome: Progressing  Flowsheets (Taken 5/27/2024 2125 by Ora Wilkins RN)  Discharge to home or other facility with appropriate resources: Identify barriers to discharge with patient and caregiver  5/28/2024 0231 by Ora Wilkins RN  Outcome: Progressing  Flowsheets (Taken 5/27/2024 2125)  Discharge to home or other facility with appropriate resources: Identify barriers to discharge with patient and caregiver     Problem: Chronic Conditions and Co-morbidities  Goal: Patient's chronic conditions and co-morbidity symptoms are monitored and maintained or improved  5/28/2024 1125 by Erma Collins LPN  Outcome: Progressing  Flowsheets (Taken 5/27/2024 2125 by Ora Wilkins RN)  Care Plan - Patient's Chronic Conditions and Co-Morbidity Symptoms are Monitored and Maintained or Improved: Monitor and assess patient's chronic conditions and comorbid symptoms for stability, deterioration, or improvement  5/28/2024 0231 by Ora Wilkins RN  Outcome: Progressing  Flowsheets (Taken 5/27/2024 2125)  Care Plan - Patient's Chronic Conditions and Co-Morbidity Symptoms are Monitored and Maintained or Improved: Monitor and assess patient's chronic conditions and comorbid symptoms for stability, deterioration, or improvement     Problem: Pain  Goal: Verbalizes/displays adequate comfort level or baseline comfort level  5/28/2024 1125 by Erma Collins LPN  Outcome: Progressing  Flowsheets (Taken 5/27/2024 2125 by Ora Wilkins RN)  Verbalizes/displays adequate comfort level or baseline comfort level: Encourage patient to monitor pain and request assistance  5/28/2024 0231 by Ora Wilkins RN  Outcome: Progressing  Flowsheets (Taken 5/27/2024 2125)  Verbalizes/displays adequate comfort level or baseline comfort level: Encourage patient to monitor pain and

## 2024-05-28 NOTE — PROGRESS NOTES
[facility]  Individualized Disclosure Statement      Patient: Jake Dwyer      Scope of Service  [facility] provides 24 hour individualized service to patients with functional limitations due to, but not limited to: stroke, brain injury, spinal cord injury, major multiple trauma, fractures, amputation, and neurological disorders. The [facility] Center provides rehabilitative nursing and medical services as well as physical, occupational, speech, and recreation therapies.  [facility] is fully accredited by the Commission on Accreditation of Rehabilitation Facilities (CARF) as a comprehensive provider of rehabilitation services.  Patients admitted to the [facility] receive a minimum of three hours of therapy per day, at least five days per week, with a revised therapy schedule on weekends and holidays.  Physical therapy, occupational therapy, and speech therapy are provided seven days per week including holidays.  Other therapeutic services are available on weekends and evenings as needed or scheduled.  Intensity of Treatment  Your treatment program will consist of Nursing Care and:  1.5 hours of Physical Therapy, per day  1.5 hours of Occupational Therapy, per day   30-60 minutes of Speech Therapy, per day  1 hour of Recreational Therapy, per week  Depending on your needs, the exact time spent with each of the above disciplines may fluctuate, however you will receive at least 3 hours of therapy at least 5 days per week.    [facility] maintains contracts with most insurance plans.  Depending on the type of coverage, the insurance may impose limits on the coverage for rehabilitation care.  Coverage is based on the premise that you are able to fully participate in the rehabilitation program and show continued progress. Please verify your own insurance information. A

## 2024-05-28 NOTE — PROGRESS NOTES
Eliquis, plavix  Diabetic: Yes: Home Meds: glimepiride.  Hospital Meds: glimeperide.  Educational Needs: Incision healing as a diabetic.    Consultations/Labs/X-rays: Vascular duplex lower right extremity 5/25/24  Oxygen while on IP Rehab:  No.  Home oxygen: No  Patient/Family Education Focus: Safe transfers with increasing mobility   Barriers to Education: Poor vision    Recent Labs     05/27/24  2121 05/28/24  0719 05/29/24  0851   POCGLU 147* 168* 172*       Lab Results   Component Value Date    LDLDIRECT 54 03/08/2024         Vitals:    05/29/24 0513 05/29/24 0538 05/29/24 0608 05/29/24 0855   BP:    116/65   Pulse:    (!) 105   Resp:  18 18 18   Temp:    98.2 °F (36.8 °C)   TempSrc:    Oral   SpO2:    98%   Weight: 100.1 kg (220 lb 10.9 oz)      Height:              Family Education: Family available and participating in education   Fall Risk:  Falling star program initiated  Is the patient appropriate for a stay in the functional apartment? no    Discharge Plan   Estimated Discharge Date:  6/7/2024    Destination: home health and discharge home with supervision  Services at Discharge: Home Health  Physical Therapy, Occupational Therapy, Nursing, and HHA 3x week  Is patient appropriate for an outpatient driving evaluation? No. Patient does not drive  Equipment at Discharge: Other: TBD. Pt has shower chair, rollator, RW, cane. May benefit from safety frame over toilet  Factors facilitating achievement of predicted outcomes: Family support, Motivated, Cooperative, and Pleasant  Barriers to the achievement of predicted outcomes: Pain, Limited family support, Impulsivity, Limited safety awareness, Limited insight into deficits, Unrealistic expectations, Decreased endurance, Lower extremity weakness, Impaired vision, Stairs at home, Skin Care, and Wound Care  Follow up with physiatrist? no  If yes, what timeframe? N/A    Team Members Present at Conference:  :NERY Dumas  Occupational  Therapist:Barbi Ng OTR/L [id number]  Physical Therapist:Faby Villalobos, [place] [id number]  Speech Therapist:Ines Jones MS, CCC-SLP [id number]  Nurse:Malinda Birch RN  Psychologist: Hubert Rsoales Psy.D     I approve the established interdisciplinary plan of care as documented within the medical record of Jake Dwyer. I was present and led the interdisciplinary care conference.    MICHEL ALLISON MD  Electronically signed by MICHEL ALLISON MD on 5/29/2024 at 9:57 AM

## 2024-05-28 NOTE — PROGRESS NOTES
Eneemez given without results. No stool noted at rectum prior to admin.     1831 Pt had large bowel movement. Asking for writer to wait on administering fleets enema

## 2024-05-28 NOTE — CARE COORDINATION
Hypotensive with therapy today at 1400. Oxycodone had been given 1 hour prior. Dr. Boudreaux updated. Medication tapered across the day instead of ordered to be given all in the morning at once. diastolic continues to be soft at this time. Pt asymptomatic. C/O constipation. Last bowel movement 5/26. MOM, Miralax, scheduled colace and Enemeez given. Pt eating prunes at bedside. Stated prunes usually help.

## 2024-05-29 LAB
ANION GAP SERPL CALC-SCNC: 12 MEQ/L (ref 8–16)
BASOPHILS ABSOLUTE: 0 THOU/MM3 (ref 0–0.1)
BASOPHILS NFR BLD AUTO: 0.6 %
BUN SERPL-MCNC: 17 MG/DL (ref 7–22)
CALCIUM SERPL-MCNC: 8.5 MG/DL (ref 8.5–10.5)
CHLORIDE SERPL-SCNC: 104 MEQ/L (ref 98–111)
CO2 SERPL-SCNC: 24 MEQ/L (ref 23–33)
CREAT SERPL-MCNC: 0.7 MG/DL (ref 0.4–1.2)
DEPRECATED RDW RBC AUTO: 59.1 FL (ref 35–45)
EOSINOPHIL NFR BLD AUTO: 3.9 %
EOSINOPHILS ABSOLUTE: 0.3 THOU/MM3 (ref 0–0.4)
ERYTHROCYTE [DISTWIDTH] IN BLOOD BY AUTOMATED COUNT: 14.5 % (ref 11.5–14.5)
GFR SERPL CREATININE-BSD FRML MDRD: 88 ML/MIN/1.73M2
GLUCOSE BLD STRIP.AUTO-MCNC: 172 MG/DL (ref 70–108)
GLUCOSE SERPL-MCNC: 153 MG/DL (ref 70–108)
HCT VFR BLD AUTO: 28.7 % (ref 42–52)
HGB BLD-MCNC: 9.3 GM/DL (ref 14–18)
IMM GRANULOCYTES # BLD AUTO: 0.06 THOU/MM3 (ref 0–0.07)
IMM GRANULOCYTES NFR BLD AUTO: 0.8 %
LYMPHOCYTES ABSOLUTE: 1.3 THOU/MM3 (ref 1–4.8)
LYMPHOCYTES NFR BLD AUTO: 17.6 %
MACROCYTES BLD QL SMEAR: PRESENT
MCH RBC QN AUTO: 35.9 PG (ref 26–33)
MCHC RBC AUTO-ENTMCNC: 32.4 GM/DL (ref 32.2–35.5)
MCV RBC AUTO: 110.8 FL (ref 80–94)
MONOCYTES ABSOLUTE: 0.8 THOU/MM3 (ref 0.4–1.3)
MONOCYTES NFR BLD AUTO: 11.4 %
NEUTROPHILS ABSOLUTE: 4.7 THOU/MM3 (ref 1.8–7.7)
NEUTROPHILS NFR BLD AUTO: 65.7 %
NRBC BLD AUTO-RTO: 0 /100 WBC
PLATELET # BLD AUTO: 282 THOU/MM3 (ref 130–400)
PMV BLD AUTO: 9.4 FL (ref 9.4–12.4)
POTASSIUM SERPL-SCNC: 4.7 MEQ/L (ref 3.5–5.2)
RBC # BLD AUTO: 2.59 MILL/MM3 (ref 4.7–6.1)
SODIUM SERPL-SCNC: 140 MEQ/L (ref 135–145)
WBC # BLD AUTO: 7.2 THOU/MM3 (ref 4.8–10.8)

## 2024-05-29 PROCEDURE — 97110 THERAPEUTIC EXERCISES: CPT

## 2024-05-29 PROCEDURE — 97530 THERAPEUTIC ACTIVITIES: CPT

## 2024-05-29 PROCEDURE — 97535 SELF CARE MNGMENT TRAINING: CPT

## 2024-05-29 PROCEDURE — 1180000000 HC REHAB R&B

## 2024-05-29 PROCEDURE — 80048 BASIC METABOLIC PNL TOTAL CA: CPT

## 2024-05-29 PROCEDURE — 6370000000 HC RX 637 (ALT 250 FOR IP): Performed by: PHYSICAL MEDICINE & REHABILITATION

## 2024-05-29 PROCEDURE — 36415 COLL VENOUS BLD VENIPUNCTURE: CPT

## 2024-05-29 PROCEDURE — 97140 MANUAL THERAPY 1/> REGIONS: CPT

## 2024-05-29 PROCEDURE — 99232 SBSQ HOSP IP/OBS MODERATE 35: CPT | Performed by: PHYSICAL MEDICINE & REHABILITATION

## 2024-05-29 PROCEDURE — 6370000000 HC RX 637 (ALT 250 FOR IP): Performed by: FAMILY MEDICINE

## 2024-05-29 PROCEDURE — 82948 REAGENT STRIP/BLOOD GLUCOSE: CPT

## 2024-05-29 PROCEDURE — 85025 COMPLETE CBC W/AUTO DIFF WBC: CPT

## 2024-05-29 PROCEDURE — 97116 GAIT TRAINING THERAPY: CPT

## 2024-05-29 RX ORDER — PREGABALIN 25 MG/1
25 CAPSULE ORAL EVERY 12 HOURS
Status: DISCONTINUED | OUTPATIENT
Start: 2024-05-29 | End: 2024-06-06 | Stop reason: HOSPADM

## 2024-05-29 RX ADMIN — TRAZODONE HYDROCHLORIDE 50 MG: 50 TABLET ORAL at 20:52

## 2024-05-29 RX ADMIN — ACETAMINOPHEN 650 MG: 325 TABLET ORAL at 14:32

## 2024-05-29 RX ADMIN — PREGABALIN 25 MG: 25 CAPSULE ORAL at 20:53

## 2024-05-29 RX ADMIN — DICLOFENAC SODIUM 2 G: 10 GEL TOPICAL at 13:05

## 2024-05-29 RX ADMIN — CALCIUM 500 MG: 500 TABLET ORAL at 09:00

## 2024-05-29 RX ADMIN — ACETAMINOPHEN 650 MG: 325 TABLET ORAL at 09:00

## 2024-05-29 RX ADMIN — PANTOPRAZOLE SODIUM 40 MG: 40 TABLET, DELAYED RELEASE ORAL at 09:00

## 2024-05-29 RX ADMIN — ASPIRIN 81 MG: 81 TABLET, COATED ORAL at 09:00

## 2024-05-29 RX ADMIN — Medication 1000 UNITS: at 09:00

## 2024-05-29 RX ADMIN — APIXABAN 5 MG: 5 TABLET, FILM COATED ORAL at 09:00

## 2024-05-29 RX ADMIN — OXYCODONE 5 MG: 5 TABLET ORAL at 16:50

## 2024-05-29 RX ADMIN — Medication 6 MG: at 20:52

## 2024-05-29 RX ADMIN — ACETAMINOPHEN 650 MG: 325 TABLET ORAL at 20:53

## 2024-05-29 RX ADMIN — METOPROLOL TARTRATE 12.5 MG: 25 TABLET, FILM COATED ORAL at 08:59

## 2024-05-29 RX ADMIN — DICLOFENAC SODIUM 2 G: 10 GEL TOPICAL at 16:47

## 2024-05-29 RX ADMIN — DOXAZOSIN 4 MG: 4 TABLET ORAL at 20:52

## 2024-05-29 RX ADMIN — METOPROLOL TARTRATE 12.5 MG: 25 TABLET, FILM COATED ORAL at 20:53

## 2024-05-29 RX ADMIN — APIXABAN 5 MG: 5 TABLET, FILM COATED ORAL at 20:52

## 2024-05-29 RX ADMIN — Medication 1 TABLET: at 09:00

## 2024-05-29 RX ADMIN — ISOSORBIDE MONONITRATE 30 MG: 30 TABLET, EXTENDED RELEASE ORAL at 09:00

## 2024-05-29 RX ADMIN — CALCIUM 500 MG: 500 TABLET ORAL at 16:47

## 2024-05-29 RX ADMIN — POLYETHYLENE GLYCOL 3350 17 G: 17 POWDER, FOR SOLUTION ORAL at 09:00

## 2024-05-29 RX ADMIN — OXYCODONE 5 MG: 5 TABLET ORAL at 10:49

## 2024-05-29 RX ADMIN — CLOPIDOGREL BISULFATE 75 MG: 75 TABLET ORAL at 09:00

## 2024-05-29 RX ADMIN — OXYCODONE 5 MG: 5 TABLET ORAL at 05:38

## 2024-05-29 RX ADMIN — MAGNESIUM HYDROXIDE 15 ML: 1200 LIQUID ORAL at 09:01

## 2024-05-29 RX ADMIN — NALOXEGOL OXALATE 12.5 MG: 12.5 TABLET, FILM COATED ORAL at 05:38

## 2024-05-29 RX ADMIN — SENNOSIDES 17.2 MG: 8.6 TABLET, FILM COATED ORAL at 20:53

## 2024-05-29 RX ADMIN — DOCUSATE SODIUM 100 MG: 100 CAPSULE, LIQUID FILLED ORAL at 09:00

## 2024-05-29 RX ADMIN — DOCUSATE SODIUM 100 MG: 100 CAPSULE, LIQUID FILLED ORAL at 20:52

## 2024-05-29 ASSESSMENT — PAIN DESCRIPTION - LOCATION: LOCATION: LEG

## 2024-05-29 ASSESSMENT — PAIN - FUNCTIONAL ASSESSMENT: PAIN_FUNCTIONAL_ASSESSMENT: ACTIVITIES ARE NOT PREVENTED

## 2024-05-29 ASSESSMENT — PAIN SCALES - GENERAL
PAINLEVEL_OUTOF10: 7
PAINLEVEL_OUTOF10: 7
PAINLEVEL_OUTOF10: 0
PAINLEVEL_OUTOF10: 1
PAINLEVEL_OUTOF10: 8

## 2024-05-29 ASSESSMENT — PAIN DESCRIPTION - DESCRIPTORS: DESCRIPTORS: ACHING;BURNING

## 2024-05-29 ASSESSMENT — PAIN DESCRIPTION - ORIENTATION: ORIENTATION: RIGHT

## 2024-05-29 NOTE — PROGRESS NOTES
[facility]  INPATIENT PHYSICAL THERAPY  [facility] - 8K-07/007-A  Daily Note    Time In: 1035  Time Out: 1135  Timed Code Treatment Minutes: 60 Minutes  Minutes: 60          Date: 2024  Patient Name: Jake Dwyer,  Gender:  male        MRN: [id number]  : 1935  (89 y.o.)  Referral Date : 24  Referring Practitioner: Quintin Boudreaux MD  Diagnosis: S/P total knee arthroplasty, right  Additional Pertinent Hx: Per H&P:  The patient says he began having intermittent right knee buckling/collapse and occasional mild right knee pain started about 1 year ago gradually.  The symptoms progressively worsen and causing abnormal gait and some walking difficulty.  Therefore he saw his family physician more than 1 months ago for the problem.  Lumbar spine abnormality was suspected at the time.  X-ray and later CT of the lumbar spine later was done and he was discovered to have lumbar spinal stenosis.  Initially his PCP wanted to refer him to see orthospine surgeon at [facility] ([facility]) but he requested to see orthopedic spine surgeon who he knew in [place] (? [facility]).  He later went for the scheduled visit with orthospine surgeon in [place] but was found to have right lower extremity \"bowing\" at the knee.  Therefore he saw orthopedic surgeon, Dr. Elan Wilson instead.  X-ray of right knee was done at the time and he was diagnosed of having severe right knee tricompartment osteoarthritis.  He was advised to consider right knee replacement surgery.  Therefore the patient was admitted to [facility] on 2024.  He then underwent right total knee arthroplasty surgery by Dr. Elan Wilson on 2024 at [facility].  He was allowed to weightbearing as tolerated on right lower extremity postoperatively.  On 2024 the patient exhibited symptomatic orthostatic hypotension during the PT  therapy session.  He was treated with IV fluid and holding parameter was placed on his antihypertensive medications.     Prior Level of Function:  Lives With: Spouse (wife has dementia)  Type of Home: House  Home Layout: Two level, Bed/Bath upstairs  Home Access: Stairs to enter with rails  Entrance Stairs - Number of Steps: 3 VIOLA  Entrance Stairs - Rails: Left  Home Equipment: Walker - 4-Wheeled, Walker - Rolling, Cane   Bathroom Shower/Tub: Walk-in shower  Bathroom Toilet: Handicap height  Bathroom Equipment: Shower chair    ADL Assistance: Independent  Homemaking Assistance: Independent  Homemaking Responsibilities: Yes  Ambulation Assistance: Independent  Transfer Assistance: Independent  Active : No  Additional Comments: Typically ambulates with 4WW. Has RW that stays upstairs. Uses cane to go up and down steps. Indep with ADls and most IADls. 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    Restrictions/Precautions:  Restrictions/Precautions: Fall Risk, Up as Tolerated, Weight Bearing  Right Lower Extremity Weight Bearing: Weight Bearing As Tolerated  Position Activity Restriction  Other position/activity restrictions: monitor orthostatics     SUBJECTIVE:  Pt was sitting in bedside chair when PT arrived. Pt agreeable to PT session. He complains of RLE swelling     Pain: 7/10: Pt reports pain with ambulation. Nursing staff provides pain medications in the beginning of session    Vitals:   Patient Vitals for the past 8 hrs:   BP Patient Position Temp Temp src Pulse Resp SpO2 O2 Device   05/29/24 1303 93/60 -- -- Oral 75 -- -- None (Room air)   05/29/24 1158 (!) 88/57 Sitting -- Temporal 76 -- 97 % --   05/29/24 1119 -- -- -- -- -- 18 -- --   05/29/24 0855 116/65 Sitting 98.2 °F (36.8 °C) Oral (!) 105 18 98 % None (Room air)   05/29/24 0608 -- -- -- -- -- 18 -- --   05/29/24 0538 -- -- -- -- -- 18 -- --     *Vitals may reflect data entered into the flowsheet prior to or after PT session

## 2024-05-29 NOTE — FLOWSHEET NOTE
05/29/24 1204   Treatment Team Notification   Reason for Communication Abnormal vitals   Name of Team Member Notified Dr. Sy   Treatment Team Role Attending Provider   Method of Communication Secure Message   Response No new orders   Notification Time 1203     BP 88/56 P 76. Entresto not given. Dr. Sy notified.    Detail Level: Zone

## 2024-05-29 NOTE — PROGRESS NOTES
[facility]  Recreational Therapy  Inpatient Rehabilitation Evaluation        Time Spent with Patient: 30 minutes    Date:  5/29/2024       Patient Name: Jake Dwyer      MRN: [id number]       YOB: 1935 (89 y.o.)       Gender: male  Diagnosis: s/p total knee arthroscopy  Referring Practitioner: Quintin Boudreaux MD    RESTRICTIONS/PRECAUTIONS:  Restrictions/Precautions: Fall Risk, Up as Tolerated, Weight Bearing     Hearing: Within functional limits    PAIN: 0-sitting in recliner with ice on knee but once he starts to get up his pain increases    SUBJECTIVE:  pt lives with 2nd wife who has dementia-he and his first wife who passed away adopted a baby girl and she passed away at 4 yrs old (she never crawled or walked and did not expect to live past 2 yrs old)-his 2nd wife had 3 children ( son passed away at 19 yrs old and 2 daughters live away and have no contact with them -he has supportive neighbors    VISION:  Visual Impairment-prosthetic R eye and macular degeneration in L eye     HEARING: Within Normal Limit    LEISURE INTERESTS:   Pt has been involved in [employer] since he has been 15 yrs old-he has travelled all over the [place] singing and competing at different levels-pt states he plays a little piano mostly just chords but now that his vision is bad he doesn't play anymore-he sings a few nights a week and on the weekends -pt very pleasant and social-loves listening to music-pt states he enjoys getting on his computer at home     BARRIERS TO LEISURE INTERESTS:    Decreased endurance, Impaired vision, Lower extremity weakness, and Pain           Patient Education  New Education Provided: Importance of Leisure, RT Plan of Care    Plan:  Continue to follow patient through this admission  Include patient in appropriate groups  See patient individually    Electronically signed by: Katalina Erickson, CTRS  Date: 5/29/2024

## 2024-05-29 NOTE — PLAN OF CARE
Problem: Discharge Planning  Goal: Discharge to home or other facility with appropriate resources  5/29/2024 1147 by Soledad Ward LISW  Note: Team conference held Wednesday, 5/29. Recommendations of the team were explained to the patient by Dr Boudreaux and SW. Team is recommending that patient continue on acute inpatient rehab for PT and OT, with expected discharge date of Friday, 6/7. Following discharge, team is recommending home health services for RN, PT, OT and HHA. Care plan reviewed with patient.  Patient verbalized understanding of the plan of care and contributed to goal setting. SW to follow and maintain involvement in discharge planning.

## 2024-05-29 NOTE — PLAN OF CARE
Problem: Discharge Planning  Goal: Discharge to home or other facility with appropriate resources  5/29/2024 1058 by Erma Collins LPN  Outcome: Progressing  Flowsheets (Taken 5/28/2024 2248 by Ricardo Cason LPN)  Discharge to home or other facility with appropriate resources: Identify barriers to discharge with patient and caregiver  5/28/2024 2248 by Ricardo Cason LPN  Outcome: Progressing  Flowsheets (Taken 5/28/2024 2248)  Discharge to home or other facility with appropriate resources: Identify barriers to discharge with patient and caregiver     Problem: Chronic Conditions and Co-morbidities  Goal: Patient's chronic conditions and co-morbidity symptoms are monitored and maintained or improved  5/29/2024 1058 by Erma Collins LPN  Outcome: Progressing  Flowsheets (Taken 5/29/2024 1058)  Care Plan - Patient's Chronic Conditions and Co-Morbidity Symptoms are Monitored and Maintained or Improved: Monitor and assess patient's chronic conditions and comorbid symptoms for stability, deterioration, or improvement  5/28/2024 2248 by Ricardo Cason LPN  Outcome: Progressing  Flowsheets (Taken 5/28/2024 2248)  Care Plan - Patient's Chronic Conditions and Co-Morbidity Symptoms are Monitored and Maintained or Improved:   Monitor and assess patient's chronic conditions and comorbid symptoms for stability, deterioration, or improvement   Collaborate with multidisciplinary team to address chronic and comorbid conditions and prevent exacerbation or deterioration     Problem: Pain  Goal: Verbalizes/displays adequate comfort level or baseline comfort level  5/29/2024 1058 by Erma Collins LPN  Outcome: Progressing  Flowsheets (Taken 5/28/2024 2248 by Ricardo Cason LPN)  Verbalizes/displays adequate comfort level or baseline comfort level:   Encourage patient to monitor pain and request assistance   Assess pain using appropriate pain scale   Administer analgesics based on type and severity of

## 2024-05-29 NOTE — PROGRESS NOTES
Patient: Jake Dwyer  Unit/Bed: 8K-07/007-A  YOB: 1935  MRN: [id number] Acct: [id number]   Admitting Diagnosis: History of total knee arthroplasty [Z96.659]  S/P total knee arthroplasty, right [Z96.651]  Admit Date:  5/25/2024  Hospital Day: 4    Assessment:     Principal Problem:    S/P total knee arthroplasty, right  Active Problems:    Debility    Orthostatic hypotension    Class 1 obesity in adult    Primary hypertension    Mixed hyperlipidemia    Controlled type 2 diabetes mellitus with hyperglycemia (HCC)    Right leg swelling    Chronic atrial fibrillation (HCC)    Macular degeneration of left eye    Vision loss, right eye    Obstructive sleep apnea  Resolved Problems:    * No resolved hospital problems. *      Plan:     The patient told me that his CS was checked after breakfast. I asked him that in the future if the breakfast tray comes and it's not been checked, to call the nurse for a check before he eats.  Follow the variable BP and Pulse.        Subjective:     Patient has no complaint of CP or SOB..   Medication side effects: none    Scheduled Meds:   magnesium hydroxide  15 mL Oral Daily    naloxegol  12.5 mg Oral QAM AC    polyethylene glycol  17 g Oral Daily    metoprolol tartrate  12.5 mg Oral Q12H    sacubitril-valsartan  1 tablet Oral Lunch    calcium elemental  500 mg Oral BID WC    apixaban  5 mg Oral BID    doxazosin  4 mg Oral Nightly    pantoprazole  40 mg Oral Daily    aspirin  81 mg Oral Daily    clopidogrel  75 mg Oral Daily    furosemide  20 mg Oral Daily    isosorbide mononitrate  30 mg Oral Daily    docusate sodium  100 mg Oral BID    senna  2 tablet Oral Nightly    acetaminophen  650 mg Oral Q6H    multivitamin  1 tablet Oral Daily    Vitamin D  1,000 Units Oral Daily    melatonin  6 mg Oral Nightly     Continuous Infusions:  PRN Meds:diclofenac sodium, docusate sodium, sodium phosphate, cyclobenzaprine, bisacodyl, magnesium hydroxide, acetaminophen, oxyCODONE

## 2024-05-29 NOTE — PROGRESS NOTES
[facility]  Occupational Therapy  Daily Note  Time:    Time In: 0700  Time Out: 0830  Timed Code Treatment Minutes: 90 Minutes  Minutes: 90        Date: 2024  Patient Name: Jake Dwyer,   Gender: male      Room: [facility]07/007-A  MRN: [id number]  : 1935  (89 y.o.)  Referring Practitioner: Quintin Boudreaux MD  Diagnosis: s/p total knee arthroscopy  Additional Pertinent Hx: Jake Dwyer is a 89 y.o. 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 Elan Wilson on 2024.  He was allowed to weightbearing as tolerated on right lower extremity postoperatively.  On 2024 the patient exhibited symptomatic orthostatic hypotension during the PT therapy session.  He was treated with IV fluid and holding parameter was placed on his antihypertensive medications.    Restrictions/Precautions:  Restrictions/Precautions: Fall Risk, Up as Tolerated, Weight Bearing  Right Lower Extremity Weight Bearing: Weight Bearing As Tolerated  Position Activity

## 2024-05-29 NOTE — PROGRESS NOTES
[facility]  INPATIENT PHYSICAL THERAPY  [facility] - 8K-07/007-A  Daily Note    Time In: 1430  Time Out: 1500  Timed Code Treatment Minutes: 30 Minutes  Minutes: 30          Date: 2024  Patient Name: Jake Dwyer,  Gender:  male        MRN: [id number]  : 1935  (89 y.o.)  Referral Date : 24  Referring Practitioner: Quintin Boudreaux MD  Diagnosis: S/P total knee arthroplasty, right  Additional Pertinent Hx: Per H&P:  The patient says he began having intermittent right knee buckling/collapse and occasional mild right knee pain started about 1 year ago gradually.  The symptoms progressively worsen and causing abnormal gait and some walking difficulty.  Therefore he saw his family physician more than 1 months ago for the problem.  Lumbar spine abnormality was suspected at the time.  X-ray and later CT of the lumbar spine later was done and he was discovered to have lumbar spinal stenosis.  Initially his PCP wanted to refer him to see orthospine surgeon at [facility] ([facility]) but he requested to see orthopedic spine surgeon who he knew in [place] (? [facility]).  He later went for the scheduled visit with orthospine surgeon in [place] but was found to have right lower extremity \"bowing\" at the knee.  Therefore he saw orthopedic surgeon, Dr. Elan Wilson instead.  X-ray of right knee was done at the time and he was diagnosed of having severe right knee tricompartment osteoarthritis.  He was advised to consider right knee replacement surgery.  Therefore the patient was admitted to [facility] on 2024.  He then underwent right total knee arthroplasty surgery by Dr. Elan Wilson on 2024 at [facility].  He was allowed to weightbearing as tolerated on right lower extremity postoperatively.  On 2024 the patient exhibited symptomatic orthostatic hypotension during the PT  Education: Plan of Care, Functional Mobility, Reviewed Prior Education, Health Promotion and Wellness Education, Safety, Verbal Exercise Instruction, Precautions/Restrictions, Transfers, Gait, Home Safety Education, - Patient Requires Continued Education    Goals:  Patient Goals : decrease pain  Short Term Goals  Time Frame for Short Term Goals: 1 week  Short Term Goal 1: Patient will complete sit < > stand with CGA to stand to ambulate with decreased difficulty.  Short Term Goal 2: Patient will complete supine < > sit with CGA, head of bed flat, no rails to transfer in and out of bed safely.  Short Term Goal 3: Patient will ambulate 50' with a RW and CGA to increase mobility and progress towards navigating home safely.  Short Term Goal 4: Patient will ascend/descend 1, 4\" step in the parallel bars with CGA to progress towards safe home entry.  Short Term Goal 5: Patient will increase right knee AROM to 90 degrees to reduce pain and increase ease with transfers.  Long Term Goals  Time Frame for Long Term Goals : 3 weeks  Long Term Goal 1: Patient will complete supine < > sit with modified independence to transfer in and out of bed safely.  Long Term Goal 2: Patient will complete sit < > stand with modified independence to stand to ambulate safely.  Long Term Goal 3: Patient will complete bed < > chair transfer with a RW with modified independence transfer surface to surface safely.  Long Term Goal 4: Patient will ambulate 150' with a RW with modified independence to navigate home safely.  Long Term Goal 5: Patient will ascend/descend 3 steps with 1 hand rail and SBA to access/leave home.  Additional Goals?: Yes  Long term goal 6: Patient complete a car transfer with modified independence to transfer in and out of vehicle safely.    Following session, patient left in safe position with all fall risk precautions in place.

## 2024-05-29 NOTE — PROGRESS NOTES
Physical Medicine & Rehabilitation Progress Note    Chief Complaint:  Status post right total knee replacement     Subjective:    Jake Dwyer is a 89 y.o. 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       The patient says he began having intermittent right knee buckling/collapse and occasional mild right knee pain started about 1 year ago gradually.  The symptoms progressively worsen and causing abnormal gait and some walking difficulty.  Therefore he saw his family physician more than 1 months ago for the problem.  Lumbar spine abnormality was suspected at the time.  X-ray and later CT of the lumbar spine later was done and he was discovered to have lumbar spinal stenosis.  Initially his PCP wanted to refer him to see orthospine surgeon at [facility] ([facility]) but he requested to see orthopedic spine surgeon who he knew in [place] ([facility]).  He later went for the scheduled visit with orthospine surgeon in [place] but was found to have right lower extremity \"bowing\" at the knee.  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  His left upper extremity muscular pain seem to be better with Voltaren gel application.  The left upper extremity burning feeling also appeared to reduce with Lyrica usage.  He continues having pain at his right lower extremity from thigh down to leg.  He still has significant weakness at right lower extremity.  He is tolerating the intensive rehabilitation treatment well.  His function is improving very slowly.    The patient currently is projected to be ready for discharge on 6/7/2024.      Plan:  Continue intensive PT/OT/RT inpatient rehabilitation program at least 3 hours per day, 5 days per week in order to improve functional status prior to discharge.  Family education and training will be completed.  Equipment evaluations and recommendations will be completed as appropriate.       Rehabilitation nursing continue to be involved for bowel, bladder, skin, and pain management.  Nursing will also provide education and training to patient and family.    Prophylaxis:  DVT: Patient on Eliquis, JUANITA stocking, intermittent pneumatic compression device.  GI: Colace, Senokot, Dulcolax suppository as needed, milk of magnesium as needed, GlycoLax as needed.  Pain: Tylenol, Tylenol as needed, oxycodone 2.5 to 5 mg as needed, Flexeril as needed, add Voltaren gel as needed  Continue pregabalin 25 mg every 12 hours for left upper extremity neuropathic pain  Continue Eliquis for atrial fibrillation, arterial vascular disease and coronary artery disease  Continue Lipitor for hyperlipidemia; plan to switch to Zocor when the patient's family bring patient's Zocor from

## 2024-05-29 NOTE — PLAN OF CARE
Problem: Discharge Planning  Goal: Discharge to home or other facility with appropriate resources  5/28/2024 2248 by Ricardo Cason LPN  Outcome: Progressing  Flowsheets (Taken 5/28/2024 2248)  Discharge to home or other facility with appropriate resources: Identify barriers to discharge with patient and caregiver     Problem: Chronic Conditions and Co-morbidities  Goal: Patient's chronic conditions and co-morbidity symptoms are monitored and maintained or improved  5/28/2024 2248 by Ricardo Cason LPN  Outcome: Progressing  Flowsheets (Taken 5/28/2024 2248)  Care Plan - Patient's Chronic Conditions and Co-Morbidity Symptoms are Monitored and Maintained or Improved:   Monitor and assess patient's chronic conditions and comorbid symptoms for stability, deterioration, or improvement   Collaborate with multidisciplinary team to address chronic and comorbid conditions and prevent exacerbation or deterioration     Problem: Pain  Goal: Verbalizes/displays adequate comfort level or baseline comfort level  5/28/2024 2248 by Ricardo Cason LPN  Outcome: Progressing  Flowsheets (Taken 5/28/2024 2248)  Verbalizes/displays adequate comfort level or baseline comfort level:   Encourage patient to monitor pain and request assistance   Assess pain using appropriate pain scale   Administer analgesics based on type and severity of pain and evaluate response   Implement non-pharmacological measures as appropriate and evaluate response     Problem: Safety - Adult  Goal: Free from fall injury  5/28/2024 2248 by Ricardo Cason LPN  Outcome: Progressing  Flowsheets (Taken 5/28/2024 2248)  Free From Fall Injury: Instruct family/caregiver on patient safety     Problem: Skin/Tissue Integrity - Adult  Goal: Skin integrity remains intact  5/28/2024 2248 by Ricardo Cason LPN  Outcome: Progressing  Flowsheets (Taken 5/28/2024 2247)  Skin Integrity Remains Intact: Monitor for areas of redness and/or skin breakdown

## 2024-05-30 LAB — GLUCOSE BLD STRIP.AUTO-MCNC: 190 MG/DL (ref 70–108)

## 2024-05-30 PROCEDURE — 82948 REAGENT STRIP/BLOOD GLUCOSE: CPT

## 2024-05-30 PROCEDURE — 6370000000 HC RX 637 (ALT 250 FOR IP): Performed by: FAMILY MEDICINE

## 2024-05-30 PROCEDURE — 97530 THERAPEUTIC ACTIVITIES: CPT

## 2024-05-30 PROCEDURE — 97110 THERAPEUTIC EXERCISES: CPT

## 2024-05-30 PROCEDURE — 99232 SBSQ HOSP IP/OBS MODERATE 35: CPT | Performed by: PHYSICAL MEDICINE & REHABILITATION

## 2024-05-30 PROCEDURE — 6370000000 HC RX 637 (ALT 250 FOR IP): Performed by: PHYSICAL MEDICINE & REHABILITATION

## 2024-05-30 PROCEDURE — 97535 SELF CARE MNGMENT TRAINING: CPT

## 2024-05-30 PROCEDURE — 1180000000 HC REHAB R&B

## 2024-05-30 RX ORDER — SIMVASTATIN 20 MG
20 TABLET ORAL NIGHTLY
Status: DISCONTINUED | OUTPATIENT
Start: 2024-05-30 | End: 2024-06-06 | Stop reason: HOSPADM

## 2024-05-30 RX ADMIN — Medication 1 TABLET: at 10:02

## 2024-05-30 RX ADMIN — SENNOSIDES 17.2 MG: 8.6 TABLET, FILM COATED ORAL at 21:59

## 2024-05-30 RX ADMIN — Medication 6 MG: at 21:58

## 2024-05-30 RX ADMIN — FUROSEMIDE 20 MG: 20 TABLET ORAL at 10:03

## 2024-05-30 RX ADMIN — POLYETHYLENE GLYCOL 3350 17 G: 17 POWDER, FOR SOLUTION ORAL at 13:05

## 2024-05-30 RX ADMIN — ACETAMINOPHEN 650 MG: 325 TABLET ORAL at 18:03

## 2024-05-30 RX ADMIN — DICLOFENAC SODIUM 2 G: 10 GEL TOPICAL at 10:05

## 2024-05-30 RX ADMIN — SACUBITRIL AND VALSARTAN 1 TABLET: 49; 51 TABLET, FILM COATED ORAL at 10:02

## 2024-05-30 RX ADMIN — DOCUSATE SODIUM 100 MG: 100 CAPSULE, LIQUID FILLED ORAL at 13:05

## 2024-05-30 RX ADMIN — Medication 1000 UNITS: at 10:02

## 2024-05-30 RX ADMIN — Medication 20 MG: at 22:03

## 2024-05-30 RX ADMIN — PREGABALIN 25 MG: 25 CAPSULE ORAL at 22:01

## 2024-05-30 RX ADMIN — ACETAMINOPHEN 650 MG: 325 TABLET ORAL at 21:59

## 2024-05-30 RX ADMIN — OXYCODONE 5 MG: 5 TABLET ORAL at 09:58

## 2024-05-30 RX ADMIN — METOPROLOL TARTRATE 12.5 MG: 25 TABLET, FILM COATED ORAL at 10:04

## 2024-05-30 RX ADMIN — PREGABALIN 25 MG: 25 CAPSULE ORAL at 10:01

## 2024-05-30 RX ADMIN — TRAZODONE HYDROCHLORIDE 50 MG: 50 TABLET ORAL at 21:58

## 2024-05-30 RX ADMIN — ISOSORBIDE MONONITRATE 30 MG: 30 TABLET, EXTENDED RELEASE ORAL at 10:03

## 2024-05-30 RX ADMIN — NALOXEGOL OXALATE 12.5 MG: 12.5 TABLET, FILM COATED ORAL at 05:53

## 2024-05-30 RX ADMIN — APIXABAN 5 MG: 5 TABLET, FILM COATED ORAL at 21:58

## 2024-05-30 RX ADMIN — ACETAMINOPHEN 650 MG: 325 TABLET ORAL at 10:01

## 2024-05-30 RX ADMIN — DOXAZOSIN 4 MG: 4 TABLET ORAL at 22:02

## 2024-05-30 RX ADMIN — MAGNESIUM HYDROXIDE 15 ML: 1200 LIQUID ORAL at 13:06

## 2024-05-30 RX ADMIN — BISACODYL 10 MG: 10 SUPPOSITORY RECTAL at 17:31

## 2024-05-30 RX ADMIN — APIXABAN 5 MG: 5 TABLET, FILM COATED ORAL at 10:01

## 2024-05-30 RX ADMIN — CALCIUM 500 MG: 500 TABLET ORAL at 18:03

## 2024-05-30 RX ADMIN — METOPROLOL TARTRATE 12.5 MG: 25 TABLET, FILM COATED ORAL at 22:02

## 2024-05-30 RX ADMIN — OXYCODONE 5 MG: 5 TABLET ORAL at 04:06

## 2024-05-30 RX ADMIN — CALCIUM 500 MG: 500 TABLET ORAL at 10:03

## 2024-05-30 RX ADMIN — DOCUSATE SODIUM 283 MG: 283 LIQUID RECTAL at 21:22

## 2024-05-30 RX ADMIN — PANTOPRAZOLE SODIUM 40 MG: 40 TABLET, DELAYED RELEASE ORAL at 13:06

## 2024-05-30 RX ADMIN — DOCUSATE SODIUM 100 MG: 100 CAPSULE, LIQUID FILLED ORAL at 21:59

## 2024-05-30 RX ADMIN — ASPIRIN 81 MG: 81 TABLET, COATED ORAL at 10:01

## 2024-05-30 RX ADMIN — CLOPIDOGREL BISULFATE 75 MG: 75 TABLET ORAL at 10:01

## 2024-05-30 ASSESSMENT — PAIN - FUNCTIONAL ASSESSMENT
PAIN_FUNCTIONAL_ASSESSMENT: ACTIVITIES ARE NOT PREVENTED
PAIN_FUNCTIONAL_ASSESSMENT: ACTIVITIES ARE NOT PREVENTED
PAIN_FUNCTIONAL_ASSESSMENT: PREVENTS OR INTERFERES SOME ACTIVE ACTIVITIES AND ADLS

## 2024-05-30 ASSESSMENT — PAIN DESCRIPTION - LOCATION
LOCATION: LEG
LOCATION: LEG

## 2024-05-30 ASSESSMENT — PAIN DESCRIPTION - ORIENTATION
ORIENTATION: RIGHT
ORIENTATION: RIGHT

## 2024-05-30 ASSESSMENT — PAIN SCALES - GENERAL
PAINLEVEL_OUTOF10: 7
PAINLEVEL_OUTOF10: 1
PAINLEVEL_OUTOF10: 3
PAINLEVEL_OUTOF10: 4
PAINLEVEL_OUTOF10: 1
PAINLEVEL_OUTOF10: 9

## 2024-05-30 ASSESSMENT — PAIN DESCRIPTION - DESCRIPTORS
DESCRIPTORS: STABBING
DESCRIPTORS: ACHING;DISCOMFORT

## 2024-05-30 ASSESSMENT — PAIN SCALES - WONG BAKER: WONGBAKER_NUMERICALRESPONSE: HURTS A LITTLE BIT

## 2024-05-30 NOTE — PROGRESS NOTES
Dr. Elan Wilson instead.  X-ray of right knee was done at the time and he was diagnosed of having severe right knee tricompartmental osteoarthritis.  He was advised to consider right knee replacement surgery.  Therefore the patient was admitted to [facility] on 5/20/2024.  He then underwent right total knee arthroplasty surgery by Dr. Elan Wilson on 5/20/2024 at [facility].  He was allowed to weightbearing as tolerated on right lower extremity postoperatively.  On 5/22/2024 the patient exhibited symptomatic orthostatic hypotension during the PT therapy session.  He was treated with IV fluid and holding parameter was placed on his antihypertensive medications.    The patient says he feels well.  He says he had better sleep last night.  He had bowel movement this morning but still feel needing to have another bowel movement.  He continues having sore pain sensation with water running feeling at the right leg.  His right knee feels tight and sore.  He said his right thigh also feels tight.  He continues having weakness at right lower extremity.  He says Voltaren gel application helped to reduce the left arm and left forearm aching pain.  He is not experiencing any burning pain sensation now.  He tolerated yesterday's intensive rehab treatment well.    The patient is projected to be ready for discharge on 6/7/2024.      Rehabilitation:  PT: Reviewed.   Bed Mobility:  Supine to Sit: Stand By Assistance from bed with bed rail, Moderate assistance from flat mat table.   Sit to Supine: Stand By Assistance     Transfers:  Sit to Stand:Contact Guard Assistance, with increased time for completion  Stand to Sit:Contact Guard Assistance     Ambulation:  Contact Guard Assistance  Distance: 5' x 2, 10' x 2  Surface: Level Tile  Device: Rolling Walker  Gait Deviations:  Forward Flexed Posture, Slow Bibi, Decreased Step Length on Left, Decreased Weight Shift Right,  muscle strength is improving very slowly.  He tolerated the intensive rehabilitation treatment well.  His function is improving very slowly.    The patient currently is projected to be ready for discharge on 6/7/2024.      Plan:  Continue intensive PT/OT/RT inpatient rehabilitation program at least 3 hours per day, 5 days per week in order to improve functional status prior to discharge.  Family education and training will be completed.  Equipment evaluations and recommendations will be completed as appropriate.       Rehabilitation nursing continue to be involved for bowel, bladder, skin, and pain management.  Nursing will also provide education and training to patient and family.    Prophylaxis:  DVT: Patient on Eliquis, JUANITA stocking, intermittent pneumatic compression device.  GI: Colace, Senokot, Dulcolax suppository as needed, milk of magnesium as needed, GlycoLax as needed.  Pain: Tylenol, Tylenol as needed, oxycodone 2.5 to 5 mg as needed, Flexeril as needed, add Voltaren gel as needed  Continue pregabalin 25 mg every 12 hours for left upper extremity neuropathic pain  Continue Eliquis for atrial fibrillation, arterial vascular disease and coronary artery disease  Continue Lipitor for hyperlipidemia; plan to switch to Zocor when the patient's family bring patient's Zocor from home to be used  Continue aspirin, Plavix for coronary artery disease and peripheral arterial vascular disease  Completed 5-day course of Keflex every 6 hours (end on 5/28/2024) for postoperative surgical infection prophylaxis  Continue Cardura, Lasix, Entresto, Lopressor for hypertension and congestive heart failure  Continue Imdur for coronary artery disease  Continue Protonix for gastric protection  Continue Amaryl (substituted with glipizide), Humalog insulin coverage for diabetes mellitus  Continue vitamins D supplement  Continue multivitamin daily for anemia  Continue melatonin, trazodone as needed for insomnia  Nutrition: Continue

## 2024-05-30 NOTE — PROGRESS NOTES
[facility]  Occupational Therapy  Daily Note  Time:   Time In: 1130  Time Out: 1230  Timed Code Treatment Minutes: 60 Minutes  Minutes: 60          Date: 2024  Patient Name: Jake Dwyer,   Gender: male      Room: [facility]07/007-A  MRN: [id number]  : 1935  (89 y.o.)  Referring Practitioner: Quintin Boudreaux MD  Diagnosis: s/p total knee arthroscopy  Additional Pertinent Hx: Jake Dwyer is a 89 y.o. 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 Elan Wilson on 2024.  He was allowed to weightbearing as tolerated on right lower extremity postoperatively.  On 2024 the patient exhibited symptomatic orthostatic hypotension during the PT therapy session.  He was treated with IV fluid and holding parameter was placed on his antihypertensive medications.    Restrictions/Precautions:  Restrictions/Precautions: Fall Risk, Up as Tolerated, Weight Bearing  Right Lower Extremity Weight Bearing: Weight Bearing As Tolerated  Position Activity  navigate HH distances using LRAD at SBA without additional cues for safe walker management or obstacle negotiation to improve indep with ADL access.  Short Term Goal 2: Pt will tolerate x3-4 min standing with 1-2 UE release at CGA to improve dynamic standing required for sinkside ADLs.  Short Term Goal 3: Pt will complete toileting routine with Minimal A and 0-1 cues for problem solving to improve indep with ADLs.  Short Term Goal 4: Pt will perform all ADL transfers simulating home set up with CGA to improve indep access to ADLs.  Short Term Goal 5: Pt will complete UB/LB dressing with minimal A utilziing LHAE as needed to improve indep with BADLs.  Long Term Goals  Time Frame for Long Term Goals : 2 weeks from evaluation  Long Term Goal 1: Pt will navigate HH distances using LRAD at Supervision without additional cues for safe walker management or obstacle negotiation to improve indep with ADL access.  Long Term Goal 2: Pt will tolerate x5-8 min standing with 1-2 UE release at supervision to improve dynamic standing required for sinkside IADLs.  Long Term Goal 3: Pt will perform in shower bathing routine simulating home set up with supervision to improve indep with ADL routines.  Long Term Goal 4: Pt will complete UB/LB dressing/bathing with supervision using LHAE as needed and 0-1 cues for technique to improve indep with ADLs.  Long Term Goal 5: Pt will perform light meal prep routine with Supervision to improve indep with IADLs.    Following session, patient left in safe position with all fall risk precautions in place.

## 2024-05-30 NOTE — PLAN OF CARE
Problem: Pain  Goal: Verbalizes/displays adequate comfort level or baseline comfort level  5/29/2024 2241 by Anny Keane, RN  Outcome: Progressing  Flowsheets (Taken 5/29/2024 2241)  Verbalizes/displays adequate comfort level or baseline comfort level:   Encourage patient to monitor pain and request assistance   Assess pain using appropriate pain scale     Problem: Safety - Adult  Goal: Free from fall injury  5/29/2024 2241 by Anny Keane, RN  Outcome: Progressing  Flowsheets (Taken 5/29/2024 2241)  Free From Fall Injury: Instruct family/caregiver on patient safety     Problem: ABCDS Injury Assessment  Goal: Absence of physical injury  5/29/2024 2241 by Anny Keane, RN  Outcome: Progressing  Flowsheets (Taken 5/29/2024 2241)  Absence of Physical Injury: Implement safety measures based on patient assessment

## 2024-05-30 NOTE — PROGRESS NOTES
[facility]  INPATIENT PHYSICAL THERAPY  [facility] - 8K-07/007-A  Daily Note    Time In: 0700  Time Out: 0800  Timed Code Treatment Minutes: 60 Minutes  Minutes: 60          Date: 2024  Patient Name: Jake Dwyer,  Gender:  male        MRN: [id number]  : 1935  (89 y.o.)  Referral Date : 24  Referring Practitioner: Quintin Boudreaux MD  Diagnosis: S/P total knee arthroplasty, right  Additional Pertinent Hx: Per H&P:  The patient says he began having intermittent right knee buckling/collapse and occasional mild right knee pain started about 1 year ago gradually.  The symptoms progressively worsen and causing abnormal gait and some walking difficulty.  Therefore he saw his family physician more than 1 months ago for the problem.  Lumbar spine abnormality was suspected at the time.  X-ray and later CT of the lumbar spine later was done and he was discovered to have lumbar spinal stenosis.  Initially his PCP wanted to refer him to see orthospine surgeon at [facility] ([facility]) but he requested to see orthopedic spine surgeon who he knew in [place] (? OrthoNeuro).  He later went for the scheduled visit with orthospine surgeon in [place] but was found to have right lower extremity \"bowing\" at the knee.  Therefore he saw orthopedic surgeon, Dr. Elan Wilson instead.  X-ray of right knee was done at the time and he was diagnosed of having severe right knee tricompartment osteoarthritis.  He was advised to consider right knee replacement surgery.  Therefore the patient was admitted to [facility] on 2024.  He then underwent right total knee arthroplasty surgery by Dr. Elan Wilson on 2024 at [facility].  He was allowed to weightbearing as tolerated on right lower extremity postoperatively.  On 2024 the patient exhibited symptomatic orthostatic hypotension during the PT  therapy session.  He was treated with IV fluid and holding parameter was placed on his antihypertensive medications.     Prior Level of Function:  Lives With: Spouse (wife has dementia)  Type of Home: House  Home Layout: Two level, Bed/Bath upstairs  Home Access: Stairs to enter with rails  Entrance Stairs - Number of Steps: 3 VIOLA  Entrance Stairs - Rails: Left  Home Equipment: Walker - 4-Wheeled, Walker - Rolling, Cane   Bathroom Shower/Tub: Walk-in shower  Bathroom Toilet: Handicap height  Bathroom Equipment: Shower chair    ADL Assistance: Independent  Homemaking Assistance: Independent  Homemaking Responsibilities: Yes  Ambulation Assistance: Independent  Transfer Assistance: Independent  Active : No  Additional Comments: Typically ambulates with 4WW. Has RW that stays upstairs. Uses cane to go up and down steps. Indep with ADls and most IADls. 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    Restrictions/Precautions:  Restrictions/Precautions: Fall Risk, Up as Tolerated, Weight Bearing  Right Lower Extremity Weight Bearing: Weight Bearing As Tolerated  Position Activity Restriction  Other position/activity restrictions: monitor orthostatics     SUBJECTIVE: Pt. Laying in his bed and pleasantly agrees to therapy session.  Pt. Motivated for therapy session. Pt. Reports light headedness at times when standing throughout session. Encouraged increased fluid intake throughout the day.     Pain: Not rated: R LE    Vitals:   Patient Vitals for the past 8 hrs:   BP Pulse Resp SpO2 O2 Device   05/30/24 0615 138/77 72 -- 98 % None (Room air)   05/30/24 0436 -- -- 17 -- --   05/30/24 0406 -- -- 17 -- --     *Vitals may reflect data entered into the flowsheet prior to or after PT session was completed.      OBJECTIVE:  Bed Mobility:  Supine to Sit: Stand By Assistance from bed with bed rail, Moderate assistance from flat mat table.   Sit to Supine: Stand By Assistance     Transfers:  Sit to

## 2024-05-30 NOTE — PROGRESS NOTES
[facility]  INPATIENT PHYSICAL THERAPY  [facility] - 8K-07/007-A  Daily Note    Time In: 1330  Time Out: 1400  Timed Code Treatment Minutes: 30 Minutes  Minutes: 30          Date: 2024  Patient Name: Jake Dwyer,  Gender:  male        MRN: [id number]  : 1935  (89 y.o.)  Referral Date : 24  Referring Practitioner: Quintin Boudreaux MD  Diagnosis: S/P total knee arthroplasty, right  Additional Pertinent Hx: Per H&P:  The patient says he began having intermittent right knee buckling/collapse and occasional mild right knee pain started about 1 year ago gradually.  The symptoms progressively worsen and causing abnormal gait and some walking difficulty.  Therefore he saw his family physician more than 1 months ago for the problem.  Lumbar spine abnormality was suspected at the time.  X-ray and later CT of the lumbar spine later was done and he was discovered to have lumbar spinal stenosis.  Initially his PCP wanted to refer him to see orthospine surgeon at [facility] ([facility]) but he requested to see orthopedic spine surgeon who he knew in [place] (? OrthoNeuro).  He later went for the scheduled visit with orthospine surgeon in [place] but was found to have right lower extremity \"bowing\" at the knee.  Therefore he saw orthopedic surgeon, Dr. Elan Wilson instead.  X-ray of right knee was done at the time and he was diagnosed of having severe right knee tricompartment osteoarthritis.  He was advised to consider right knee replacement surgery.  Therefore the patient was admitted to [facility] on 2024.  He then underwent right total knee arthroplasty surgery by Dr. Elan Wilson on 2024 at [facility].  He was allowed to weightbearing as tolerated on right lower extremity postoperatively.  On 2024 the patient exhibited symptomatic orthostatic hypotension during the PT  Functional Mobility, Reviewed Prior Education, Health Promotion and Wellness Education, Verbal Exercise Instruction    Goals:  Patient Goals : decrease pain  Short Term Goals  Time Frame for Short Term Goals: 1 week  Short Term Goal 1: Patient will complete sit < > stand with CGA to stand to ambulate with decreased difficulty.  Short Term Goal 2: Patient will complete supine < > sit with CGA, head of bed flat, no rails to transfer in and out of bed safely.  Short Term Goal 3: Patient will ambulate 50' with a RW and CGA to increase mobility and progress towards navigating home safely.  Short Term Goal 4: Patient will ascend/descend 1, 4\" step in the parallel bars with CGA to progress towards safe home entry.  Short Term Goal 5: Patient will increase right knee AROM to 90 degrees to reduce pain and increase ease with transfers.  Long Term Goals  Time Frame for Long Term Goals : 3 weeks  Long Term Goal 1: Patient will complete supine < > sit with modified independence to transfer in and out of bed safely.  Long Term Goal 2: Patient will complete sit < > stand with modified independence to stand to ambulate safely.  Long Term Goal 3: Patient will complete bed < > chair transfer with a RW with modified independence transfer surface to surface safely.  Long Term Goal 4: Patient will ambulate 150' with a RW with modified independence to navigate home safely.  Long Term Goal 5: Patient will ascend/descend 3 steps with 1 hand rail and SBA to access/leave home.  Additional Goals?: Yes  Long term goal 6: Patient complete a car transfer with modified independence to transfer in and out of vehicle safely.    Following session, patient left in safe position with all fall risk precautions in place.

## 2024-05-30 NOTE — PROGRESS NOTES
[facility]  Occupational Therapy  Daily Note  Time:   Time In: 0830  Time Out: 0900  Timed Code Treatment Minutes: 30 Minutes  Minutes: 30          Date: 2024  Patient Name: Jake Dwyer,   Gender: male      Room: [facility]07/007-A  MRN: [id number]  : 1935  (89 y.o.)  Referring Practitioner: Quintin Boudreaux MD  Diagnosis: s/p total knee arthroscopy  Additional Pertinent Hx: Jake Dwyer is a 89 y.o. 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 Elan Wilson on 2024.  He was allowed to weightbearing as tolerated on right lower extremity postoperatively.  On 2024 the patient exhibited symptomatic orthostatic hypotension during the PT therapy session.  He was treated with IV fluid and holding parameter was placed on his antihypertensive medications.    Restrictions/Precautions:  Restrictions/Precautions: Fall Risk, Up as Tolerated, Weight Bearing  Right Lower Extremity Weight Bearing: Weight Bearing As Tolerated  Position Activity  Restriction  Other position/activity restrictions: monitor orthostatics     Social/Functional History:  Lives With: Spouse (wife has dementia)  Type of Home: House  Home Layout: Two level, Bed/Bath upstairs  Home Access: Stairs to enter with rails  Entrance Stairs - Number of Steps: 3 VIOLA  Entrance Stairs - Rails: Left  Home Equipment: Walker - 4-Wheeled, Walker - Rolling, Cane   Bathroom Shower/Tub: Walk-in shower  Bathroom Toilet: Handicap height  Bathroom Equipment: Shower chair       ADL Assistance: Independent  Homemaking Assistance: Independent  Homemaking Responsibilities: Yes  Ambulation Assistance: Independent  Transfer Assistance: Independent    Active : No  Patient's  Info: [place]ibhors help with driving     Additional Comments: Typically ambulates with 4WW. Has RW that stays upstairs. Uses cane to go up and down steps. Indep with ADls and most IADls. 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    SUBJECTIVE: Pt sitting in bedside chair upon arrival. Pt pleasant and agreeable to OT session.    PAIN: c/o R knee pain     Vitals: Vitals not assessed per clinical judgement, see nursing flowsheet    COGNITION: WFL    ADL:   Grooming: Supervision and with set-up.  Pt c/o fatigue and requests to complete oral care seated this date. Pt requires A for set up and clean up assistance.   Upper Extremity Dressing: Supervision.  Seated to doff/don pull over shirt   Lower Extremity Dressing: Maximum Assistance.  Pt requires A to doff pants and depends over feet however is able to doff over hips. Pt also requires A to tread BLE and min A for hip mgmt in the back.  Footwear Management: Dependent.  To don/doff B slipper socks and TEDs.  Toileting: Minimal Assistance.  To don back of pants during clothing mgmt.  Toilet Transfer: Minimal Assistance, with verbal cues , and with increased time for completion.   .    IADL:   Not Tested    BALANCE:  Sitting Balance:  Supervision.    Standing

## 2024-05-30 NOTE — PLAN OF CARE
Problem: Discharge Planning  Goal: Discharge to home or other facility with appropriate resources  Outcome: Progressing     Problem: Chronic Conditions and Co-morbidities  Goal: Patient's chronic conditions and co-morbidity symptoms are monitored and maintained or improved  Outcome: Progressing     Problem: Pain  Goal: Verbalizes/displays adequate comfort level or baseline comfort level  Outcome: Progressing     Problem: Safety - Adult  Goal: Free from fall injury  Outcome: Progressing     Problem: Respiratory - Adult  Goal: Achieves optimal ventilation and oxygenation  Outcome: Progressing     Problem: Respiratory - Adult  Goal: Achieves optimal ventilation and oxygenation  Outcome: Progressing     Problem: Respiratory - Adult  Goal: Achieves optimal ventilation and oxygenation  Outcome: Progressing     Problem: Cardiovascular - Adult  Goal: Maintains optimal cardiac output and hemodynamic stability  Outcome: Progressing     Problem: Skin/Tissue Integrity - Adult  Goal: Skin integrity remains intact  Outcome: Progressing     Problem: Musculoskeletal - Adult  Goal: Return mobility to safest level of function  Outcome: Progressing     Problem: Nutrition Deficit:  Goal: Optimize nutritional status  Outcome: Progressing     Problem: Skin/Tissue Integrity  Goal: Absence of new skin breakdown  Description: 1.  Monitor for areas of redness and/or skin breakdown  2.  Assess vascular access sites hourly  3.  Every 4-6 hours minimum:  Change oxygen saturation probe site  4.  Every 4-6 hours:  If on nasal continuous positive airway pressure, respiratory therapy assess nares and determine need for appliance change or resting period.  Outcome: Progressing

## 2024-05-31 LAB — GLUCOSE BLD STRIP.AUTO-MCNC: 212 MG/DL (ref 70–108)

## 2024-05-31 PROCEDURE — 99232 SBSQ HOSP IP/OBS MODERATE 35: CPT | Performed by: PHYSICAL MEDICINE & REHABILITATION

## 2024-05-31 PROCEDURE — 97535 SELF CARE MNGMENT TRAINING: CPT

## 2024-05-31 PROCEDURE — 6370000000 HC RX 637 (ALT 250 FOR IP): Performed by: PHYSICAL MEDICINE & REHABILITATION

## 2024-05-31 PROCEDURE — 1180000000 HC REHAB R&B

## 2024-05-31 PROCEDURE — 97116 GAIT TRAINING THERAPY: CPT

## 2024-05-31 PROCEDURE — 97530 THERAPEUTIC ACTIVITIES: CPT

## 2024-05-31 PROCEDURE — 97110 THERAPEUTIC EXERCISES: CPT

## 2024-05-31 PROCEDURE — 82948 REAGENT STRIP/BLOOD GLUCOSE: CPT

## 2024-05-31 PROCEDURE — 6370000000 HC RX 637 (ALT 250 FOR IP): Performed by: FAMILY MEDICINE

## 2024-05-31 RX ORDER — METFORMIN HYDROCHLORIDE 500 MG/1
500 TABLET, EXTENDED RELEASE ORAL 2 TIMES DAILY WITH MEALS
Status: DISCONTINUED | OUTPATIENT
Start: 2024-05-31 | End: 2024-06-03

## 2024-05-31 RX ADMIN — METFORMIN HYDROCHLORIDE 500 MG: 500 TABLET, EXTENDED RELEASE ORAL at 17:47

## 2024-05-31 RX ADMIN — ASPIRIN 81 MG: 81 TABLET, COATED ORAL at 11:00

## 2024-05-31 RX ADMIN — Medication 1000 UNITS: at 10:57

## 2024-05-31 RX ADMIN — ISOSORBIDE MONONITRATE 30 MG: 30 TABLET, EXTENDED RELEASE ORAL at 12:30

## 2024-05-31 RX ADMIN — OXYCODONE 5 MG: 5 TABLET ORAL at 14:37

## 2024-05-31 RX ADMIN — APIXABAN 5 MG: 5 TABLET, FILM COATED ORAL at 10:57

## 2024-05-31 RX ADMIN — PREGABALIN 25 MG: 25 CAPSULE ORAL at 10:58

## 2024-05-31 RX ADMIN — OXYCODONE 5 MG: 5 TABLET ORAL at 07:24

## 2024-05-31 RX ADMIN — Medication 20 MG: at 20:21

## 2024-05-31 RX ADMIN — DICLOFENAC SODIUM 2 G: 10 GEL TOPICAL at 12:23

## 2024-05-31 RX ADMIN — ACETAMINOPHEN 650 MG: 325 TABLET ORAL at 17:47

## 2024-05-31 RX ADMIN — CALCIUM 500 MG: 500 TABLET ORAL at 17:47

## 2024-05-31 RX ADMIN — ACETAMINOPHEN 650 MG: 325 TABLET ORAL at 10:56

## 2024-05-31 RX ADMIN — ACETAMINOPHEN 650 MG: 325 TABLET ORAL at 20:14

## 2024-05-31 RX ADMIN — TRAZODONE HYDROCHLORIDE 50 MG: 50 TABLET ORAL at 20:14

## 2024-05-31 RX ADMIN — MAGNESIUM HYDROXIDE 15 ML: 1200 LIQUID ORAL at 11:01

## 2024-05-31 RX ADMIN — CYCLOBENZAPRINE 5 MG: 10 TABLET, FILM COATED ORAL at 12:27

## 2024-05-31 RX ADMIN — DOCUSATE SODIUM 100 MG: 100 CAPSULE, LIQUID FILLED ORAL at 11:06

## 2024-05-31 RX ADMIN — POLYETHYLENE GLYCOL 3350 17 G: 17 POWDER, FOR SOLUTION ORAL at 11:03

## 2024-05-31 RX ADMIN — NALOXEGOL OXALATE 12.5 MG: 12.5 TABLET, FILM COATED ORAL at 05:42

## 2024-05-31 RX ADMIN — DOCUSATE SODIUM 100 MG: 100 CAPSULE, LIQUID FILLED ORAL at 20:16

## 2024-05-31 RX ADMIN — APIXABAN 5 MG: 5 TABLET, FILM COATED ORAL at 20:15

## 2024-05-31 RX ADMIN — SENNOSIDES 17.2 MG: 8.6 TABLET, FILM COATED ORAL at 20:15

## 2024-05-31 RX ADMIN — CLOPIDOGREL BISULFATE 75 MG: 75 TABLET ORAL at 10:58

## 2024-05-31 RX ADMIN — CALCIUM 500 MG: 500 TABLET ORAL at 10:57

## 2024-05-31 RX ADMIN — METOPROLOL TARTRATE 12.5 MG: 25 TABLET, FILM COATED ORAL at 12:30

## 2024-05-31 RX ADMIN — Medication 6 MG: at 20:15

## 2024-05-31 RX ADMIN — PREGABALIN 25 MG: 25 CAPSULE ORAL at 20:14

## 2024-05-31 RX ADMIN — CYCLOBENZAPRINE 5 MG: 10 TABLET, FILM COATED ORAL at 20:15

## 2024-05-31 RX ADMIN — Medication 1 TABLET: at 10:57

## 2024-05-31 RX ADMIN — PANTOPRAZOLE SODIUM 40 MG: 40 TABLET, DELAYED RELEASE ORAL at 11:00

## 2024-05-31 RX ADMIN — SACUBITRIL AND VALSARTAN 1 TABLET: 49; 51 TABLET, FILM COATED ORAL at 12:30

## 2024-05-31 RX ADMIN — METFORMIN HYDROCHLORIDE 500 MG: 500 TABLET, EXTENDED RELEASE ORAL at 10:57

## 2024-05-31 ASSESSMENT — PAIN SCALES - WONG BAKER
WONGBAKER_NUMERICALRESPONSE: HURTS A LITTLE BIT
WONGBAKER_NUMERICALRESPONSE: NO HURT
WONGBAKER_NUMERICALRESPONSE: NO HURT
WONGBAKER_NUMERICALRESPONSE: HURTS A LITTLE BIT
WONGBAKER_NUMERICALRESPONSE: HURTS A LITTLE BIT

## 2024-05-31 ASSESSMENT — PAIN SCALES - GENERAL
PAINLEVEL_OUTOF10: 3
PAINLEVEL_OUTOF10: 6
PAINLEVEL_OUTOF10: 8
PAINLEVEL_OUTOF10: 0
PAINLEVEL_OUTOF10: 4
PAINLEVEL_OUTOF10: 10

## 2024-05-31 ASSESSMENT — PAIN DESCRIPTION - LOCATION
LOCATION: KNEE
LOCATION: KNEE
LOCATION: ARM

## 2024-05-31 ASSESSMENT — PAIN DESCRIPTION - DESCRIPTORS
DESCRIPTORS: DISCOMFORT
DESCRIPTORS: SHARP;THROBBING
DESCRIPTORS: SPASM

## 2024-05-31 ASSESSMENT — PAIN DESCRIPTION - ORIENTATION
ORIENTATION: LEFT
ORIENTATION: RIGHT
ORIENTATION: RIGHT;INNER
ORIENTATION: LEFT

## 2024-05-31 ASSESSMENT — PAIN - FUNCTIONAL ASSESSMENT
PAIN_FUNCTIONAL_ASSESSMENT: ACTIVITIES ARE NOT PREVENTED

## 2024-05-31 NOTE — PROGRESS NOTES
[facility]  Occupational Therapy  Daily Note  Time:   Time In: 0700  Time Out: 0830  Timed Code Treatment Minutes: 90 Minutes  Minutes: 90          Date: 2024  Patient Name: Jake Dwyer,   Gender: male      Room: [facility]07/007-A  MRN: [id number]  : 1935  (89 y.o.)  Referring Practitioner: Quintin Boudreaux MD  Diagnosis: s/p total knee arthroscopy  Additional Pertinent Hx: Jake Dwyer is a 89 y.o. 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 Elan Wilson on 2024.  He was allowed to weightbearing as tolerated on right lower extremity postoperatively.  On 2024 the patient exhibited symptomatic orthostatic hypotension during the PT therapy session.  He was treated with IV fluid and holding parameter was placed on his antihypertensive medications.    Restrictions/Precautions:  Restrictions/Precautions: Fall Risk, Up as Tolerated, Weight Bearing  Right Lower Extremity Weight Bearing: Weight Bearing As Tolerated  Position Activity

## 2024-05-31 NOTE — PROGRESS NOTES
RECREATIONAL THERAPY  [facility]      Date:  5/31/2024            Patient Name: Jake Dwyer           MRN: [id number]  Acct: [id number]          YOB: 1935 (89 y.o.)       Gender: male   Diagnosis: s/p total knee arthroscopy  Physician: Referring Practitioner: Quintin Boudreaux MD    REASON FOR MISSED TREATMENT:  Pt declined going outside to enjoy the fresh air and sunshine-states he needs to lie down in bed at this time     Katalina Erickson, CTRS    5/31/2024

## 2024-05-31 NOTE — PLAN OF CARE
Problem: Discharge Planning  Goal: Discharge to home or other facility with appropriate resources  Note: SW met with patient on this date to discuss discharge planning. SW educated patient on his options at discharge including home health vs outpatient therapy services. SW informed him of recommendation for home health. Patient reported understanding and is in agreement with plan for home health services. SAIMA educated patient on services provided with home health. Post-acute (PAC) provider list was provided to patient. Patient was informed of their freedom to choose PAC provider. Discussed and offered to show the patient the relevant PAC Providers quality and resource use measures on Medicare Compare web site via computer based on patient's goals of care and treatment preferences. Questions regarding selection process were answered. Patient is thinking of using [facility] at this time, but wants to discuss it with his wife first. Patient reported that he is unsure at this time who will be available at discharge. It will either be his neighbor or brother in law. Patient will talk with them if anyone wants to do family training. SW educated patient on senior services in [place] including [facility]. Patient is not interested at this time and feels his needs are being met with family and his neighbors. SW will follow and maintain involvement in discharge planning.

## 2024-05-31 NOTE — PROGRESS NOTES
[facility]  INPATIENT PHYSICAL THERAPY  [facility] - 8K-07/007-A  Daily Note    Time In: 1100  Time Out: 1200  Timed Code Treatment Minutes: 60 Minutes  Minutes: 60          Date: 2024  Patient Name: Jake Dwyer,  Gender:  male        MRN: [id number]  : 1935  (89 y.o.)  Referral Date : 24  Referring Practitioner: Quintin Boudreaux MD  Diagnosis: S/P total knee arthroplasty, right  Additional Pertinent Hx: Per H&P:  The patient says he began having intermittent right knee buckling/collapse and occasional mild right knee pain started about 1 year ago gradually.  The symptoms progressively worsen and causing abnormal gait and some walking difficulty.  Therefore he saw his family physician more than 1 months ago for the problem.  Lumbar spine abnormality was suspected at the time.  X-ray and later CT of the lumbar spine later was done and he was discovered to have lumbar spinal stenosis.  Initially his PCP wanted to refer him to see orthospine surgeon at [facility] ([facility]) but he requested to see orthopedic spine surgeon who he knew in [place] (? Orth[facility]).  He later went for the scheduled visit with orthospine surgeon in [place] but was found to have right lower extremity \"bowing\" at the knee.  Therefore he saw orthopedic surgeon, Dr. Elan Wilson instead.  X-ray of right knee was done at the time and he was diagnosed of having severe right knee tricompartment osteoarthritis.  He was advised to consider right knee replacement surgery.  Therefore the patient was admitted to [facility] on 2024.  He then underwent right total knee arthroplasty surgery by Dr. Elan Wilson on 2024 at [facility].  He was allowed to weightbearing as tolerated on right lower extremity postoperatively.  On 2024 the patient exhibited symptomatic orthostatic hypotension during the PT  procurement, Therapeutic activities  General Plan:  (5x/wk 90min)    Patient Education  Patient Education: Plan of Care, Functional Mobility, Reviewed Prior Education, Health Promotion and Wellness Education, Safety, Verbal Exercise Instruction    Goals:  Patient Goals : decrease pain  Short Term Goals  Time Frame for Short Term Goals: 1 week  Short Term Goal 1: Patient will complete sit < > stand with CGA to stand to ambulate with decreased difficulty.  Short Term Goal 2: Patient will complete supine < > sit with CGA, head of bed flat, no rails to transfer in and out of bed safely.  Short Term Goal 3: Patient will ambulate 50' with a RW and CGA to increase mobility and progress towards navigating home safely.  Short Term Goal 4: Patient will ascend/descend 1, 4\" step in the parallel bars with CGA to progress towards safe home entry.  Short Term Goal 5: Patient will increase right knee AROM to 90 degrees to reduce pain and increase ease with transfers.  Long Term Goals  Time Frame for Long Term Goals : 3 weeks  Long Term Goal 1: Patient will complete supine < > sit with modified independence to transfer in and out of bed safely.  Long Term Goal 2: Patient will complete sit < > stand with modified independence to stand to ambulate safely.  Long Term Goal 3: Patient will complete bed < > chair transfer with a RW with modified independence transfer surface to surface safely.  Long Term Goal 4: Patient will ambulate 150' with a RW with modified independence to navigate home safely.  Long Term Goal 5: Patient will ascend/descend 3 steps with 1 hand rail and SBA to access/leave home.  Additional Goals?: Yes  Long term goal 6: Patient complete a car transfer with modified independence to transfer in and out of vehicle safely.    Following session, patient left in safe position with all fall risk precautions in place.

## 2024-05-31 NOTE — PROGRESS NOTES
Patient: Jake Dwyer  Unit/Bed: 8K-07/007-A  YOB: 1935  MRN: [id number] Acct: [id number]   Admitting Diagnosis: History of total knee arthroplasty [Z96.659]  S/P total knee arthroplasty, right [Z96.651]  Admit Date:  5/25/2024  Hospital Day: 5    Assessment:     Principal Problem:    S/P total knee arthroplasty, right  Active Problems:    Debility    Orthostatic hypotension    Class 1 obesity in adult    Primary hypertension    Mixed hyperlipidemia    Controlled type 2 diabetes mellitus with hyperglycemia (HCC)    Right leg swelling    Chronic atrial fibrillation (HCC)    Macular degeneration of left eye    Vision loss, right eye    Obstructive sleep apnea  Resolved Problems:    * No resolved hospital problems. *      Plan:     We discussed the CS today being at 0905 but he states it was before he had ate. I will continue  to watch based on the normal A1c that was recently done.        Subjective:     Patient has no complaint of CP or SOB..   Medication side effects: none    Scheduled Meds:   simvastatin  20 mg Oral Nightly    magnesium hydroxide  15 mL Oral Daily    pregabalin  25 mg Oral Q12H    naloxegol  12.5 mg Oral QAM AC    polyethylene glycol  17 g Oral Daily    metoprolol tartrate  12.5 mg Oral Q12H    sacubitril-valsartan  1 tablet Oral Lunch    calcium elemental  500 mg Oral BID WC    apixaban  5 mg Oral BID    doxazosin  4 mg Oral Nightly    pantoprazole  40 mg Oral Daily    aspirin  81 mg Oral Daily    clopidogrel  75 mg Oral Daily    furosemide  20 mg Oral Daily    isosorbide mononitrate  30 mg Oral Daily    docusate sodium  100 mg Oral BID    senna  2 tablet Oral Nightly    acetaminophen  650 mg Oral Q6H    multivitamin  1 tablet Oral Daily    Vitamin D  1,000 Units Oral Daily    melatonin  6 mg Oral Nightly     Continuous Infusions:  PRN Meds:diclofenac sodium, docusate sodium, cyclobenzaprine, bisacodyl, magnesium hydroxide, acetaminophen, oxyCODONE **OR** oxyCODONE,

## 2024-05-31 NOTE — PROGRESS NOTES
Patient: Jake Dwyer  Unit/Bed: 8K-07/007-A  YOB: 1935  MRN: [id number] Acct: [id number]   Admitting Diagnosis: History of total knee arthroplasty [Z96.659]  S/P total knee arthroplasty, right [Z96.651]  Admit Date:  5/25/2024  Hospital Day: 6    Assessment:     Principal Problem:    S/P total knee arthroplasty, right  Active Problems:    Debility    Orthostatic hypotension    Class 1 obesity in adult    Primary hypertension    Mixed hyperlipidemia    Controlled type 2 diabetes mellitus with hyperglycemia (HCC)    Right leg swelling    Chronic atrial fibrillation (HCC)    Macular degeneration of left eye    Vision loss, right eye    Obstructive sleep apnea  Resolved Problems:    * No resolved hospital problems. *      Plan:     The fasting CS continues to be elevated so I will begin some metformin.        Subjective:     Patient has no complaint of CP or SOB..   Medication side effects: none    Scheduled Meds:   metFORMIN  500 mg Oral BID WC    simvastatin  20 mg Oral Nightly    magnesium hydroxide  15 mL Oral Daily    pregabalin  25 mg Oral Q12H    naloxegol  12.5 mg Oral QAM AC    polyethylene glycol  17 g Oral Daily    metoprolol tartrate  12.5 mg Oral Q12H    sacubitril-valsartan  1 tablet Oral Lunch    calcium elemental  500 mg Oral BID WC    apixaban  5 mg Oral BID    doxazosin  4 mg Oral Nightly    pantoprazole  40 mg Oral Daily    aspirin  81 mg Oral Daily    clopidogrel  75 mg Oral Daily    furosemide  20 mg Oral Daily    isosorbide mononitrate  30 mg Oral Daily    docusate sodium  100 mg Oral BID    senna  2 tablet Oral Nightly    acetaminophen  650 mg Oral Q6H    multivitamin  1 tablet Oral Daily    Vitamin D  1,000 Units Oral Daily    melatonin  6 mg Oral Nightly     Continuous Infusions:  PRN Meds:diclofenac sodium, docusate sodium, cyclobenzaprine, bisacodyl, magnesium hydroxide, acetaminophen, oxyCODONE **OR** oxyCODONE, ondansetron, aluminum & magnesium hydroxide-simethicone,

## 2024-05-31 NOTE — PROGRESS NOTES
Assistance  Distance: 40' x 2, 12' x 1  Surface: Level Tile  Device: Rolling Walker  Gait Deviations:  Forward Flexed Posture, Slow Bibi, Decreased Step Length Bilaterally, Decreased Weight Shift Right, Decreased Gait Speed, Decreased Foot Clearance Left, and Increased reliance on walker    Stairs:  None    Balance:  Dynamic Standing Balance: Stand By Assistance, Contact Guard Assistance while completing clothing management for toileting. Pt. Steady overall with no LOB.     Neuromuscular Re-education  None    Exercise:  Patient was guided in 1 set(s) 10 reps of exercises: Glut sets, Seated marches, Seated hamstring curls, Long arc quads, Seated abduction/adduction, and abdominal isometrics, Ankle pumps, Quad sets, Heelslides, Hip abduction/adduction and Straight leg raises.  Exercises were completed for increased independence with functional mobility.    Functional Outcome Measures:   Not completed  Modified Trey Scale:  Not Applicable     ASSESSMENT:  Assessment: Patient progressing toward established goals.  Activity Tolerance:  Patient tolerance of  treatment: good.     Equipment Recommendations:Equipment Needed: No  Discharge Recommendations: Continue to assess pending progress, Patient would benefit from continued therapy after discharge     PLAN: Current Treatment Recommendations: Strengthening, Balance training, Functional mobility training, Transfer training, Endurance training, Gait training, Stair training, Neuromuscular re-education, Home exercise program, Safety education & training, Patient/Caregiver education & training, Equipment evaluation, education, & procurement, Therapeutic activities  General Plan:  (5x/wk 90min)    Patient Education  Patient Education: Plan of Care, Functional Mobility, Reviewed Prior Education, Health Promotion and Wellness Education, Safety, Verbal Exercise Instruction    Goals:  Patient Goals : decrease pain  Short Term Goals  Time Frame for Short Term Goals: 1

## 2024-05-31 NOTE — PLAN OF CARE
Problem: Pain  Goal: Verbalizes/displays adequate comfort level or baseline comfort level  5/30/2024 2317 by Anny Keane, RN  Outcome: Progressing  Flowsheets (Taken 5/30/2024 2317)  Verbalizes/displays adequate comfort level or baseline comfort level:   Encourage patient to monitor pain and request assistance   Assess pain using appropriate pain scale     Problem: Safety - Adult  Goal: Free from fall injury  5/30/2024 2317 by Anny Keane, RN  Outcome: Progressing  Flowsheets (Taken 5/30/2024 2317)  Free From Fall Injury: Instruct family/caregiver on patient safety     Problem: ABCDS Injury Assessment  Goal: Absence of physical injury  5/30/2024 2317 by Anny Keane, RN  Outcome: Progressing

## 2024-06-01 LAB — GLUCOSE BLD STRIP.AUTO-MCNC: 158 MG/DL (ref 70–108)

## 2024-06-01 PROCEDURE — 82948 REAGENT STRIP/BLOOD GLUCOSE: CPT

## 2024-06-01 PROCEDURE — 6370000000 HC RX 637 (ALT 250 FOR IP): Performed by: PHYSICAL MEDICINE & REHABILITATION

## 2024-06-01 PROCEDURE — 1180000000 HC REHAB R&B

## 2024-06-01 PROCEDURE — 6370000000 HC RX 637 (ALT 250 FOR IP): Performed by: FAMILY MEDICINE

## 2024-06-01 RX ADMIN — ISOSORBIDE MONONITRATE 30 MG: 30 TABLET, EXTENDED RELEASE ORAL at 08:40

## 2024-06-01 RX ADMIN — METOPROLOL TARTRATE 12.5 MG: 25 TABLET, FILM COATED ORAL at 08:39

## 2024-06-01 RX ADMIN — Medication 1000 UNITS: at 08:38

## 2024-06-01 RX ADMIN — Medication 6 MG: at 22:07

## 2024-06-01 RX ADMIN — PANTOPRAZOLE SODIUM 40 MG: 40 TABLET, DELAYED RELEASE ORAL at 08:55

## 2024-06-01 RX ADMIN — POLYETHYLENE GLYCOL 3350 17 G: 17 POWDER, FOR SOLUTION ORAL at 08:55

## 2024-06-01 RX ADMIN — OXYCODONE 5 MG: 5 TABLET ORAL at 15:02

## 2024-06-01 RX ADMIN — CLOPIDOGREL BISULFATE 75 MG: 75 TABLET ORAL at 08:49

## 2024-06-01 RX ADMIN — DOXAZOSIN 4 MG: 4 TABLET ORAL at 22:06

## 2024-06-01 RX ADMIN — ACETAMINOPHEN 650 MG: 325 TABLET ORAL at 08:49

## 2024-06-01 RX ADMIN — Medication 20 MG: at 22:10

## 2024-06-01 RX ADMIN — APIXABAN 5 MG: 5 TABLET, FILM COATED ORAL at 22:07

## 2024-06-01 RX ADMIN — CALCIUM 500 MG: 500 TABLET ORAL at 22:06

## 2024-06-01 RX ADMIN — APIXABAN 5 MG: 5 TABLET, FILM COATED ORAL at 08:39

## 2024-06-01 RX ADMIN — DICLOFENAC SODIUM 2 G: 10 GEL TOPICAL at 22:24

## 2024-06-01 RX ADMIN — ACETAMINOPHEN 650 MG: 325 TABLET ORAL at 22:06

## 2024-06-01 RX ADMIN — ACETAMINOPHEN 650 MG: 325 TABLET ORAL at 15:05

## 2024-06-01 RX ADMIN — ASPIRIN 81 MG: 81 TABLET, COATED ORAL at 08:49

## 2024-06-01 RX ADMIN — MAGNESIUM HYDROXIDE 15 ML: 1200 LIQUID ORAL at 08:48

## 2024-06-01 RX ADMIN — Medication 1 TABLET: at 08:38

## 2024-06-01 RX ADMIN — DOCUSATE SODIUM 100 MG: 100 CAPSULE, LIQUID FILLED ORAL at 22:06

## 2024-06-01 RX ADMIN — DOCUSATE SODIUM 100 MG: 100 CAPSULE, LIQUID FILLED ORAL at 08:49

## 2024-06-01 RX ADMIN — METFORMIN HYDROCHLORIDE 500 MG: 500 TABLET, EXTENDED RELEASE ORAL at 22:06

## 2024-06-01 RX ADMIN — METOPROLOL TARTRATE 12.5 MG: 25 TABLET, FILM COATED ORAL at 22:08

## 2024-06-01 RX ADMIN — SACUBITRIL AND VALSARTAN 1 TABLET: 49; 51 TABLET, FILM COATED ORAL at 15:13

## 2024-06-01 RX ADMIN — METFORMIN HYDROCHLORIDE 500 MG: 500 TABLET, EXTENDED RELEASE ORAL at 08:40

## 2024-06-01 RX ADMIN — OXYCODONE 5 MG: 5 TABLET ORAL at 06:10

## 2024-06-01 RX ADMIN — NALOXEGOL OXALATE 12.5 MG: 12.5 TABLET, FILM COATED ORAL at 06:10

## 2024-06-01 RX ADMIN — SENNOSIDES 17.2 MG: 8.6 TABLET, FILM COATED ORAL at 22:06

## 2024-06-01 RX ADMIN — CALCIUM 500 MG: 500 TABLET ORAL at 08:38

## 2024-06-01 RX ADMIN — FUROSEMIDE 20 MG: 20 TABLET ORAL at 08:38

## 2024-06-01 RX ADMIN — PREGABALIN 25 MG: 25 CAPSULE ORAL at 22:06

## 2024-06-01 RX ADMIN — PREGABALIN 25 MG: 25 CAPSULE ORAL at 08:49

## 2024-06-01 RX ADMIN — CYCLOBENZAPRINE 5 MG: 10 TABLET, FILM COATED ORAL at 15:02

## 2024-06-01 ASSESSMENT — PAIN - FUNCTIONAL ASSESSMENT
PAIN_FUNCTIONAL_ASSESSMENT: ACTIVITIES ARE NOT PREVENTED
PAIN_FUNCTIONAL_ASSESSMENT: PREVENTS OR INTERFERES SOME ACTIVE ACTIVITIES AND ADLS

## 2024-06-01 ASSESSMENT — PAIN SCALES - WONG BAKER
WONGBAKER_NUMERICALRESPONSE: HURTS A LITTLE BIT

## 2024-06-01 ASSESSMENT — PAIN DESCRIPTION - ORIENTATION
ORIENTATION: LEFT
ORIENTATION: RIGHT;LEFT
ORIENTATION: RIGHT

## 2024-06-01 ASSESSMENT — PAIN DESCRIPTION - DESCRIPTORS
DESCRIPTORS: ACHING;BURNING
DESCRIPTORS: BURNING
DESCRIPTORS: SORE
DESCRIPTORS: ACHING

## 2024-06-01 ASSESSMENT — PAIN SCALES - GENERAL
PAINLEVEL_OUTOF10: 8
PAINLEVEL_OUTOF10: 4
PAINLEVEL_OUTOF10: 5
PAINLEVEL_OUTOF10: 9
PAINLEVEL_OUTOF10: 4

## 2024-06-01 ASSESSMENT — PAIN DESCRIPTION - LOCATION
LOCATION: LEG
LOCATION: ARM
LOCATION: LEG
LOCATION: KNEE
LOCATION: LEG

## 2024-06-01 ASSESSMENT — PAIN DESCRIPTION - FREQUENCY: FREQUENCY: INTERMITTENT

## 2024-06-01 ASSESSMENT — PAIN DESCRIPTION - PAIN TYPE: TYPE: SURGICAL PAIN

## 2024-06-01 ASSESSMENT — PAIN DESCRIPTION - ONSET: ONSET: ON-GOING

## 2024-06-01 NOTE — PLAN OF CARE
Problem: Discharge Planning  Goal: Discharge to home or other facility with appropriate resources  5/31/2024 2025 by Shaylee Goddard RN  Outcome: Progressing  Flowsheets (Taken 5/31/2024 2022)  Discharge to home or other facility with appropriate resources: Identify barriers to discharge with patient and caregiver     Problem: Chronic Conditions and Co-morbidities  Goal: Patient's chronic conditions and co-morbidity symptoms are monitored and maintained or improved  Outcome: Progressing  Flowsheets (Taken 5/31/2024 2022)  Care Plan - Patient's Chronic Conditions and Co-Morbidity Symptoms are Monitored and Maintained or Improved: Monitor and assess patient's chronic conditions and comorbid symptoms for stability, deterioration, or improvement     Problem: Pain  Goal: Verbalizes/displays adequate comfort level or baseline comfort level  Outcome: Progressing  Flowsheets (Taken 5/31/2024 2005)  Verbalizes/displays adequate comfort level or baseline comfort level: Encourage patient to monitor pain and request assistance     Problem: Safety - Adult  Goal: Free from fall injury  Outcome: Progressing

## 2024-06-02 LAB — GLUCOSE BLD STRIP.AUTO-MCNC: 171 MG/DL (ref 70–108)

## 2024-06-02 PROCEDURE — 6370000000 HC RX 637 (ALT 250 FOR IP): Performed by: FAMILY MEDICINE

## 2024-06-02 PROCEDURE — 97530 THERAPEUTIC ACTIVITIES: CPT

## 2024-06-02 PROCEDURE — 6370000000 HC RX 637 (ALT 250 FOR IP): Performed by: PHYSICAL MEDICINE & REHABILITATION

## 2024-06-02 PROCEDURE — 1180000000 HC REHAB R&B

## 2024-06-02 PROCEDURE — 97110 THERAPEUTIC EXERCISES: CPT

## 2024-06-02 PROCEDURE — 82948 REAGENT STRIP/BLOOD GLUCOSE: CPT

## 2024-06-02 RX ADMIN — PREGABALIN 25 MG: 25 CAPSULE ORAL at 08:11

## 2024-06-02 RX ADMIN — ASPIRIN 81 MG: 81 TABLET, COATED ORAL at 08:10

## 2024-06-02 RX ADMIN — APIXABAN 5 MG: 5 TABLET, FILM COATED ORAL at 20:39

## 2024-06-02 RX ADMIN — METFORMIN HYDROCHLORIDE 500 MG: 500 TABLET, EXTENDED RELEASE ORAL at 08:10

## 2024-06-02 RX ADMIN — FUROSEMIDE 20 MG: 20 TABLET ORAL at 08:12

## 2024-06-02 RX ADMIN — Medication 1000 UNITS: at 08:10

## 2024-06-02 RX ADMIN — PANTOPRAZOLE SODIUM 40 MG: 40 TABLET, DELAYED RELEASE ORAL at 08:10

## 2024-06-02 RX ADMIN — Medication 6 MG: at 20:39

## 2024-06-02 RX ADMIN — POLYETHYLENE GLYCOL 3350 17 G: 17 POWDER, FOR SOLUTION ORAL at 08:11

## 2024-06-02 RX ADMIN — ISOSORBIDE MONONITRATE 30 MG: 30 TABLET, EXTENDED RELEASE ORAL at 08:13

## 2024-06-02 RX ADMIN — DOCUSATE SODIUM 283 MG: 283 LIQUID RECTAL at 18:13

## 2024-06-02 RX ADMIN — METOPROLOL TARTRATE 12.5 MG: 25 TABLET, FILM COATED ORAL at 20:38

## 2024-06-02 RX ADMIN — CALCIUM 500 MG: 500 TABLET ORAL at 08:10

## 2024-06-02 RX ADMIN — DOXAZOSIN 4 MG: 4 TABLET ORAL at 20:39

## 2024-06-02 RX ADMIN — ACETAMINOPHEN 650 MG: 325 TABLET ORAL at 20:39

## 2024-06-02 RX ADMIN — SENNOSIDES 17.2 MG: 8.6 TABLET, FILM COATED ORAL at 20:44

## 2024-06-02 RX ADMIN — TRAZODONE HYDROCHLORIDE 50 MG: 50 TABLET ORAL at 20:39

## 2024-06-02 RX ADMIN — MAGNESIUM HYDROXIDE 15 ML: 1200 LIQUID ORAL at 08:14

## 2024-06-02 RX ADMIN — CYCLOBENZAPRINE 5 MG: 10 TABLET, FILM COATED ORAL at 20:40

## 2024-06-02 RX ADMIN — SACUBITRIL AND VALSARTAN 1 TABLET: 49; 51 TABLET, FILM COATED ORAL at 20:41

## 2024-06-02 RX ADMIN — DOCUSATE SODIUM 100 MG: 100 CAPSULE, LIQUID FILLED ORAL at 20:44

## 2024-06-02 RX ADMIN — METOPROLOL TARTRATE 12.5 MG: 25 TABLET, FILM COATED ORAL at 08:13

## 2024-06-02 RX ADMIN — ACETAMINOPHEN 650 MG: 325 TABLET ORAL at 13:33

## 2024-06-02 RX ADMIN — Medication 1 TABLET: at 08:10

## 2024-06-02 RX ADMIN — PREGABALIN 25 MG: 25 CAPSULE ORAL at 20:40

## 2024-06-02 RX ADMIN — CALCIUM 500 MG: 500 TABLET ORAL at 15:32

## 2024-06-02 RX ADMIN — Medication 20 MG: at 20:43

## 2024-06-02 RX ADMIN — ACETAMINOPHEN 650 MG: 325 TABLET ORAL at 07:41

## 2024-06-02 RX ADMIN — APIXABAN 5 MG: 5 TABLET, FILM COATED ORAL at 08:10

## 2024-06-02 RX ADMIN — DOCUSATE SODIUM 100 MG: 100 CAPSULE, LIQUID FILLED ORAL at 08:10

## 2024-06-02 RX ADMIN — METFORMIN HYDROCHLORIDE 500 MG: 500 TABLET, EXTENDED RELEASE ORAL at 15:32

## 2024-06-02 RX ADMIN — CYCLOBENZAPRINE 5 MG: 10 TABLET, FILM COATED ORAL at 13:32

## 2024-06-02 RX ADMIN — NALOXEGOL OXALATE 12.5 MG: 12.5 TABLET, FILM COATED ORAL at 08:10

## 2024-06-02 RX ADMIN — SACUBITRIL AND VALSARTAN 1 TABLET: 49; 51 TABLET, FILM COATED ORAL at 08:13

## 2024-06-02 RX ADMIN — CLOPIDOGREL BISULFATE 75 MG: 75 TABLET ORAL at 08:11

## 2024-06-02 ASSESSMENT — PAIN DESCRIPTION - DESCRIPTORS
DESCRIPTORS: ACHING
DESCRIPTORS: ACHING

## 2024-06-02 ASSESSMENT — PAIN DESCRIPTION - LOCATION
LOCATION: OTHER (COMMENT)
LOCATION: KNEE

## 2024-06-02 ASSESSMENT — PAIN DESCRIPTION - ORIENTATION
ORIENTATION: RIGHT
ORIENTATION: RIGHT

## 2024-06-02 ASSESSMENT — PAIN SCALES - WONG BAKER
WONGBAKER_NUMERICALRESPONSE: NO HURT

## 2024-06-02 ASSESSMENT — PAIN SCALES - GENERAL
PAINLEVEL_OUTOF10: 1
PAINLEVEL_OUTOF10: 0
PAINLEVEL_OUTOF10: 1
PAINLEVEL_OUTOF10: 2

## 2024-06-02 NOTE — PLAN OF CARE
Problem: Discharge Planning  Goal: Discharge to home or other facility with appropriate resources  6/2/2024 1418 by Malinda Danielle RN  Outcome: Progressing  Flowsheets (Taken 6/2/2024 1418)  Discharge to home or other facility with appropriate resources: Identify barriers to discharge with patient and caregiver     Problem: Chronic Conditions and Co-morbidities  Goal: Patient's chronic conditions and co-morbidity symptoms are monitored and maintained or improved  6/2/2024 1418 by Malinda Danielle RN  Outcome: Progressing  Flowsheets (Taken 6/2/2024 1418)  Care Plan - Patient's Chronic Conditions and Co-Morbidity Symptoms are Monitored and Maintained or Improved: Monitor and assess patient's chronic conditions and comorbid symptoms for stability, deterioration, or improvement     Problem: Pain  Goal: Verbalizes/displays adequate comfort level or baseline comfort level  6/2/2024 1418 by Malinda Danielle RN  Outcome: Progressing  Flowsheets (Taken 6/2/2024 1418)  Verbalizes/displays adequate comfort level or baseline comfort level:   Encourage patient to monitor pain and request assistance   Assess pain using appropriate pain scale     Problem: ABCDS Injury Assessment  Goal: Absence of physical injury  6/2/2024 1418 by Malinda Danielle RN  Outcome: Progressing  Flowsheets (Taken 6/2/2024 1418)  Absence of Physical Injury: Implement safety measures based on patient assessment     Problem: Skin/Tissue Integrity - Adult  Goal: Skin integrity remains intact  6/2/2024 1418 by Malinda Danielle RN  Outcome: Progressing  Flowsheets (Taken 6/2/2024 1418)  Skin Integrity Remains Intact: Monitor for areas of redness and/or skin breakdown     Problem: Skin/Tissue Integrity - Adult  Goal: Incisions, wounds, or drain sites healing without S/S of infection  6/2/2024 1418 by Malinda Danielle RN  Outcome: Progressing  Flowsheets (Taken 6/2/2024 1418)  Incisions, Wounds, or Drain Sites Healing Without Sign and Symptoms

## 2024-06-02 NOTE — PLAN OF CARE
Problem: Discharge Planning  Goal: Discharge to home or other facility with appropriate resources  Outcome: Progressing  Flowsheets (Taken 6/2/2024 0606)  Discharge to home or other facility with appropriate resources:   Identify barriers to discharge with patient and caregiver   Identify discharge learning needs (meds, wound care, etc)     Problem: Chronic Conditions and Co-morbidities  Goal: Patient's chronic conditions and co-morbidity symptoms are monitored and maintained or improved  Outcome: Progressing  Flowsheets (Taken 6/2/2024 0606)  Care Plan - Patient's Chronic Conditions and Co-Morbidity Symptoms are Monitored and Maintained or Improved:   Monitor and assess patient's chronic conditions and comorbid symptoms for stability, deterioration, or improvement   Collaborate with multidisciplinary team to address chronic and comorbid conditions and prevent exacerbation or deterioration     Problem: Pain  Goal: Verbalizes/displays adequate comfort level or baseline comfort level  Outcome: Progressing  Flowsheets (Taken 6/2/2024 0606)  Verbalizes/displays adequate comfort level or baseline comfort level:   Encourage patient to monitor pain and request assistance   Implement non-pharmacological measures as appropriate and evaluate response   Administer analgesics based on type and severity of pain and evaluate response   Assess pain using appropriate pain scale     Problem: Cardiovascular - Adult  Goal: Maintains optimal cardiac output and hemodynamic stability  Outcome: Progressing     Problem: Skin/Tissue Integrity - Adult  Goal: Skin integrity remains intact  Outcome: Progressing     Problem: Musculoskeletal - Adult  Goal: Return mobility to safest level of function  Outcome: Progressing     Problem: Skin/Tissue Integrity  Goal: Absence of new skin breakdown  Description: 1.  Monitor for areas of redness and/or skin breakdown  2.  Assess vascular access sites hourly  3.  Every 4-6 hours minimum:  Change oxygen  saturation probe site  4.  Every 4-6 hours:  If on nasal continuous positive airway pressure, respiratory therapy assess nares and determine need for appliance change or resting period.  Outcome: Progressing

## 2024-06-02 NOTE — CARE COORDINATION
Right leg edematous. Compression stockings in place and right leg elevated whenever possible with ice. C/O stiffness in the morning that pt states eventually wears off throughout the morning. Highly concerned about bowels. Scheduled and PRN bowel medication on MAR. Bowel sounds active and passing gas. Pt had large and small bowel movement yesterday. Per surgeon discharge instructions, dressing was changed 1 week post op with orders to maintain new dressing until follow up     1849 PRN enemeez suppository per order and pt request with results. No stool noted at rectum prior to admin.

## 2024-06-02 NOTE — PROGRESS NOTES
[facility]  Occupational Therapy  Daily Note  Time:   Time In: 0730  Time Out: 0800  Timed Code Treatment Minutes: 30 Minutes  Minutes: 30          Date: 2024  Patient Name: Jake Dwyer,   Gender: male      Room: [facility]07/007-A  MRN: [id number]  : 1935  (89 y.o.)  Referring Practitioner: Quintin Boudreaux MD  Diagnosis: s/p total knee arthroscopy  Additional Pertinent Hx: Jake Dwyer is a 89 y.o. 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 Elan Wilson on 2024.  He was allowed to weightbearing as tolerated on right lower extremity postoperatively.  On 2024 the patient exhibited symptomatic orthostatic hypotension during the PT therapy session.  He was treated with IV fluid and holding parameter was placed on his antihypertensive medications.    Restrictions/Precautions:  Restrictions/Precautions: Fall Risk, Up as Tolerated, Weight Bearing  Right Lower Extremity Weight Bearing: Weight Bearing As Tolerated  Position Activity  Restriction  Other position/activity restrictions: monitor orthostatics     Social/Functional History:  Lives With: Spouse (wife has dementia)  Type of Home: House  Home Layout: Two level, Bed/Bath upstairs  Home Access: Stairs to enter with rails  Entrance Stairs - Number of Steps: 3 VIOLA  Entrance Stairs - Rails: Left  Home Equipment: Walker - 4-Wheeled, Walker - Rolling, Cane   Bathroom Shower/Tub: Walk-in shower  Bathroom Toilet: Handicap height  Bathroom Equipment: Shower chair       ADL Assistance: Independent  Homemaking Assistance: Independent  Homemaking Responsibilities: Yes  Ambulation Assistance: Independent  Transfer Assistance: Independent    Active : No  Patient's  Info: Neibhors help with driving     Additional Comments: Typically ambulates with 4WW. Has RW that stays upstairs. Uses cane to go up and down steps. Indep with ADls and most IADls. 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    SUBJECTIVE: Patient using bathroom upon arrival. Agreeable to OT session     PAIN: Complains of pain on R shin (tender)     Vitals: Vitals not assessed per clinical judgement, see nursing flowsheet    COGNITION: WFL    ADL:   Upper Extremity Dressing: with set-up.  To don shirt seated on RTS  Toileting: Stand By Assistance.  For clothing management  Toilet Transfer: Stand By Assistance. From RTS .    IADL:   Not Tested    BALANCE:  Sitting Balance:  Modified Independent.    Standing Balance: Stand By Assistance.      BED MOBILITY:  Not Tested    TRANSFERS:  Sit to Stand:  Contact Guard Assistance. From various surfaces with increased time  Stand to Sit: Contact Guard Assistance. To various surfaces with increased time    FUNCTIONAL MOBILITY:  Assistive Device: Rolling Walker  Assist Level:  Contact Guard Assistance.   Distance:  From bathroom and additional 60 ft x1 trial, 20 ft x1 trial  Slow pace, no LOB noted. Seated rest break required after each trial of mobility with fatigue

## 2024-06-02 NOTE — PROGRESS NOTES
[facility]  INPATIENT PHYSICAL THERAPY  [facility] - 8K-07/007-A  Daily Note    Time In: 0900  Time Out: 0930  Timed Code Treatment Minutes: 30 Minutes  Minutes: 30          Date: 2024  Patient Name: Jake Dwyer,  Gender:  male        MRN: [id number]  : 1935  (89 y.o.)  Referral Date : 24  Referring Practitioner: Quintin Boudreaux MD  Diagnosis: S/P total knee arthroplasty, right  Additional Pertinent Hx: Per H&P:  The patient says he began having intermittent right knee buckling/collapse and occasional mild right knee pain started about 1 year ago gradually.  The symptoms progressively worsen and causing abnormal gait and some walking difficulty.  Therefore he saw his family physician more than 1 months ago for the problem.  Lumbar spine abnormality was suspected at the time.  X-ray and later CT of the lumbar spine later was done and he was discovered to have lumbar spinal stenosis.  Initially his PCP wanted to refer him to see orthospine surgeon at [facility] ([facility]) but he requested to see orthopedic spine surgeon who he knew in [place] (? OrthoNeuro).  He later went for the scheduled visit with orthospine surgeon in [place] but was found to have right lower extremity \"bowing\" at the knee.  Therefore he saw orthopedic surgeon, Dr. Elan Wilson instead.  X-ray of right knee was done at the time and he was diagnosed of having severe right knee tricompartment osteoarthritis.  He was advised to consider right knee replacement surgery.  Therefore the patient was admitted to [facility] on 2024.  He then underwent right total knee arthroplasty surgery by Dr. Elan Wilson on 2024 at [facility].  He was allowed to weightbearing as tolerated on right lower extremity postoperatively.  On 2024 the patient exhibited symptomatic orthostatic hypotension during the PT

## 2024-06-03 LAB — GLUCOSE BLD STRIP.AUTO-MCNC: 161 MG/DL (ref 70–108)

## 2024-06-03 PROCEDURE — 97110 THERAPEUTIC EXERCISES: CPT

## 2024-06-03 PROCEDURE — 1180000000 HC REHAB R&B

## 2024-06-03 PROCEDURE — 97116 GAIT TRAINING THERAPY: CPT

## 2024-06-03 PROCEDURE — 6370000000 HC RX 637 (ALT 250 FOR IP): Performed by: PHYSICAL MEDICINE & REHABILITATION

## 2024-06-03 PROCEDURE — 6370000000 HC RX 637 (ALT 250 FOR IP): Performed by: NURSE PRACTITIONER

## 2024-06-03 PROCEDURE — 97535 SELF CARE MNGMENT TRAINING: CPT

## 2024-06-03 PROCEDURE — 6370000000 HC RX 637 (ALT 250 FOR IP): Performed by: FAMILY MEDICINE

## 2024-06-03 PROCEDURE — 82948 REAGENT STRIP/BLOOD GLUCOSE: CPT

## 2024-06-03 PROCEDURE — 97530 THERAPEUTIC ACTIVITIES: CPT

## 2024-06-03 PROCEDURE — 99232 SBSQ HOSP IP/OBS MODERATE 35: CPT | Performed by: NURSE PRACTITIONER

## 2024-06-03 RX ORDER — METFORMIN HYDROCHLORIDE 500 MG/1
500 TABLET, EXTENDED RELEASE ORAL
Status: DISCONTINUED | OUTPATIENT
Start: 2024-06-04 | End: 2024-06-06 | Stop reason: HOSPADM

## 2024-06-03 RX ORDER — DOCUSATE SODIUM 100 MG/1
100 CAPSULE, LIQUID FILLED ORAL 3 TIMES DAILY
Status: DISCONTINUED | OUTPATIENT
Start: 2024-06-03 | End: 2024-06-06 | Stop reason: HOSPADM

## 2024-06-03 RX ORDER — METFORMIN HYDROCHLORIDE 500 MG/1
1000 TABLET, EXTENDED RELEASE ORAL
Status: DISCONTINUED | OUTPATIENT
Start: 2024-06-04 | End: 2024-06-06 | Stop reason: HOSPADM

## 2024-06-03 RX ADMIN — DOCUSATE SODIUM 100 MG: 100 CAPSULE, LIQUID FILLED ORAL at 21:06

## 2024-06-03 RX ADMIN — SACUBITRIL AND VALSARTAN 1 TABLET: 49; 51 TABLET, FILM COATED ORAL at 21:06

## 2024-06-03 RX ADMIN — SACUBITRIL AND VALSARTAN 1 TABLET: 49; 51 TABLET, FILM COATED ORAL at 08:50

## 2024-06-03 RX ADMIN — MAGNESIUM HYDROXIDE 15 ML: 1200 LIQUID ORAL at 08:48

## 2024-06-03 RX ADMIN — ACETAMINOPHEN 650 MG: 325 TABLET ORAL at 09:00

## 2024-06-03 RX ADMIN — DOCUSATE SODIUM 100 MG: 100 CAPSULE, LIQUID FILLED ORAL at 15:27

## 2024-06-03 RX ADMIN — METFORMIN HYDROCHLORIDE 500 MG: 500 TABLET, EXTENDED RELEASE ORAL at 16:51

## 2024-06-03 RX ADMIN — Medication 1 TABLET: at 08:50

## 2024-06-03 RX ADMIN — ACETAMINOPHEN 650 MG: 325 TABLET ORAL at 15:28

## 2024-06-03 RX ADMIN — DOXAZOSIN 4 MG: 4 TABLET ORAL at 21:06

## 2024-06-03 RX ADMIN — METOPROLOL TARTRATE 12.5 MG: 25 TABLET, FILM COATED ORAL at 08:51

## 2024-06-03 RX ADMIN — PREGABALIN 25 MG: 25 CAPSULE ORAL at 08:49

## 2024-06-03 RX ADMIN — OXYCODONE 5 MG: 5 TABLET ORAL at 12:45

## 2024-06-03 RX ADMIN — Medication 1000 UNITS: at 08:50

## 2024-06-03 RX ADMIN — FUROSEMIDE 20 MG: 20 TABLET ORAL at 08:50

## 2024-06-03 RX ADMIN — APIXABAN 5 MG: 5 TABLET, FILM COATED ORAL at 08:51

## 2024-06-03 RX ADMIN — DOCUSATE SODIUM 100 MG: 100 CAPSULE, LIQUID FILLED ORAL at 08:49

## 2024-06-03 RX ADMIN — OXYCODONE 5 MG: 5 TABLET ORAL at 04:14

## 2024-06-03 RX ADMIN — METOPROLOL TARTRATE 12.5 MG: 25 TABLET, FILM COATED ORAL at 21:07

## 2024-06-03 RX ADMIN — POLYETHYLENE GLYCOL 3350 17 G: 17 POWDER, FOR SOLUTION ORAL at 08:48

## 2024-06-03 RX ADMIN — NALOXEGOL OXALATE 12.5 MG: 12.5 TABLET, FILM COATED ORAL at 05:12

## 2024-06-03 RX ADMIN — METFORMIN HYDROCHLORIDE 500 MG: 500 TABLET, EXTENDED RELEASE ORAL at 08:51

## 2024-06-03 RX ADMIN — PREGABALIN 25 MG: 25 CAPSULE ORAL at 21:06

## 2024-06-03 RX ADMIN — CALCIUM 500 MG: 500 TABLET ORAL at 08:50

## 2024-06-03 RX ADMIN — APIXABAN 5 MG: 5 TABLET, FILM COATED ORAL at 21:07

## 2024-06-03 RX ADMIN — ISOSORBIDE MONONITRATE 30 MG: 30 TABLET, EXTENDED RELEASE ORAL at 08:50

## 2024-06-03 RX ADMIN — Medication 6 MG: at 21:07

## 2024-06-03 RX ADMIN — ASPIRIN 81 MG: 81 TABLET, COATED ORAL at 08:49

## 2024-06-03 RX ADMIN — CLOPIDOGREL BISULFATE 75 MG: 75 TABLET ORAL at 08:49

## 2024-06-03 RX ADMIN — PANTOPRAZOLE SODIUM 40 MG: 40 TABLET, DELAYED RELEASE ORAL at 08:48

## 2024-06-03 RX ADMIN — CALCIUM 500 MG: 500 TABLET ORAL at 16:51

## 2024-06-03 RX ADMIN — Medication 20 MG: at 21:10

## 2024-06-03 RX ADMIN — SENNOSIDES 17.2 MG: 8.6 TABLET, FILM COATED ORAL at 21:07

## 2024-06-03 RX ADMIN — ACETAMINOPHEN 650 MG: 325 TABLET ORAL at 21:07

## 2024-06-03 ASSESSMENT — ENCOUNTER SYMPTOMS
TROUBLE SWALLOWING: 0
VOMITING: 0
SHORTNESS OF BREATH: 0
SORE THROAT: 0
NAUSEA: 0
COUGH: 0
RHINORRHEA: 0
CONSTIPATION: 0
DIARRHEA: 0
ABDOMINAL PAIN: 0
BACK PAIN: 0
WHEEZING: 0

## 2024-06-03 ASSESSMENT — PAIN SCALES - GENERAL
PAINLEVEL_OUTOF10: 8
PAINLEVEL_OUTOF10: 3
PAINLEVEL_OUTOF10: 9

## 2024-06-03 ASSESSMENT — PAIN DESCRIPTION - LOCATION
LOCATION: KNEE
LOCATION: LEG

## 2024-06-03 ASSESSMENT — PAIN SCALES - WONG BAKER
WONGBAKER_NUMERICALRESPONSE: NO HURT

## 2024-06-03 ASSESSMENT — PAIN DESCRIPTION - ORIENTATION
ORIENTATION: RIGHT
ORIENTATION: RIGHT

## 2024-06-03 ASSESSMENT — PAIN DESCRIPTION - DESCRIPTORS
DESCRIPTORS: ACHING
DESCRIPTORS: STABBING

## 2024-06-03 NOTE — PROGRESS NOTES
Transfers:  Sit to Stand:Stand By Assistance, with increased time for completion  Stand to Sit:Stand By Assistance  To/From Bed and Chair:Stand By Assistance    Ambulation:  Contact Guard Assistance  Distance: 58' x 1, 12' x 1, 5' x 1  Surface: Level Tile  Device: Rolling Walker  Gait Deviations:  Forward Flexed Posture, Slow Bibi, Decreased Step Length Bilaterally, Decreased Weight Shift Right, Decreased Gait Speed, Decreased Terminal Knee Extension, and Increased reliance on walker    Stairs:  None    Balance:  Static Sitting Balance:  Independent  Dynamic Standing Balance: Stand By Assistance   While completing ADLs in restroom.     Neuromuscular Re-education  None    Exercise:  Patient was guided in 1 set(s) 10-20 reps of exercises: Glut sets, Seated marches, Long arc quads, Ankle pumps, Glut sets, Quad sets, Heelslides, Short arc quads, and Straight leg raises. Pt. Also pedaled portable bicycle to promote increased ROM in R knee. Exercises were completed for increased independence with functional mobility. Rest breaks required throughout. Pt. Able to achieve 90 degrees of knee flexion AROM and 95 degrees AAROM.    Functional Outcome Measures:   Not completed  Modified Hall Scale:  Not Applicable     ASSESSMENT:  Assessment: Patient progressing toward established goals.  Activity Tolerance:  Patient tolerance of  treatment: good.     Equipment Recommendations:Equipment Needed: No  Discharge Recommendations: Continue to assess pending progress, Patient would benefit from continued therapy after discharge     PLAN: Current Treatment Recommendations: Strengthening, Balance training, Functional mobility training, Transfer training, Endurance training, Gait training, Stair training, Neuromuscular re-education, Home exercise program, Safety education & training, Patient/Caregiver education & training, Equipment evaluation, education, & procurement, Therapeutic activities  General Plan:  (5x/wk  90min)    Patient Education  Patient Education: Plan of Care, Functional Mobility, Reviewed Prior Education, Health Promotion and Wellness Education, Safety, Verbal Exercise Instruction    Goals:  Patient Goals : decrease pain  Short Term Goals  Time Frame for Short Term Goals: 1 week  Short Term Goal 1: Patient will complete sit < > stand with CGA to stand to ambulate with decreased difficulty.  Short Term Goal 2: Patient will complete supine < > sit with CGA, head of bed flat, no rails to transfer in and out of bed safely.  Short Term Goal 3: Patient will ambulate 50' with a RW and CGA to increase mobility and progress towards navigating home safely.  Short Term Goal 4: Patient will ascend/descend 1, 4\" step in the parallel bars with CGA to progress towards safe home entry.  Short Term Goal 5: Patient will increase right knee AROM to 90 degrees to reduce pain and increase ease with transfers.  Long Term Goals  Time Frame for Long Term Goals : 3 weeks  Long Term Goal 1: Patient will complete supine < > sit with modified independence to transfer in and out of bed safely.  Long Term Goal 2: Patient will complete sit < > stand with modified independence to stand to ambulate safely.  Long Term Goal 3: Patient will complete bed < > chair transfer with a RW with modified independence transfer surface to surface safely.  Long Term Goal 4: Patient will ambulate 150' with a RW with modified independence to navigate home safely.  Long Term Goal 5: Patient will ascend/descend 3 steps with 1 hand rail and SBA to access/leave home.  Additional Goals?: Yes  Long term goal 6: Patient complete a car transfer with modified independence to transfer in and out of vehicle safely.    Following session, patient left in safe position with all fall risk precautions in place.

## 2024-06-03 NOTE — PLAN OF CARE
Problem: Discharge Planning  Goal: Discharge to home or other facility with appropriate resources  6/3/2024 1547 by Soledad Ward LISW  Note: SW met with patient on this date to discuss discharge planning. Patient reports having a follow up with his orthopedic surgeon on 6/6 at 2:45 PM in [place]. Their next available would not be for another month. Patient is requesting to be discharged on 6/6 before 12 PM. His brother in law will be providing transportation to the appointment. Patient could not idenitfy family to complete family training and reports being on his own at home. Patient would prefer [facility] at discharge. Patient reports having a difficult time this morning, but has had difficulty sleeping due to his Bipap. No outstanding needs.    SW faxed referral to [facility] on this date.    SW will follow and maintain involvement in discharge planning.

## 2024-06-03 NOTE — PLAN OF CARE
Problem: Discharge Planning  Goal: Discharge to home or other facility with appropriate resources  6/2/2024 2048 by Shaylee Goddard, RN  Outcome: Progressing  Flowsheets (Taken 6/2/2024 2046)  Discharge to home or other facility with appropriate resources: Identify barriers to discharge with patient and caregiver     Problem: Chronic Conditions and Co-morbidities  Goal: Patient's chronic conditions and co-morbidity symptoms are monitored and maintained or improved  6/2/2024 2048 by Shaylee Goddard, RN  Outcome: Progressing  Flowsheets (Taken 6/2/2024 2046)  Care Plan - Patient's Chronic Conditions and Co-Morbidity Symptoms are Monitored and Maintained or Improved: Monitor and assess patient's chronic conditions and comorbid symptoms for stability, deterioration, or improvement     Problem: Pain  Goal: Verbalizes/displays adequate comfort level or baseline comfort level  6/2/2024 2048 by Shaylee Goddard, RN  Outcome: Progressing     Problem: Safety - Adult  Goal: Free from fall injury  Outcome: Progressing

## 2024-06-03 NOTE — PROGRESS NOTES
RECREATIONAL THERAPY  [facility]      Date:  6/3/2024            Patient Name: Jake Dwyer           MRN: [id number]  Acct: [id number]          YOB: 1935 (89 y.o.)       Gender: male   Diagnosis: s/p total knee arthroscopy  Physician: Referring Practitioner: Quintin Boudreaux MD    REASON FOR MISSED TREATMENT:  Pt too fatigued to go outside with RT-     Katalina Erickson, CTRS    6/3/2024

## 2024-06-03 NOTE — PROGRESS NOTES
Comprehensive Nutrition Assessment    Type and Reason for Visit:  Reassess    Nutrition Recommendations/Plan:   Continue current diet, Glucerna ONS once/day.  Recommend continue MVI.  Trial Activia yogurt and hot beverage TID to promote BMs/assist w/ constipation.  Rx per MD to assist constipation.  Encouraged po, good nutrition at best efforts for healing.     Malnutrition Assessment:  Malnutrition Status:  At risk for malnutrition (Comment) (decreased appetite r/t advanced age and lack of mobility) (05/28/24 1227)    Context:  Acute Illness     Findings of the 6 clinical characteristics of malnutrition:  Energy Intake:  No significant decrease in energy intake (usually eats 2 meals per day, not very active so he does not work up a large appetite)  Weight Loss:  No significant weight loss     Body Fat Loss:  No significant body fat loss (some age related fat losses to orbital region)     Muscle Mass Loss:  No significant muscle mass loss    Fluid Accumulation:  No significant fluid accumulation     Strength:  Not Performed    Nutrition Assessment:     Pt. nutritionally compromised AEB wounds. At risk for further nutrition compromise r/t increased nutrient needs for wound healing, admitted from [facility] for rehab s/p right TKR. Noted underlying medical condition (PMHx arterial occlusion, arthritis, Afib, CAD, CHF, T2DM, HLD, HTN, hx skin cancer on lip).     Nutrition Related Findings:      Wound Type: Surgical Incision (Right knee TKR)     Pt. Report/Treatments/Miscellaneous:   6/3: pt. Seen - reports eating well; states acceptance of Glucerna; c/o constipation; has prunes and prune juice at bedside; agrees to trial Activia yogurt w/ meals to promote BMs; denies having any trouble w/ bowels pta  5/28: Per patient his appetite is just ok related to he is not very active. Patient reports that he follows a diabetic diet at home and would like to continue to be on a diabetic diet (RD ensured patient that he  Intake Outcomes: Diet Advancement/Tolerance, Food and Nutrient Intake, Supplement Intake  Physical Signs/Symptoms Outcomes: Biochemical Data, Chewing or Swallowing, GI Status, Fluid Status or Edema, Nutrition Focused Physical Findings, Skin, Weight    Discharge Planning:    Continue Oral Nutrition Supplement     Maryam Paige RD, LD  Contact: [phone]

## 2024-06-03 NOTE — PROGRESS NOTES
[facility]  INPATIENT PHYSICAL THERAPY  [facility] - 8K-07/007-A  Daily Note    Time In: 1430  Time Out: 1500  Timed Code Treatment Minutes: 30 Minutes  Minutes: 30          Date: 6/3/2024  Patient Name: Jake Dwyer,  Gender:  male        MRN: [id number]  : 1935  (89 y.o.)  Referral Date : 24  Referring Practitioner: Quintin Boudreaux MD  Diagnosis: S/P total knee arthroplasty, right  Additional Pertinent Hx: Per H&P:  The patient says he began having intermittent right knee buckling/collapse and occasional mild right knee pain started about 1 year ago gradually.  The symptoms progressively worsen and causing abnormal gait and some walking difficulty.  Therefore he saw his family physician more than 1 months ago for the problem.  Lumbar spine abnormality was suspected at the time.  X-ray and later CT of the lumbar spine later was done and he was discovered to have lumbar spinal stenosis.  Initially his PCP wanted to refer him to see orthospine surgeon at [facility]) but he requested to see orthopedic spine surgeon who he knew in [place] (? [facility]).  He later went for the scheduled visit with orthospine surgeon in [place] but was found to have right lower extremity \"bowing\" at the knee.  Therefore he saw orthopedic surgeon, Dr. Elan Wilson instead.  X-ray of right knee was done at the time and he was diagnosed of having severe right knee tricompartment osteoarthritis.  He was advised to consider right knee replacement surgery.  Therefore the patient was admitted to [facility] on 2024.  He then underwent right total knee arthroplasty surgery by Dr. Elan Wilson on 2024 at [facility].  He was allowed to weightbearing as tolerated on right lower extremity postoperatively.  On 2024 the patient exhibited symptomatic orthostatic hypotension during the PT

## 2024-06-03 NOTE — PROGRESS NOTES
[facility]  Occupational Therapy  Daily Note  Time:   Time In: 1011  Time Out: 1141  Timed Code Treatment Minutes: 90 Minutes  Minutes: 90          Date: 6/3/2024  Patient Name: Jake Dwyer,   Gender: male      Room: [facility]07/007-A  MRN: [id number]  : 1935  (89 y.o.)  Referring Practitioner: Quintin Boudreaux MD  Diagnosis: s/p total knee arthroscopy  Additional Pertinent Hx: Jake Dwyer is a 89 y.o. 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 Elan Wilson on 2024.  He was allowed to weightbearing as tolerated on right lower extremity postoperatively.  On 2024 the patient exhibited symptomatic orthostatic hypotension during the PT therapy session.  He was treated with IV fluid and holding parameter was placed on his antihypertensive medications.    Restrictions/Precautions:  Restrictions/Precautions: Fall Risk, Up as Tolerated, Weight Bearing  Right Lower Extremity Weight Bearing: Weight Bearing As Tolerated  Position Activity

## 2024-06-03 NOTE — PROGRESS NOTES
[facility]  INPATIENT REHABILITATION  TEAM CONFERENCE NOTE    Conference Date: 2024  Admit Date:  2024  2:15 PM  Patient Name: Jake Dwyer    MRN: [id number]    : 1935  (89 y.o.)  Rehabilitation Admitting Diagnosis:  History of total knee arthroplasty [Z96.659]  S/P total knee arthroplasty, right [Z96.651]  Referring Practitioner: Quintin Boudreaux MD      CASE MANAGEMENT  Current issues/needs regarding patient and family discharge status: Patient continues to be motivated and progressing with therapy. Patient plans to be discharged on Friday,  with home health services for RN, PT, OT and HHA. SW will follow and maintain involvement in discharge planning.    PHYSICAL THERAPY  Ruben is making fair progress. He has currently met 4/5 STG and 1/6 LTG. Pt is [place] for STSs to RW from multiple surfaces and SBA for ambulation with use of RW. Observation of decreased B step lengths, decreased gait speed, decreased weight shift to the R and increased reliance on RW. Sit <> supine with SBA due to increased time and difficulty with RLE management. Pt performs transfers with SBA due to minor safety concerns. During session, pt often hyperfixates on uncomfortable feelings of the RLE consistent with s/p TKA. Observation of minimal changes in edema measurements after kinesiotaping. He has been able to tolerate increased intensity of interventions including added reps and resistance. Knee flexion ROM ~90 degrees. Pt would continue to benefit from skilled PT interventions for improved R knee ROM, gait training, BLE strengthening, BP management, endurance training and functional mobility.  Equipment Needed: No    SPEECH THERAPY       OCCUPATIONAL THERAPY  Patient, \"Ruben\" is making steady progress on IPR. Pt has met 4/5 STGs and 0/5 LTGs. He has progressed to supervision with his functinoal toilet transfers. At last attempt, Ruben requires supervision with LB ADLs and supervision for UB ADLs. He  insight into deficits, Unrealistic expectations, Decreased endurance, Lower extremity weakness, Stairs at home, Skin Care, and Wound Care  Follow up with physiatrist? no  If yes, what timeframe? N/A    Team Members Present at Conference:  :NERY Dumas  Occupational Therapist:Barbi Ng OTR/L [id number]  Physical Therapist:Faby Villalobos, PT [id number]  Speech Therapist:N/A  Nurse:Malinda Danielle RN   Psychologist: N/A    I approve the established interdisciplinary plan of care as documented within the medical record of Jake Dwyer. I was present and led the interdisciplinary care conference.    MICHEL ALLISON MD  Electronically signed by MICHEL ALLISON MD on 6/5/2024 at 9:14 AM

## 2024-06-03 NOTE — PROGRESS NOTES
Physical Medicine & Rehabilitation Progress Note    Chief Complaint:  Status post right total knee replacement     Subjective:    Jake Dwyer is a 89 y.o. 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       The patient says he began having intermittent right knee buckling/collapse and occasional mild right knee pain started about 1 year ago gradually.  The symptoms progressively worsen and causing abnormal gait and some walking difficulty.  Therefore he saw his family physician more than 1 months ago for the problem.  Lumbar spine abnormality was suspected at the time.  X-ray and later CT of the lumbar spine later was done and he was discovered to have lumbar spinal stenosis.  Initially his PCP wanted to refer him to see orthospine surgeon at [facility] but he requested to see orthopedic spine surgeon who he knew in [place] ([facility]).  He later went for the scheduled visit with orthospine surgeon in [place] but was found to have right lower extremity \"bowing\" at the knee.  Therefore he saw orthopedic surgeon,  nursing continue to be involved for bowel, bladder, skin, and pain management.  Nursing will also provide education and training to patient and family.    Prophylaxis:  DVT: Patient on Eliquis, JUANITA stocking, intermittent pneumatic compression device.  GI: Colace, Senokot, Dulcolax suppository as needed, milk of magnesium as needed, GlycoLax as needed.  Pain: Tylenol, Tylenol as needed, oxycodone 2.5 to 5 mg as needed, Flexeril as needed, add Voltaren gel as needed  CBC with differential, BMP today  Continue pregabalin 25 mg every 12 hours for left upper extremity neuropathic pain  Continue Eliquis for atrial fibrillation, arterial vascular disease and coronary artery disease  Continue Lipitor for hyperlipidemia; plan to switch to Zocor when the patient's family bring patient's Zocor from home to be used  Continue aspirin, Plavix for coronary artery disease and peripheral arterial vascular disease  Completed 5-day course of Keflex every 6 hours (end on 5/28/2024) for postoperative surgical infection prophylaxis  Continue Cardura, Lasix, Entresto, Lopressor for hypertension and congestive heart failure; reduce Entresto dosage from Entresto 49-51 to Entresto 24-26  Continue Imdur for coronary artery disease  Continue Protonix for gastric protection  Continue Amaryl (substituted with glipizide), Humalog insulin coverage for diabetes mellitus  Continue vitamins D supplement  Continue multivitamin daily for anemia  Continue melatonin, trazodone for insomnia  Nutrition: Continue 60 g per meal carbohydrate regular diet.  Bladder: Monitoring signs or symptoms of UTI  Bowel: Monitoring signs or symptoms of constipation   and case management for coordination of care and discharge planning      Missed Therapy Time:  None      MICHEL ALLISON MD     This report has been created using voice recognition software. It may contain minor errors which are inherent in voice recognition technology

## 2024-06-03 NOTE — PROGRESS NOTES
Patient: Jake Dwyer  Unit/Bed: 8K-07/007-A  YOB: 1935  MRN: [id number] Acct: [id number]   Admitting Diagnosis: History of total knee arthroplasty [Z96.659]  S/P total knee arthroplasty, right [Z96.651]  Admit Date:  5/25/2024  Hospital Day: 9    Assessment:     Principal Problem:    S/P total knee arthroplasty, right  Active Problems:    Debility    Orthostatic hypotension    Class 1 obesity in adult    Primary hypertension    Mixed hyperlipidemia    Controlled type 2 diabetes mellitus with hyperglycemia (HCC)    Right leg swelling    Chronic atrial fibrillation (HCC)    Macular degeneration of left eye    Vision loss, right eye    Obstructive sleep apnea  Resolved Problems:    * No resolved hospital problems. *      Plan:     We discussed how he forgot to have the CS checked before he ate his breakfast.   Will increase the morning metformin.        Subjective:     Patient has no complaint of CP or SOB..   Medication side effects: none    Scheduled Meds:   [START ON 6/4/2024] naloxegol  25 mg Oral QAM AC    docusate sodium  100 mg Oral TID    [START ON 6/4/2024] metFORMIN  500 mg Oral Dinner    [START ON 6/4/2024] metFORMIN  1,000 mg Oral Daily with breakfast    sacubitril-valsartan  1 tablet Oral BID    simvastatin  20 mg Oral Nightly    magnesium hydroxide  15 mL Oral Daily    pregabalin  25 mg Oral Q12H    polyethylene glycol  17 g Oral Daily    metoprolol tartrate  12.5 mg Oral Q12H    calcium elemental  500 mg Oral BID WC    apixaban  5 mg Oral BID    doxazosin  4 mg Oral Nightly    pantoprazole  40 mg Oral Daily    aspirin  81 mg Oral Daily    clopidogrel  75 mg Oral Daily    furosemide  20 mg Oral Daily    isosorbide mononitrate  30 mg Oral Daily    senna  2 tablet Oral Nightly    acetaminophen  650 mg Oral Q6H    multivitamin  1 tablet Oral Daily    Vitamin D  1,000 Units Oral Daily    melatonin  6 mg Oral Nightly     Continuous Infusions:  PRN Meds:diclofenac sodium, docusate sodium,

## 2024-06-03 NOTE — PROGRESS NOTES
Physical Medicine & Rehabilitation Progress Note    Chief Complaint:  Status post right total knee replacement     Subjective:    Jake Dwyer is a 89 y.o. 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       The patient says he began having intermittent right knee buckling/collapse and occasional mild right knee pain started about 1 year ago gradually.  The symptoms progressively worsen and causing abnormal gait and some walking difficulty.  Therefore he saw his family physician more than 1 months ago for the problem.  Lumbar spine abnormality was suspected at the time.  X-ray and later CT of the lumbar spine later was done and he was discovered to have lumbar spinal stenosis.  Initially his PCP wanted to refer him to see orthospine surgeon at [facility]) but he requested to see orthopedic spine surgeon who he knew in [place] ([facility]).  He later went for the scheduled visit with orthospine surgeon in [place] but was found to have right lower extremity \"bowing\" at the knee.  Therefore he saw orthopedic surgeon,

## 2024-06-03 NOTE — PLAN OF CARE
Problem: Discharge Planning  Goal: Discharge to home or other facility with appropriate resources  Outcome: Progressing  Flowsheets (Taken 6/2/2024 2046 by Shaylee Goddard, RN)  Discharge to home or other facility with appropriate resources: Identify barriers to discharge with patient and caregiver     Problem: Chronic Conditions and Co-morbidities  Goal: Patient's chronic conditions and co-morbidity symptoms are monitored and maintained or improved  Outcome: Progressing  Flowsheets (Taken 6/2/2024 2046 by Shaylee Goddard, RN)  Care Plan - Patient's Chronic Conditions and Co-Morbidity Symptoms are Monitored and Maintained or Improved: Monitor and assess patient's chronic conditions and comorbid symptoms for stability, deterioration, or improvement     Problem: Pain  Goal: Verbalizes/displays adequate comfort level or baseline comfort level  Outcome: Progressing  Flowsheets (Taken 6/3/2024 1137)  Verbalizes/displays adequate comfort level or baseline comfort level: Encourage patient to monitor pain and request assistance     Problem: Safety - Adult  Goal: Free from fall injury  Outcome: Progressing  Flowsheets (Taken 5/30/2024 2317 by Anny Keane, RN)  Free From Fall Injury: Instruct family/caregiver on patient safety     Problem: ABCDS Injury Assessment  Goal: Absence of physical injury  Outcome: Progressing  Flowsheets (Taken 6/2/2024 1418 by Malinda Danielle, RN)  Absence of Physical Injury: Implement safety measures based on patient assessment     Problem: Respiratory - Adult  Goal: Achieves optimal ventilation and oxygenation  Outcome: Progressing  Flowsheets (Taken 6/3/2024 1137)  Achieves optimal ventilation and oxygenation: Assess for changes in respiratory status     Problem: Cardiovascular - Adult  Goal: Maintains optimal cardiac output and hemodynamic stability  Outcome: Progressing  Flowsheets (Taken 6/3/2024 1137)  Maintains optimal cardiac output and hemodynamic stability: Monitor blood pressure

## 2024-06-03 NOTE — CARE COORDINATION
Patient informed this RN he has been sleeping poorly lately from his CPAP mask not fitting well. This RN called [employer] the company who supplied is machine and masks to have them come and fit him for a new mask. As of 1500 on 6/2/24 [employer] has not came yet to fit patient for new mask.

## 2024-06-04 LAB
ANION GAP SERPL CALC-SCNC: 13 MEQ/L (ref 8–16)
BASOPHILS ABSOLUTE: 0 THOU/MM3 (ref 0–0.1)
BASOPHILS NFR BLD AUTO: 0.4 %
BUN SERPL-MCNC: 19 MG/DL (ref 7–22)
CALCIUM SERPL-MCNC: 8.6 MG/DL (ref 8.5–10.5)
CHLORIDE SERPL-SCNC: 96 MEQ/L (ref 98–111)
CO2 SERPL-SCNC: 23 MEQ/L (ref 23–33)
CREAT SERPL-MCNC: 0.7 MG/DL (ref 0.4–1.2)
DEPRECATED RDW RBC AUTO: 59.7 FL (ref 35–45)
EOSINOPHIL NFR BLD AUTO: 1.3 %
EOSINOPHILS ABSOLUTE: 0.1 THOU/MM3 (ref 0–0.4)
ERYTHROCYTE [DISTWIDTH] IN BLOOD BY AUTOMATED COUNT: 14.7 % (ref 11.5–14.5)
GFR SERPL CREATININE-BSD FRML MDRD: 88 ML/MIN/1.73M2
GLUCOSE BLD STRIP.AUTO-MCNC: 147 MG/DL (ref 70–108)
GLUCOSE SERPL-MCNC: 178 MG/DL (ref 70–108)
HCT VFR BLD AUTO: 29.9 % (ref 42–52)
HGB BLD-MCNC: 10.1 GM/DL (ref 14–18)
IMM GRANULOCYTES # BLD AUTO: 0.06 THOU/MM3 (ref 0–0.07)
IMM GRANULOCYTES NFR BLD AUTO: 0.8 %
LYMPHOCYTES ABSOLUTE: 0.9 THOU/MM3 (ref 1–4.8)
LYMPHOCYTES NFR BLD AUTO: 11.1 %
MCH RBC QN AUTO: 37.1 PG (ref 26–33)
MCHC RBC AUTO-ENTMCNC: 33.8 GM/DL (ref 32.2–35.5)
MCV RBC AUTO: 109.9 FL (ref 80–94)
MONOCYTES ABSOLUTE: 0.6 THOU/MM3 (ref 0.4–1.3)
MONOCYTES NFR BLD AUTO: 7.7 %
NEUTROPHILS ABSOLUTE: 6.2 THOU/MM3 (ref 1.8–7.7)
NEUTROPHILS NFR BLD AUTO: 78.7 %
NRBC BLD AUTO-RTO: 0 /100 WBC
PLATELET # BLD AUTO: 323 THOU/MM3 (ref 130–400)
PMV BLD AUTO: 8.9 FL (ref 9.4–12.4)
POTASSIUM SERPL-SCNC: 4.7 MEQ/L (ref 3.5–5.2)
RBC # BLD AUTO: 2.72 MILL/MM3 (ref 4.7–6.1)
SODIUM SERPL-SCNC: 132 MEQ/L (ref 135–145)
WBC # BLD AUTO: 7.9 THOU/MM3 (ref 4.8–10.8)

## 2024-06-04 PROCEDURE — 97535 SELF CARE MNGMENT TRAINING: CPT

## 2024-06-04 PROCEDURE — 80048 BASIC METABOLIC PNL TOTAL CA: CPT

## 2024-06-04 PROCEDURE — 36415 COLL VENOUS BLD VENIPUNCTURE: CPT

## 2024-06-04 PROCEDURE — 82948 REAGENT STRIP/BLOOD GLUCOSE: CPT

## 2024-06-04 PROCEDURE — 1180000000 HC REHAB R&B

## 2024-06-04 PROCEDURE — 6370000000 HC RX 637 (ALT 250 FOR IP): Performed by: PHYSICAL MEDICINE & REHABILITATION

## 2024-06-04 PROCEDURE — 97530 THERAPEUTIC ACTIVITIES: CPT

## 2024-06-04 PROCEDURE — 6370000000 HC RX 637 (ALT 250 FOR IP): Performed by: NURSE PRACTITIONER

## 2024-06-04 PROCEDURE — 97116 GAIT TRAINING THERAPY: CPT

## 2024-06-04 PROCEDURE — 99232 SBSQ HOSP IP/OBS MODERATE 35: CPT | Performed by: PHYSICAL MEDICINE & REHABILITATION

## 2024-06-04 PROCEDURE — 6370000000 HC RX 637 (ALT 250 FOR IP): Performed by: FAMILY MEDICINE

## 2024-06-04 PROCEDURE — 85025 COMPLETE CBC W/AUTO DIFF WBC: CPT

## 2024-06-04 PROCEDURE — 97110 THERAPEUTIC EXERCISES: CPT

## 2024-06-04 RX ORDER — TRAZODONE HYDROCHLORIDE 50 MG/1
50 TABLET ORAL NIGHTLY
Status: DISCONTINUED | OUTPATIENT
Start: 2024-06-04 | End: 2024-06-06 | Stop reason: HOSPADM

## 2024-06-04 RX ADMIN — METFORMIN HYDROCHLORIDE 1000 MG: 500 TABLET, EXTENDED RELEASE ORAL at 08:46

## 2024-06-04 RX ADMIN — Medication 1 TABLET: at 08:44

## 2024-06-04 RX ADMIN — PANTOPRAZOLE SODIUM 40 MG: 40 TABLET, DELAYED RELEASE ORAL at 08:42

## 2024-06-04 RX ADMIN — MAGNESIUM HYDROXIDE 15 ML: 1200 LIQUID ORAL at 08:42

## 2024-06-04 RX ADMIN — Medication 20 MG: at 21:07

## 2024-06-04 RX ADMIN — METOPROLOL TARTRATE 12.5 MG: 25 TABLET, FILM COATED ORAL at 21:06

## 2024-06-04 RX ADMIN — SENNOSIDES 17.2 MG: 8.6 TABLET, FILM COATED ORAL at 21:04

## 2024-06-04 RX ADMIN — FUROSEMIDE 20 MG: 20 TABLET ORAL at 08:46

## 2024-06-04 RX ADMIN — APIXABAN 5 MG: 5 TABLET, FILM COATED ORAL at 08:45

## 2024-06-04 RX ADMIN — OXYCODONE 5 MG: 5 TABLET ORAL at 17:34

## 2024-06-04 RX ADMIN — OXYCODONE 5 MG: 5 TABLET ORAL at 04:05

## 2024-06-04 RX ADMIN — Medication 1000 UNITS: at 08:46

## 2024-06-04 RX ADMIN — CLOPIDOGREL BISULFATE 75 MG: 75 TABLET ORAL at 08:42

## 2024-06-04 RX ADMIN — DOCUSATE SODIUM 100 MG: 100 CAPSULE, LIQUID FILLED ORAL at 21:04

## 2024-06-04 RX ADMIN — DOCUSATE SODIUM 100 MG: 100 CAPSULE, LIQUID FILLED ORAL at 15:19

## 2024-06-04 RX ADMIN — ACETAMINOPHEN 650 MG: 325 TABLET ORAL at 08:56

## 2024-06-04 RX ADMIN — CYCLOBENZAPRINE 5 MG: 10 TABLET, FILM COATED ORAL at 21:04

## 2024-06-04 RX ADMIN — SACUBITRIL AND VALSARTAN 1 TABLET: 24; 26 TABLET, FILM COATED ORAL at 21:07

## 2024-06-04 RX ADMIN — DICLOFENAC SODIUM 2 G: 10 GEL TOPICAL at 16:49

## 2024-06-04 RX ADMIN — APIXABAN 5 MG: 5 TABLET, FILM COATED ORAL at 21:05

## 2024-06-04 RX ADMIN — ACETAMINOPHEN 650 MG: 325 TABLET ORAL at 21:04

## 2024-06-04 RX ADMIN — Medication 6 MG: at 21:04

## 2024-06-04 RX ADMIN — ISOSORBIDE MONONITRATE 30 MG: 30 TABLET, EXTENDED RELEASE ORAL at 08:45

## 2024-06-04 RX ADMIN — SACUBITRIL AND VALSARTAN 1 TABLET: 49; 51 TABLET, FILM COATED ORAL at 08:45

## 2024-06-04 RX ADMIN — CALCIUM 500 MG: 500 TABLET ORAL at 08:45

## 2024-06-04 RX ADMIN — NALOXEGOL OXALATE 25 MG: 25 TABLET, FILM COATED ORAL at 06:16

## 2024-06-04 RX ADMIN — METOPROLOL TARTRATE 12.5 MG: 25 TABLET, FILM COATED ORAL at 08:45

## 2024-06-04 RX ADMIN — TRAZODONE HYDROCHLORIDE 50 MG: 50 TABLET ORAL at 21:04

## 2024-06-04 RX ADMIN — PREGABALIN 25 MG: 25 CAPSULE ORAL at 21:04

## 2024-06-04 RX ADMIN — METFORMIN HYDROCHLORIDE 500 MG: 500 TABLET, EXTENDED RELEASE ORAL at 16:43

## 2024-06-04 RX ADMIN — ASPIRIN 81 MG: 81 TABLET, COATED ORAL at 08:42

## 2024-06-04 RX ADMIN — POLYETHYLENE GLYCOL 3350 17 G: 17 POWDER, FOR SOLUTION ORAL at 08:42

## 2024-06-04 RX ADMIN — PREGABALIN 25 MG: 25 CAPSULE ORAL at 08:42

## 2024-06-04 RX ADMIN — ACETAMINOPHEN 650 MG: 325 TABLET ORAL at 15:19

## 2024-06-04 RX ADMIN — CALCIUM 500 MG: 500 TABLET ORAL at 16:43

## 2024-06-04 RX ADMIN — DOXAZOSIN 4 MG: 4 TABLET ORAL at 21:05

## 2024-06-04 RX ADMIN — DOCUSATE SODIUM 100 MG: 100 CAPSULE, LIQUID FILLED ORAL at 08:42

## 2024-06-04 ASSESSMENT — PAIN SCALES - GENERAL
PAINLEVEL_OUTOF10: 7
PAINLEVEL_OUTOF10: 7
PAINLEVEL_OUTOF10: 0
PAINLEVEL_OUTOF10: 0
PAINLEVEL_OUTOF10: 1

## 2024-06-04 ASSESSMENT — ENCOUNTER SYMPTOMS
SHORTNESS OF BREATH: 0
SORE THROAT: 0
VOMITING: 0
TROUBLE SWALLOWING: 0
WHEEZING: 0
BACK PAIN: 0
RHINORRHEA: 0
CONSTIPATION: 0
NAUSEA: 0
DIARRHEA: 0
ABDOMINAL PAIN: 0
COUGH: 0

## 2024-06-04 ASSESSMENT — PAIN - FUNCTIONAL ASSESSMENT: PAIN_FUNCTIONAL_ASSESSMENT: ACTIVITIES ARE NOT PREVENTED

## 2024-06-04 ASSESSMENT — PAIN DESCRIPTION - DESCRIPTORS: DESCRIPTORS: ACHING;DISCOMFORT

## 2024-06-04 ASSESSMENT — PAIN DESCRIPTION - ORIENTATION: ORIENTATION: RIGHT

## 2024-06-04 ASSESSMENT — PAIN DESCRIPTION - LOCATION: LOCATION: KNEE

## 2024-06-04 NOTE — PLAN OF CARE
Problem: Discharge Planning  Goal: Discharge to home or other facility with appropriate resources  Outcome: Progressing  Flowsheets (Taken 6/4/2024 1029)  Discharge to home or other facility with appropriate resources: Identify discharge learning needs (meds, wound care, etc)     Problem: Chronic Conditions and Co-morbidities  Goal: Patient's chronic conditions and co-morbidity symptoms are monitored and maintained or improved  Outcome: Progressing  Flowsheets (Taken 6/2/2024 2046 by Shaylee Goddard, RN)  Care Plan - Patient's Chronic Conditions and Co-Morbidity Symptoms are Monitored and Maintained or Improved: Monitor and assess patient's chronic conditions and comorbid symptoms for stability, deterioration, or improvement     Problem: Pain  Goal: Verbalizes/displays adequate comfort level or baseline comfort level  6/4/2024 1029 by Erma Collins LPN  Outcome: Progressing  Flowsheets (Taken 6/4/2024 1029)  Verbalizes/displays adequate comfort level or baseline comfort level: Encourage patient to monitor pain and request assistance  6/3/2024 2227 by Anny Keane RN  Outcome: Progressing  Flowsheets (Taken 6/3/2024 2227)  Verbalizes/displays adequate comfort level or baseline comfort level:   Encourage patient to monitor pain and request assistance   Assess pain using appropriate pain scale     Problem: ABCDS Injury Assessment  Goal: Absence of physical injury  6/4/2024 1029 by Erma Collins LPN  Outcome: Progressing  Flowsheets (Taken 6/3/2024 2227 by Anny Keane, RN)  Absence of Physical Injury: Implement safety measures based on patient assessment  6/3/2024 2227 by Anny Keane RN  Outcome: Progressing  Flowsheets (Taken 6/3/2024 2227)  Absence of Physical Injury: Implement safety measures based on patient assessment     Problem: Respiratory - Adult  Goal: Achieves optimal ventilation and oxygenation  Outcome: Progressing  Flowsheets (Taken 6/3/2024 1137)  Achieves optimal ventilation and  respiratory therapy assess nares and determine need for appliance change or resting period.  Outcome: Progressing

## 2024-06-04 NOTE — FLOWSHEET NOTE
06/04/24 1122   Treatment Team Notification   Reason for Communication Abnormal vitals  (Patient BP 76/45 automatic. Manual BP 90/60. Patient asymptomatic besides feeling tired. Dr eastman ordered BMP CBC and decreased entresto dose.)   Name of Team Member Notified Dr Sy   Treatment Team Role Consulting Provider   Method of Communication Secure Message   Response Waiting for response   Notification Time 112

## 2024-06-04 NOTE — PLAN OF CARE
Problem: Pain  Goal: Verbalizes/displays adequate comfort level or baseline comfort level  6/3/2024 2227 by Anny Keane, RN  Outcome: Progressing  Flowsheets (Taken 6/3/2024 2227)  Verbalizes/displays adequate comfort level or baseline comfort level:   Encourage patient to monitor pain and request assistance   Assess pain using appropriate pain scale     Problem: Safety - Adult  Goal: Free from fall injury  6/3/2024 2227 by Anny Keane, RN  Outcome: Progressing  Flowsheets (Taken 6/3/2024 2227)  Free From Fall Injury: Instruct family/caregiver on patient safety     Problem: ABCDS Injury Assessment  Goal: Absence of physical injury  6/3/2024 2227 by Anny Keane, RN  Outcome: Progressing  Flowsheets (Taken 6/3/2024 2227)  Absence of Physical Injury: Implement safety measures based on patient assessment

## 2024-06-04 NOTE — PROGRESS NOTES
RW  Ambulatory transfer bedside chair > EOB with use of RW and SBA.     Ambulation:  Stand By Assistance  Distance: 10ft x 2  Surface: Level Tile  Device: Rolling Walker  Gait Deviations:  Decreased Step Length Bilaterally, Decreased Weight Shift Right, and Decreased Gait Speed     Stand By Assistance, Contact Guard Assistance, with increased time for completion  Distance: 129'  Surface: Level Tile  Device:Rolling Walker  Gait Deviations:  Forward Flexed Posture, Slow Bibi, Wide Base of Support, Unsteady Gait, and Increased Reliance on Rolling Walker  Cues for improved posture, and for increased awareness to activity tolerance      Balance:  Static Sitting Balance:  Stand By Assistance  Dynamic Sitting Balance: Stand By Assistance  Static Standing Balance: Stand By Assistance, Contact Guard Assistance  Dynamic Standing Balance: Contact Guard Assistance       OT: Reviewed.    COGNITION: Decreased Insight, Decreased Problem Solving, and Decreased Safety Awareness     ADL:   Grooming: Pt completed hand hygiene, oral hygiene and washed face standing sink side. Pt tolerated standing for 5min with 1-2 hand release from RW and sink to complete tasks with supervision. Pt required 1 vc to locate cup for rinsing mouth d/t visual deficits.    Upper Extremity Dressing: Pt doffed and donned pullover shirt with supervision seated. Pt required 1 vc to problem solve shirt being donned backwards.   Lower Extremity Dressing: Pt doffed and donned depends and pants with supervision. Pt demoed good problem solving to thread affected limb first to improve dressing performance. In standing, pt cleared LB clothing over hips with 1-2 hand release from RW.   Footwear Management: Pt doffed and donned R and L sock and L compression stocking seated with supervision. Required Max A to don R compression stocking.  Toileting: On elevated toliet seat, pt cleared LB over hips to don/doff with supervision. No lucy-care completed.  Toilet Transfer:  anemia  Continue melatonin, trazodone for insomnia  Nutrition: Continue 60 g per meal carbohydrate regular diet.  Bladder: Monitoring signs or symptoms of UTI  Bowel: Monitoring signs or symptoms of constipation   and case management for coordination of care and discharge planning      Missed Therapy Time:  None      MICHEL ALLISON MD     This report has been created using voice recognition software. It may contain minor errors which are inherent in voice recognition technology

## 2024-06-04 NOTE — CARE COORDINATION
Patient given AM BP meds.  Patient stated feeling tired. Automatic BP 76/45. Manual BP 90/60. Dr Boudreaux adjusted entresto and Dr Sy notified. Patient encouraged to drink more fluids. Monitoring BP closely. Patient working with therapy in chair. BP rechecked at 1715 /58. PRN oxy was given per patient request

## 2024-06-04 NOTE — PROGRESS NOTES
RECREATIONAL THERAPY  [facility]      Date:  6/4/2024            Patient Name: Jake Dwyer           MRN: [id number]  Acct: [id number]          YOB: 1935 (89 y.o.)       Gender: male   Diagnosis: s/p total knee arthroscopy  Physician: Referring Practitioner: Quintin Boudreaux MD    REASON FOR MISSED TREATMENT:  Pt asleep in his recliner this am    Katalina Erickson, CTRS    6/4/2024

## 2024-06-04 NOTE — PLAN OF CARE
Problem: Discharge Planning  Goal: Discharge to home or other facility with appropriate resources  6/4/2024 1537 by Soledad Ward LISW  Note: SAIMA spoke with Dulce from [facility]. Dulce reports that they can accept patient for admission to their services for RN, PT, OT and HHA. SW will follow and maintain involvement in discharge planning.

## 2024-06-04 NOTE — PROGRESS NOTES
[facility]  Diagnosis List for Inpatient Rehab facility (IRF) - Patient Assessment Instrument (GABRIELLE)    Patient Name: Jake Dwyer        MRN: [id number]    : 1935  (89 y.o.)  Gender: male     Primary impairment requiring rehabilitation: 8.61 Status Post Unilateral Knee Replacement      Etiologic Diagnosis that led to the condition: Right knee severe tricompartmental osteoarthritis     Comorbid conditions affecting rehabilitation:  Debility/physical decondition after right total knee arthroplasty surgery complicated by hypotension episode  Status post right total knee arthroplasty surgery for right knee severe tricompartmental osteoarthritis  Right leg pain with right knee swelling, to rule out right lower extremity deep vein thrombosis  Irregular heartbeat/arrhythmia due to atrial fibrillation requiring anticoagulation therapy with Eliquis  Hypertension  Orthostatic hypotension  Hyperlipidemia  Obesity  Anemia  Diabetes mellitus type 2 with hyperglycemia  Right eye blindness  Left eye macular degeneration  History of coronary artery disease requiring coronary artery stenting  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

## 2024-06-04 NOTE — PROGRESS NOTES
[facility]  Inpatient Rehabilitation  Occupational Therapy  Progress Note  Time:  Time In: 0700  Time Out: 0830  Timed Code Treatment Minutes: 90 Minutes  Minutes: 90        Date: 2024  Patient Name: Jake Dwyer,   Gender: male      Room: [facility]/007-A  MRN: [id number]  : 1935  (89 y.o.)  Referring Practitioner: Quintin Boudreaux MD  Diagnosis: s/p total knee arthroscopy  Additional Pertinent Hx: Jake Dwyer is a 89 y.o. 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, left eye macular degeneration, coronary artery disease requiring 2 coronary artery stents placement, left upper extremity neuropathy, congestive heart failure, atrial fibrillation requiring anticoagulation therapy with Eliquis, sleep apnea, status post lumbar spine surgery, status post bilateral carpal tunnel release surgeries, status post right inguinal and umbilical hernia repairs, left common iliac artery occlusion requiring stenting, status post left fourth trigger finger surgery, status post hydrocele excision, is admitted to the inpatient rehabilitation unit from [facility] on 2024 for intensive inpatient rehabilitation treatment of impairment and disability after right total knee arthroplasty surgery by Dr. Elan Wilson on 2024.  He was allowed to weightbearing as tolerated on right lower extremity postoperatively.  On 2024 the patient exhibited symptomatic orthostatic hypotension during the PT therapy session.  He was treated with IV fluid and holding parameter was placed on his antihypertensive medications.    Restrictions/Precautions:  Restrictions/Precautions: Fall Risk, Up as Tolerated, Weight Bearing  Right Lower Extremity Weight Bearing: Weight Bearing As Tolerated  Position Activity Restriction  Other  training    Education:  Learners: Patient  Plan of Care, ADL's, Fall Prevention, and Assistive Device Safety    Goals  Short Term Goals  Time Frame for Short Term Goals: 2 weeks from evaluation  Short Term Goal 1: Pt will navigate HH distances using LRAD at SBA without additional cues for safe walker management or obstacle negotiation to improve indep with ADL access. GOAL NOT MET, CONTINUE  Short Term Goal 2: Pt will tolerate x3-4 min standing with 1-2 UE release at CGA to improve dynamic standing required for sinkside ADLs. GOAL MET, DISCONTINUE   Short Term Goal 3: Pt will complete toileting routine with Minimal A and 0-1 cues for problem solving to improve indep with ADLs. GOAL MET, DISCONTINUE  Short Term Goal 4: Pt will perform all ADL transfers simulating home set up with CGA to improve indep access to ADLs. GOAL MET, REVISED  Short Term Goal 5: Pt will complete UB/LB dressing with minimal A utilziing LHAE as needed to improve indep with BADLs. GOAL MET, DISCONTINUE  Long Term Goals  Time Frame for Long Term Goals : 2 weeks from evaluation  Long Term Goal 1: Pt will navigate HH distances using LRAD at Supervision without additional cues for safe walker management or obstacle negotiation to improve indep with ADL access. GOAL NOT MET, CONTINUE  Long Term Goal 2: Pt will tolerate x5-8 min standing with 1-2 UE release at supervision to improve dynamic standing required for sinkside IADLs. GOAL MET, CONTINUE FOR CONSISTENCY  Long Term Goal 3: Pt will perform in shower bathing routine simulating home set up with supervision to improve indep with ADL routines. GOAL NOT MET CONSISTENTLY, CONTINUE  Long Term Goal 4: Pt will complete UB/LB dressing/bathing with supervision using LHAE as needed and 0-1 cues for technique to improve indep with ADLs. GOAL NOT CONSISTENTLY MET, CONTINUE  Long Term Goal 5: Pt will perform light meal prep routine with Supervision to improve indep with IADLs. GOAL NOT MET,

## 2024-06-04 NOTE — PROGRESS NOTES
Patient: Jake Dwyer  Unit/Bed: 8K-07/007-A  YOB: 1935  MRN: [id number] Acct: [id number]   Admitting Diagnosis: History of total knee arthroplasty [Z96.659]  S/P total knee arthroplasty, right [Z96.651]  Admit Date:  5/25/2024  Hospital Day: 10    Assessment:     Principal Problem:    S/P total knee arthroplasty, right  Active Problems:    Debility    Orthostatic hypotension    Class 1 obesity in adult    Primary hypertension    Mixed hyperlipidemia    Controlled type 2 diabetes mellitus with hyperglycemia (HCC)    Right leg swelling    Chronic atrial fibrillation (HCC)    Macular degeneration of left eye    Vision loss, right eye    Obstructive sleep apnea  Resolved Problems:    * No resolved hospital problems. *      Plan:     Today's CS was definitely before breakfast.        Subjective:     Patient has no complaint of CP or SOB..   Medication side effects: none    Scheduled Meds:   naloxegol  25 mg Oral QAM AC    docusate sodium  100 mg Oral TID    metFORMIN  500 mg Oral Dinner    metFORMIN  1,000 mg Oral Daily with breakfast    sacubitril-valsartan  1 tablet Oral BID    simvastatin  20 mg Oral Nightly    magnesium hydroxide  15 mL Oral Daily    pregabalin  25 mg Oral Q12H    polyethylene glycol  17 g Oral Daily    metoprolol tartrate  12.5 mg Oral Q12H    calcium elemental  500 mg Oral BID WC    apixaban  5 mg Oral BID    doxazosin  4 mg Oral Nightly    pantoprazole  40 mg Oral Daily    aspirin  81 mg Oral Daily    clopidogrel  75 mg Oral Daily    furosemide  20 mg Oral Daily    isosorbide mononitrate  30 mg Oral Daily    senna  2 tablet Oral Nightly    acetaminophen  650 mg Oral Q6H    multivitamin  1 tablet Oral Daily    Vitamin D  1,000 Units Oral Daily    melatonin  6 mg Oral Nightly     Continuous Infusions:  PRN Meds:diclofenac sodium, docusate sodium, cyclobenzaprine, bisacodyl, magnesium hydroxide, acetaminophen, oxyCODONE **OR** oxyCODONE, ondansetron, aluminum & magnesium

## 2024-06-04 NOTE — PROGRESS NOTES
[facility]  INPATIENT PHYSICAL THERAPY  DAILY NOTE  [facility] - 8K-07/007-A    Time In: 1330  Time Out: 1400  Timed Code Treatment Minutes: 30 Minutes  Minutes: 30          Date: 2024  Patient Name: Jake Dwyer,  Gender:  male        MRN: [id number]  : 1935  (89 y.o.)  Referral Date : 24  Referring Practitioner: Quintin Boudreaux MD  Diagnosis: S/P total knee arthroplasty, right  Additional Pertinent Hx: Per H&P:  The patient says he began having intermittent right knee buckling/collapse and occasional mild right knee pain started about 1 year ago gradually.  The symptoms progressively worsen and causing abnormal gait and some walking difficulty.  Therefore he saw his family physician more than 1 months ago for the problem.  Lumbar spine abnormality was suspected at the time.  X-ray and later CT of the lumbar spine later was done and he was discovered to have lumbar spinal stenosis.  Initially his PCP wanted to refer him to see orthospine surgeon at [facility] but he requested to see orthopedic spine surgeon who he knew in [place] (? [facility]).  He later went for the scheduled visit with orthospine surgeon in [place] but was found to have right lower extremity \"bowing\" at the knee.  Therefore he saw orthopedic surgeon, Dr. Elan Wilson instead.  X-ray of right knee was done at the time and he was diagnosed of having severe right knee tricompartment osteoarthritis.  He was advised to consider right knee replacement surgery.  Therefore the patient was admitted to [facility] on 2024.  He then underwent right total knee arthroplasty surgery by Dr. Elan Wilson on 2024 at [facility].  He was allowed to weightbearing as tolerated on right lower extremity postoperatively.  On 2024 the patient exhibited symptomatic orthostatic hypotension during the PT  Patient will ambulate 50' with a RW and CGA to increase mobility and progress towards navigating home safely.  Short Term Goal 4: Patient will ascend/descend 1, 4\" step in the parallel bars with CGA to progress towards safe home entry.  Short Term Goal 5: Patient will increase right knee AROM to 90 degrees to reduce pain and increase ease with transfers.  Long Term Goals  Time Frame for Long Term Goals : 3 weeks  Long Term Goal 1: Patient will complete supine < > sit with modified independence to transfer in and out of bed safely.  Long Term Goal 2: Patient will complete sit < > stand with modified independence to stand to ambulate safely.  Long Term Goal 3: Patient will complete bed < > chair transfer with a RW with modified independence transfer surface to surface safely.  Long Term Goal 4: Patient will ambulate 150' with a RW with modified independence to navigate home safely.  Long Term Goal 5: Patient will ascend/descend 3 steps with 1 hand rail and SBA to access/leave home.  Additional Goals?: Yes  Long term goal 6: Patient complete a car transfer with modified independence to transfer in and out of vehicle safely.    Following session, patient left in safe position with all fall risk precautions in place.

## 2024-06-04 NOTE — DISCHARGE INSTR - COC
Continuity of Care Form    Patient Name: Jake Dwyer   :  1935  MRN:  [id number]    Admit date:  2024  Discharge date:  ***    Code Status Order: Full Code   Advance Directives:     Admitting Physician:  Quintin Boudreaux MD  PCP: Jose Alberto Leavitt MD    Discharging Nurse: ***  Discharging Hospital Unit/Room#: 8K-07/007-A  Discharging Unit Phone Number: ***    Emergency Contact:   Extended Emergency Contact Information  Primary Emergency Contact: Nora Joseph  Address: [address] [place] [address] [facility]  Home Phone: [phone]  Mobile Phone: [phone]  Relation: Spouse  Secondary Emergency Contact: Ted Toussaint  Mobile Phone: [phone]  Relation: Other    Past Surgical History:  Past Surgical History:   Procedure Laterality Date    ANKLE SURGERY Left     For joint dislocation and vascular injury    CARPAL TUNNEL RELEASE Bilateral     In     CORONARY STENT PLACEMENT      2 stents    EYE REMOVAL Right     right eye ; with implantation of prosthetic eye    FINGER TRIGGER RELEASE Left     Left fourth finger    HYDROCELE EXCISION      INGUINAL HERNIA REPAIR Right     In     LUMBAR SPINE SURGERY  10/2018    MOHS SURGERY Left 2023    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    Active Problems:  Patient Active Problem List   Diagnosis Code    S/P total knee arthroplasty, right Z96.651    Debility R53.81    Orthostatic hypotension I95.1    Class 1 obesity in adult E66.9    Primary hypertension I10    Mixed hyperlipidemia E78.2    Controlled type 2 diabetes mellitus with hyperglycemia (HCC) E11.65    Right leg swelling M79.89    Chronic atrial fibrillation (HCC) I48.20    Macular  degeneration of left eye H35.30    Vision loss, right eye H54.61    Obstructive sleep apnea G47.33       Isolation/Infection:   Isolation            No Isolation          Patient Infection Status       None to display            Nurse Assessment:  Last Vital Signs: /70   Pulse 90   Temp 98.6 °F (37 °C) (Oral)   Resp 17   Ht 1.702 m (5' 7\")   Wt 101.7 kg (224 lb 3.3 oz)   SpO2 98%   BMI 35.12 kg/m²     Last documented pain score (0-10 scale): Pain Level: 1  Last Weight:   Wt Readings from Last 1 Encounters:   06/04/24 101.7 kg (224 lb 3.3 oz)     Mental Status:  {IP PT MENTAL STATUS:20030}    IV Access:  { MIKE IV ACCESS:401658929}    Nursing Mobility/ADLs:  Walking   {CHP DME ADLs:347519059}  Transfer  {CHP DME ADLs:476009646}  Bathing  {CHP DME ADLs:411267517}  Dressing  {CHP DME ADLs:522889158}  Toileting  {CHP DME ADLs:220479404}  Feeding  {CHP DME ADLs:557312265}  Med Admin  {P DME ADLs:365537105}  Med Delivery   { MIKE MED Delivery:269707709}    Wound Care Documentation and Therapy:  Incision 05/26/24 Pretibial Proximal;Right (Active)   Wound Image   05/27/24 1346   Dressing Status Clean;Intact;Dry 06/03/24 2105   Incision Cleansed Not Cleansed 06/03/24 2105   Dressing/Treatment Gauze dressing/dressing sponge;Tegaderm/transparent film dressing 06/03/24 2105   Incision Length (cm) 16 05/27/24 1346   Incision Width (cm) 0.2 cm 05/27/24 1346   Closure Other (Comment) 06/03/24 2105   Margins Other (Comment) 06/03/24 2105   Incision Assessment Other (Comment) 06/03/24 2105   Drainage Amount None (dry) 06/03/24 2105   Drainage Description Other (Comment) 06/03/24 2105   Odor None 06/03/24 2105   Tamara-incision Assessment Edematous 06/03/24 2105   Number of days: 8        Elimination:  Continence:   Bowel: {YES / NO:18352}  Bladder: {YES / NO:19727}  Urinary Catheter: {Urinary Catheter:733532875}   Colostomy/Ileostomy/Ileal Conduit: {YES / NO:19727}       Date of Last BM: ***    Intake/Output Summary

## 2024-06-04 NOTE — PROGRESS NOTES
[facility]  INPATIENT PHYSICAL THERAPY  [facility] - 8K-07/007-A  Progress Note    Time In: 1100  Time Out: 1200  Timed Code Treatment Minutes: 60 Minutes  Minutes: 60          Date: 2024  Patient Name: Jake Dwyer,  Gender:  male        MRN: [id number]  : 1935  (89 y.o.)  Referral Date : 24  Referring Practitioner: Quintin Boudreaux MD  Diagnosis: S/P total knee arthroplasty, right  Additional Pertinent Hx: Per H&P:  The patient says he began having intermittent right knee buckling/collapse and occasional mild right knee pain started about 1 year ago gradually.  The symptoms progressively worsen and causing abnormal gait and some walking difficulty.  Therefore he saw his family physician more than 1 months ago for the problem.  Lumbar spine abnormality was suspected at the time.  X-ray and later CT of the lumbar spine later was done and he was discovered to have lumbar spinal stenosis.  Initially his PCP wanted to refer him to see orthospine surgeon at [facility] ([facility]) but he requested to see orthopedic spine surgeon who he knew in [place] (? [facility]).  He later went for the scheduled visit with orthospine surgeon in [place] but was found to have right lower extremity \"bowing\" at the knee.  Therefore he saw orthopedic surgeon, Dr. Elan Wilson instead.  X-ray of right knee was done at the time and he was diagnosed of having severe right knee tricompartment osteoarthritis.  He was advised to consider right knee replacement surgery.  Therefore the patient was admitted to [facility] on 2024.  He then underwent right total knee arthroplasty surgery by Dr. Elan Wilson on 2024 at [facility].  He was allowed to weightbearing as tolerated on right lower extremity postoperatively.  On 2024 the patient exhibited symptomatic orthostatic hypotension during the  SEE LTG  Short Term Goal 4: Patient will ascend/descend 1, 4\" step in the parallel bars with CGA to progress towards safe home entry. GOAL NOT MET  Short Term Goal 5: Patient will increase right knee AROM to 90 degrees to reduce pain and increase ease with transfers. GOAL MET, SEE LTG          Long Term Goals  Time Frame for Long Term Goals : 3 weeks  Long Term Goal 1: Patient will complete supine < > sit with modified independence to transfer in and out of bed safely. GOAL NOT MET  Long Term Goal 2: Patient will complete sit < > stand with modified independence to stand to ambulate safely. GOAL MET  Long Term Goal 3: Patient will complete bed < > chair transfer with a RW with modified independence transfer surface to surface safely. GOAL NOT MET  Long Term Goal 4: Patient will ambulate 150' with a RW with modified independence to navigate home safely. GOAL NOT MET  Long Term Goal 5: Patient will ascend/descend 3 steps with 1 hand rail and SBA to access/leave home. GOAL NOT MET  Long term goal 6: Patient complete a car transfer with modified independence to transfer in and out of vehicle safely. GOAL NOT MET      Following session, patient left supine in bed with bed alarm activated. Call light and bedside table are within reach. Nursing staff is aware of pt location and increased BP reading in supine.

## 2024-06-05 LAB — GLUCOSE BLD STRIP.AUTO-MCNC: 151 MG/DL (ref 70–108)

## 2024-06-05 PROCEDURE — 97110 THERAPEUTIC EXERCISES: CPT

## 2024-06-05 PROCEDURE — 99232 SBSQ HOSP IP/OBS MODERATE 35: CPT | Performed by: PHYSICAL MEDICINE & REHABILITATION

## 2024-06-05 PROCEDURE — 1180000000 HC REHAB R&B

## 2024-06-05 PROCEDURE — 97530 THERAPEUTIC ACTIVITIES: CPT

## 2024-06-05 PROCEDURE — 6370000000 HC RX 637 (ALT 250 FOR IP): Performed by: FAMILY MEDICINE

## 2024-06-05 PROCEDURE — 97116 GAIT TRAINING THERAPY: CPT

## 2024-06-05 PROCEDURE — 97535 SELF CARE MNGMENT TRAINING: CPT

## 2024-06-05 PROCEDURE — 6370000000 HC RX 637 (ALT 250 FOR IP): Performed by: NURSE PRACTITIONER

## 2024-06-05 PROCEDURE — 82948 REAGENT STRIP/BLOOD GLUCOSE: CPT

## 2024-06-05 PROCEDURE — 6370000000 HC RX 637 (ALT 250 FOR IP): Performed by: PHYSICAL MEDICINE & REHABILITATION

## 2024-06-05 RX ORDER — CYCLOBENZAPRINE HCL 5 MG
5 TABLET ORAL 3 TIMES DAILY PRN
Qty: 90 TABLET | Refills: 2 | Status: SHIPPED | OUTPATIENT
Start: 2024-06-05

## 2024-06-05 RX ORDER — SENNOSIDES A AND B 8.6 MG/1
2 TABLET, FILM COATED ORAL NIGHTLY
Qty: 60 TABLET | Refills: 3 | Status: SHIPPED | OUTPATIENT
Start: 2024-06-05

## 2024-06-05 RX ORDER — OXYCODONE HYDROCHLORIDE 5 MG/1
2.5-5 TABLET ORAL EVERY 6 HOURS PRN
Qty: 28 TABLET | Refills: 0 | Status: SHIPPED | OUTPATIENT
Start: 2024-06-05 | End: 2024-06-12

## 2024-06-05 RX ORDER — TRAZODONE HYDROCHLORIDE 50 MG/1
50 TABLET ORAL NIGHTLY
Qty: 30 TABLET | Refills: 3 | Status: SHIPPED | OUTPATIENT
Start: 2024-06-05

## 2024-06-05 RX ORDER — PREGABALIN 25 MG/1
25 CAPSULE ORAL EVERY 12 HOURS
Qty: 60 CAPSULE | Refills: 1 | Status: SHIPPED | OUTPATIENT
Start: 2024-06-05 | End: 2024-08-04

## 2024-06-05 RX ORDER — MULTIVITAMIN WITH IRON
1 TABLET ORAL DAILY
Qty: 30 TABLET | Refills: 3 | Status: SHIPPED | OUTPATIENT
Start: 2024-06-06

## 2024-06-05 RX ORDER — CALCIUM CARBONATE 500(1250)
500 TABLET ORAL 2 TIMES DAILY WITH MEALS
Qty: 60 TABLET | Refills: 3 | Status: SHIPPED | OUTPATIENT
Start: 2024-06-05

## 2024-06-05 RX ORDER — LANOLIN ALCOHOL/MO/W.PET/CERES
6 CREAM (GRAM) TOPICAL NIGHTLY
Qty: 60 TABLET | Refills: 3 | Status: SHIPPED | OUTPATIENT
Start: 2024-06-05

## 2024-06-05 RX ORDER — PANTOPRAZOLE SODIUM 40 MG/1
40 TABLET, DELAYED RELEASE ORAL DAILY
Qty: 30 TABLET | Refills: 3 | Status: SHIPPED | OUTPATIENT
Start: 2024-06-06

## 2024-06-05 RX ORDER — POLYETHYLENE GLYCOL 3350 17 G/17G
17 POWDER, FOR SOLUTION ORAL DAILY PRN
Qty: 527 G | Refills: 3 | Status: SHIPPED | OUTPATIENT
Start: 2024-06-05

## 2024-06-05 RX ADMIN — Medication 20 MG: at 20:27

## 2024-06-05 RX ADMIN — CALCIUM 500 MG: 500 TABLET ORAL at 17:32

## 2024-06-05 RX ADMIN — PREGABALIN 25 MG: 25 CAPSULE ORAL at 08:16

## 2024-06-05 RX ADMIN — MAGNESIUM HYDROXIDE 15 ML: 1200 LIQUID ORAL at 08:16

## 2024-06-05 RX ADMIN — TRAZODONE HYDROCHLORIDE 50 MG: 50 TABLET ORAL at 20:26

## 2024-06-05 RX ADMIN — DOCUSATE SODIUM 100 MG: 100 CAPSULE, LIQUID FILLED ORAL at 14:50

## 2024-06-05 RX ADMIN — DOCUSATE SODIUM 100 MG: 100 CAPSULE, LIQUID FILLED ORAL at 20:26

## 2024-06-05 RX ADMIN — CLOPIDOGREL BISULFATE 75 MG: 75 TABLET ORAL at 08:15

## 2024-06-05 RX ADMIN — PREGABALIN 25 MG: 25 CAPSULE ORAL at 20:26

## 2024-06-05 RX ADMIN — METFORMIN HYDROCHLORIDE 1000 MG: 500 TABLET, EXTENDED RELEASE ORAL at 08:15

## 2024-06-05 RX ADMIN — DOXAZOSIN 4 MG: 4 TABLET ORAL at 20:26

## 2024-06-05 RX ADMIN — SACUBITRIL AND VALSARTAN 1 TABLET: 24; 26 TABLET, FILM COATED ORAL at 08:16

## 2024-06-05 RX ADMIN — OXYCODONE 5 MG: 5 TABLET ORAL at 06:15

## 2024-06-05 RX ADMIN — Medication 1000 UNITS: at 08:17

## 2024-06-05 RX ADMIN — ISOSORBIDE MONONITRATE 30 MG: 30 TABLET, EXTENDED RELEASE ORAL at 08:16

## 2024-06-05 RX ADMIN — METOPROLOL TARTRATE 12.5 MG: 25 TABLET, FILM COATED ORAL at 08:16

## 2024-06-05 RX ADMIN — APIXABAN 5 MG: 5 TABLET, FILM COATED ORAL at 08:15

## 2024-06-05 RX ADMIN — ACETAMINOPHEN 650 MG: 325 TABLET ORAL at 14:50

## 2024-06-05 RX ADMIN — Medication 1 TABLET: at 08:16

## 2024-06-05 RX ADMIN — CYCLOBENZAPRINE 5 MG: 10 TABLET, FILM COATED ORAL at 06:15

## 2024-06-05 RX ADMIN — ASPIRIN 81 MG: 81 TABLET, COATED ORAL at 08:15

## 2024-06-05 RX ADMIN — CALCIUM 500 MG: 500 TABLET ORAL at 08:15

## 2024-06-05 RX ADMIN — ACETAMINOPHEN 650 MG: 325 TABLET ORAL at 20:26

## 2024-06-05 RX ADMIN — APIXABAN 5 MG: 5 TABLET, FILM COATED ORAL at 20:26

## 2024-06-05 RX ADMIN — FUROSEMIDE 20 MG: 20 TABLET ORAL at 08:15

## 2024-06-05 RX ADMIN — METOPROLOL TARTRATE 12.5 MG: 25 TABLET, FILM COATED ORAL at 20:26

## 2024-06-05 RX ADMIN — OXYCODONE 5 MG: 5 TABLET ORAL at 20:31

## 2024-06-05 RX ADMIN — SENNOSIDES 17.2 MG: 8.6 TABLET, FILM COATED ORAL at 20:26

## 2024-06-05 RX ADMIN — ACETAMINOPHEN 650 MG: 325 TABLET ORAL at 06:14

## 2024-06-05 RX ADMIN — OXYCODONE 5 MG: 5 TABLET ORAL at 12:39

## 2024-06-05 RX ADMIN — METFORMIN HYDROCHLORIDE 500 MG: 500 TABLET, EXTENDED RELEASE ORAL at 17:32

## 2024-06-05 RX ADMIN — DOCUSATE SODIUM 100 MG: 100 CAPSULE, LIQUID FILLED ORAL at 08:15

## 2024-06-05 RX ADMIN — POLYETHYLENE GLYCOL 3350 17 G: 17 POWDER, FOR SOLUTION ORAL at 08:21

## 2024-06-05 RX ADMIN — SACUBITRIL AND VALSARTAN 1 TABLET: 24; 26 TABLET, FILM COATED ORAL at 20:26

## 2024-06-05 RX ADMIN — NALOXEGOL OXALATE 25 MG: 25 TABLET, FILM COATED ORAL at 06:14

## 2024-06-05 RX ADMIN — PANTOPRAZOLE SODIUM 40 MG: 40 TABLET, DELAYED RELEASE ORAL at 08:16

## 2024-06-05 RX ADMIN — Medication 6 MG: at 20:26

## 2024-06-05 ASSESSMENT — PAIN SCALES - GENERAL
PAINLEVEL_OUTOF10: 7
PAINLEVEL_OUTOF10: 3
PAINLEVEL_OUTOF10: 2
PAINLEVEL_OUTOF10: 0
PAINLEVEL_OUTOF10: 2
PAINLEVEL_OUTOF10: 0
PAINLEVEL_OUTOF10: 6

## 2024-06-05 ASSESSMENT — PAIN DESCRIPTION - LOCATION
LOCATION: KNEE

## 2024-06-05 ASSESSMENT — PAIN DESCRIPTION - ORIENTATION
ORIENTATION: RIGHT

## 2024-06-05 ASSESSMENT — PAIN - FUNCTIONAL ASSESSMENT
PAIN_FUNCTIONAL_ASSESSMENT: ACTIVITIES ARE NOT PREVENTED

## 2024-06-05 ASSESSMENT — PAIN DESCRIPTION - DESCRIPTORS
DESCRIPTORS: ACHING
DESCRIPTORS: ACHING
DESCRIPTORS: ACHING;DISCOMFORT
DESCRIPTORS: ACHING

## 2024-06-05 ASSESSMENT — PAIN DESCRIPTION - FREQUENCY: FREQUENCY: INTERMITTENT

## 2024-06-05 ASSESSMENT — PAIN DESCRIPTION - ONSET: ONSET: ON-GOING

## 2024-06-05 ASSESSMENT — PAIN DESCRIPTION - PAIN TYPE
TYPE: SURGICAL PAIN
TYPE: SURGICAL PAIN

## 2024-06-05 NOTE — PLAN OF CARE
Problem: Discharge Planning  Goal: Discharge to home or other facility with appropriate resources  6/5/2024 1449 by Soledad Ward LISW  Note: Transportation:   Has transportation kept you from medical appointments, meetings, work, or from getting things needed for daily living? (Check all that apply)  No.      Health Literacy:   How often do you need to have someone help you when you read instructions, pamphlets, or other written material from your doctor or pharmacy?  0. - Never    Social Isolation:  How often do you feel lonely or isolated from those around you?  0. Never      Patient Mood Interview (PHQ-2 to 9) (from [employer]©)   Say to Patient: \"Over the last 2 weeks, have you been bothered by any of the following problems?\"   If symptom is present, enter 1 (yes) in column 1 (Symptom Presence)  If yes in column 1, then ask the patient: “About how often have you been bothered by this?”  Read and show the patient a card with the symptom frequency choices.    Indicate response in column 2, Symptom Frequency.   Symptom Presence  No (enter 0 in column 2)   Yes (enter 0-3 in column 2)  9.    No response (leave column 2 blank) Symptom Frequency  Never or 1 day  2-6 days (several days)  7-11 days (half or more of the days)  12-14 days (nearly every day    Symptom Presence Symptom Frequency   Little interest or pleasure in doing things 0. No 0. Never or 1 day   Feeling down, depressed, or hopeless 0. No 0. Never or 1 day

## 2024-06-05 NOTE — PROGRESS NOTES
Patient: Jake Dwyer  Unit/Bed: 8K-07/007-A  YOB: 1935  MRN: [id number] Acct: [id number]   Admitting Diagnosis: History of total knee arthroplasty [Z96.659]  S/P total knee arthroplasty, right [Z96.651]  Admit Date:  5/25/2024  Hospital Day: 11    Assessment:     Principal Problem:    S/P total knee arthroplasty, right  Active Problems:    Debility    Orthostatic hypotension    Class 1 obesity in adult    Primary hypertension    Mixed hyperlipidemia    Controlled type 2 diabetes mellitus with hyperglycemia (HCC)    Right leg swelling    Chronic atrial fibrillation (HCC)    Macular degeneration of left eye    Vision loss, right eye    Obstructive sleep apnea  Resolved Problems:    * No resolved hospital problems. *      Plan:     We reviewed the CS being acceptable.  The BP is low and the staff and I encouraged more PO intake.  The sodium was low today and will check again tomorrow.        Subjective:     Patient has no complaint of CP or SOB..   Medication side effects: none    Scheduled Meds:   sacubitril-valsartan  1 tablet Oral BID    traZODone  50 mg Oral Nightly    naloxegol  25 mg Oral QAM AC    docusate sodium  100 mg Oral TID    metFORMIN  500 mg Oral Dinner    metFORMIN  1,000 mg Oral Daily with breakfast    simvastatin  20 mg Oral Nightly    magnesium hydroxide  15 mL Oral Daily    pregabalin  25 mg Oral Q12H    polyethylene glycol  17 g Oral Daily    metoprolol tartrate  12.5 mg Oral Q12H    calcium elemental  500 mg Oral BID WC    apixaban  5 mg Oral BID    doxazosin  4 mg Oral Nightly    pantoprazole  40 mg Oral Daily    aspirin  81 mg Oral Daily    clopidogrel  75 mg Oral Daily    furosemide  20 mg Oral Daily    isosorbide mononitrate  30 mg Oral Daily    senna  2 tablet Oral Nightly    acetaminophen  650 mg Oral Q6H    multivitamin  1 tablet Oral Daily    Vitamin D  1,000 Units Oral Daily    melatonin  6 mg Oral Nightly     Continuous Infusions:  PRN Meds:diclofenac sodium,

## 2024-06-05 NOTE — PROGRESS NOTES
[facility]  INPATIENT PHYSICAL THERAPY  [facility] - 8K-07/007-A  Daily Note    Time In: 1130  Time Out: 1200  Timed Code Treatment Minutes: 30 Minutes  Minutes: 30          Date: 2024  Patient Name: Jake Dwyer,  Gender:  male        MRN: [id number]  : 1935  (89 y.o.)  Referral Date : 24  Referring Practitioner: Quintin Boudreaux MD  Diagnosis: S/P total knee arthroplasty, right  Additional Pertinent Hx: Per H&P:  The patient says he began having intermittent right knee buckling/collapse and occasional mild right knee pain started about 1 year ago gradually.  The symptoms progressively worsen and causing abnormal gait and some walking difficulty.  Therefore he saw his family physician more than 1 months ago for the problem.  Lumbar spine abnormality was suspected at the time.  X-ray and later CT of the lumbar spine later was done and he was discovered to have lumbar spinal stenosis.  Initially his PCP wanted to refer him to see orthospine surgeon at [facility] ([facility]) but he requested to see orthopedic spine surgeon who he knew in [place] (? Ortho[facility]uro).  He later went for the scheduled visit with orthospine surgeon in [place] but was found to have right lower extremity \"bowing\" at the knee.  Therefore he saw orthopedic surgeon, Dr. Elan Wilson instead.  X-ray of right knee was done at the time and he was diagnosed of having severe right knee tricompartment osteoarthritis.  He was advised to consider right knee replacement surgery.  Therefore the patient was admitted to [facility] on 2024.  He then underwent right total knee arthroplasty surgery by Dr. Elan Wilson on 2024 at [facility].  He was allowed to weightbearing as tolerated on right lower extremity postoperatively.  On 2024 the patient exhibited symptomatic orthostatic hypotension during the PT      Ambulation:  Supervision  Distance: Pt ambulates 50ft x 3 and 80ft x 1  Surface: Level Tile  Device: Rolling Walker  Gait Deviations:  Decreased Step Length Bilaterally, Decreased Gait Speed, and Increased reliance on walker    Stairs:  None    Balance:  None    Neuromuscular Re-education  None    Exercise:  Seated BLE strengthening:  -LAQ  -HS curls  BTB, 3 x 12 each     Functional Outcome Measures:   Not completed  Modified Hertford Scale:  Not Applicable     ASSESSMENT:  Assessment: Patient progressing toward established goals.  Pt tolerated interventions well and interventions performed without incident. Skilled PT interventions provided for BLE strengthening and gait training with RW. Continued increased reliance on RW during ambulation. Initial verbal cueing for proper hand placement and controled descent when completing stand > sit. Good carryover within session. Continue per plan of care including R knee ROM, global strengthening, gait training and standing balance.   Activity Tolerance:  Patient tolerance of  treatment: good.     Equipment Recommendations:Equipment Needed: No  Discharge Recommendations: Continue to assess pending progress, Patient would benefit from continued therapy after discharge Home with Home Health PT    PLAN: Current Treatment Recommendations: Strengthening, Balance training, Functional mobility training, Transfer training, Endurance training, Gait training, Stair training, Neuromuscular re-education, Home exercise program, Safety education & training, Patient/Caregiver education & training, Equipment evaluation, education, & procurement, Therapeutic activities  General Plan:  (5x/wk 90min)    Patient Education  Patient Education: Plan of Care, Functional Mobility, Reviewed Prior Education, Health Promotion and Wellness Education, Safety, Verbal Exercise Instruction    Goals:  Patient Goals : decrease pain  Short Term Goals  Time Frame for Short Term Goals: 1 week  Short Term Goal

## 2024-06-05 NOTE — PLAN OF CARE
Problem: Discharge Planning  Goal: Discharge to home or other facility with appropriate resources  6/5/2024 0458 by Isabel Jay RN  Outcome: Progressing  6/4/2024 1537 by Soledad Ward LISW  Note: SW spoke with Dulce from [facility]. Dulce reports that they can accept patient for admission to their services for RN, PT, OT and HHA. SW will follow and maintain involvement in discharge planning.      Problem: Chronic Conditions and Co-morbidities  Goal: Patient's chronic conditions and co-morbidity symptoms are monitored and maintained or improved  Outcome: Progressing     Problem: Pain  Goal: Verbalizes/displays adequate comfort level or baseline comfort level  Outcome: Progressing     Problem: Safety - Adult  Goal: Free from fall injury  Outcome: Progressing     Problem: ABCDS Injury Assessment  Goal: Absence of physical injury  Outcome: Progressing     Problem: Respiratory - Adult  Goal: Achieves optimal ventilation and oxygenation  Outcome: Progressing     Problem: Cardiovascular - Adult  Goal: Maintains optimal cardiac output and hemodynamic stability  Outcome: Progressing     Problem: Skin/Tissue Integrity - Adult  Goal: Skin integrity remains intact  Outcome: Progressing  Goal: Incisions, wounds, or drain sites healing without S/S of infection  Outcome: Progressing     Problem: Musculoskeletal - Adult  Goal: Return mobility to safest level of function  Outcome: Progressing     Problem: Nutrition Deficit:  Goal: Optimize nutritional status  Outcome: Progressing     Problem: Skin/Tissue Integrity  Goal: Absence of new skin breakdown  Description: 1.  Monitor for areas of redness and/or skin breakdown  2.  Assess vascular access sites hourly  3.  Every 4-6 hours minimum:  Change oxygen saturation probe site  4.  Every 4-6 hours:  If on nasal continuous positive airway pressure, respiratory therapy assess nares and determine need for appliance change or resting period.  Outcome:  Progressing

## 2024-06-05 NOTE — DISCHARGE SUMMARY
Deviations:  Decreased Step Length Bilaterally, Decreased Gait Speed, and Increased reliance on walker    Independent, Supervision  Distance: 20ft x 1 and 70ft x 1  Surface: Level Tile  Device: Rolling Walker  Gait Deviations:  Decreased Step Length Bilaterally, Decreased Weight Shift Right, Decreased Gait Speed, and Increased reliance on walker  Pt is IND with use of RW 20 or less and requires supervision for increased distances due to decreased endurance. WC follow.      Supervision  Distance: 10ft  Surface: Uneven Surface  Device:Rolling Walker  Gait Deviations:  Decreased Step Length Bilaterally, Decreased Weight Shift Right, Decreased Gait Speed, and Increased Reliance on Rolling Walker     Stairs:  Pt ascends/descends 12 6in steps with use of BHR and supervision. Able to complete <=4 with IND and use of HR  Pt ascends/descends curb step x 1 with use of RW and CGA.   Pt ascends leading with LLE and descends leading with RLE.      Balance:  Pt. Able to  an object from floor using long handled reacher and Rolling walker for support with Modified Independent    PT Equipment Recommendations  Equipment Needed: No, Assessment: Patient is a 89 year old s/p right TKR.  Patient was independent at [facility] with use of a rollator/RW.  At this time, patient requires moderate assistance to complete sit to stand transfers, min assist to complete bed mobility and CGA to ambulate short distances with a RW.  Patient is limited by right knee pain and decreased ROM.  Mr Dwyer will benefit from skilled PT services to improve his ability to complete functional mobility, reduce his risk for fals and allow patient to return to home safely.  Physical Therapy:  ROLLING LEFT AND RIGHT: Independent       SIT TO SIDELYING:  Independent      SIDELYING TO SIT: Independent      SIT TO STAND:Independent       CHAIR TO BED TRANSFER:Independent       CAR TRANSFER:Supervision or touching assistance      WALK 10 FEET:Independent        WALK    diclofenac sodium (VOLTAREN) 1 % GEL Apply 2 g topically 4 times daily as needed for Pain  Qty: 150 g, Refills: 3      polyethylene glycol (GLYCOLAX) 17 g packet Take 1 packet by mouth daily as needed for Constipation  Qty: 527 g, Refills: 3      senna (SENOKOT) 8.6 MG tablet Take 2 tablets by mouth nightly ; hold if diarrhea  Qty: 60 tablet, Refills: 3      melatonin 3 MG TABS tablet Take 2 tablets by mouth at bedtime For sleep  Qty: 60 tablet, Refills: 3      calcium elemental (OSCAL) 500 MG TABS tablet Take 1 tablet by mouth 2 times daily (with meals)  Qty: 60 tablet, Refills: 3      Multiple Vitamin (MULTIVITAMIN) TABS tablet Take 1 tablet by mouth daily  Qty: 30 tablet, Refills: 3      cyclobenzaprine (FLEXERIL) 5 MG tablet Take 1 tablet by mouth 3 times daily as needed for Muscle spasms  Qty: 90 tablet, Refills: 2      pantoprazole (PROTONIX) 40 MG tablet Take 1 tablet by mouth daily  Qty: 30 tablet, Refills: 3                Details   metoprolol tartrate (LOPRESSOR) 25 MG tablet Take 0.5 tablets by mouth in the morning and 0.5 tablets in the evening. ; hold if systolic blood pressure is less 110.  Qty: 60 tablet, Refills: 3                Details   glimepiride (AMARYL) 2 MG tablet Take 1 tablet by mouth at bedtime      Omega-3 Fatty Acids (FISH OIL PO) Take by mouth daily      Ascorbic Acid (VITAMIN C PO) Take by mouth daily      ZINC PO Take by mouth daily      Misc Natural Products (NF FORMULAS TESTOSTERONE PO) Take by mouth daily      Ibuprofen-diphenhydrAMINE Cit (ADVIL PM PO) Take by mouth nightly as needed      furosemide (LASIX) 20 MG tablet Take 1 tablet by mouth daily      clopidogrel (PLAVIX) 75 MG tablet Take 1 tablet by mouth daily      apixaban (ELIQUIS) 5 MG TABS tablet Take 1 tablet by mouth 2 times daily      magnesium oxide (MAG-OX) 400 MG tablet Take 1 tablet by mouth daily      Cholecalciferol (VITAMIN D3) 1.25 MG (40327 UT) CAPS Take 1 capsule by mouth daily      vitamin B-12

## 2024-06-05 NOTE — PROGRESS NOTES
[facility]  Occupational Therapy  Daily Note  Time:   Time In: 0900  Time Out: 1030  Timed Code Treatment Minutes: 90 Minutes  Minutes: 90      Date: 2024  Patient Name: Jake Dwyer,   Gender: male      Room: [facility]07/007-A  MRN: [id number]  : 1935  (89 y.o.)  Referring Practitioner: Quintin Boudreaux MD  Diagnosis: s/p total knee arthroscopy  Additional Pertinent Hx: Jake Dwyer is a 89 y.o. 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 on 2024 for intensive inpatient rehabilitation treatment of impairment and disability after right total knee arthroplasty surgery by Dr. Elan Wilson on 2024.  He was allowed to weightbearing as tolerated on right lower extremity postoperatively.  On 2024 the patient exhibited symptomatic orthostatic hypotension during the PT therapy session.  He was treated with IV fluid and holding parameter was placed on his antihypertensive medications.    Restrictions/Precautions:  Restrictions/Precautions: Fall Risk, Up as Tolerated, Weight Bearing  Right Lower Extremity Weight Bearing: Weight Bearing As Tolerated  Position Activity Restriction  Other

## 2024-06-05 NOTE — DISCHARGE INSTR - MEDS
Check blood pressure before taking medications for hypertension (Imdur, Cardura, Entresto, Lasix, Lopressor)  Hold Entresto 24-26 (sacubitril-valsartan), Lasix (furosemide) if systolic blood pressure less than 120  Hold Imdur (isosorbide mononitrate), Cardura (doxazosin), Lopressor (metoprolol tartrate) if systolic pressure less than 110

## 2024-06-05 NOTE — PLAN OF CARE
Problem: Discharge Planning  Goal: Discharge to home or other facility with appropriate resources  6/5/2024 1056 by Dulce Pinzon LPN  Outcome: Progressing  Flowsheets (Taken 6/5/2024 1056)  Discharge to home or other facility with appropriate resources:   Identify barriers to discharge with patient and caregiver   Identify discharge learning needs (meds, wound care, etc)   Refer to discharge planning if patient needs post-hospital services based on physician order or complex needs related to functional status, cognitive ability or social support system   Arrange for needed discharge resources and transportation as appropriate  Note: Discharge home 06/06/24 6/5/2024 0458 by Isabel Jay RN  Outcome: Progressing     Problem: Chronic Conditions and Co-morbidities  Goal: Patient's chronic conditions and co-morbidity symptoms are monitored and maintained or improved  6/5/2024 1056 by Dulce Pinzon LPN  Outcome: Progressing  Flowsheets (Taken 6/5/2024 1056)  Care Plan - Patient's Chronic Conditions and Co-Morbidity Symptoms are Monitored and Maintained or Improved: Monitor and assess patient's chronic conditions and comorbid symptoms for stability, deterioration, or improvement  6/5/2024 0458 by Isabel Jay, RN  Outcome: Progressing     Problem: Pain  Goal: Verbalizes/displays adequate comfort level or baseline comfort level  6/5/2024 1056 by Dulce Pinzon LPN  Outcome: Progressing  Flowsheets (Taken 6/5/2024 1056)  Verbalizes/displays adequate comfort level or baseline comfort level:   Encourage patient to monitor pain and request assistance   Assess pain using appropriate pain scale   Administer analgesics based on type and severity of pain and evaluate response  6/5/2024 0458 by Isabel Jay, RN  Outcome: Progressing     Problem: Safety - Adult  Goal: Free from fall injury  6/5/2024 1056 by Dulce Pinzon LPN  Outcome: Progressing  Flowsheets (Taken 6/5/2024 1056)  Free From  LPN  Outcome: Progressing  Flowsheets (Taken 6/5/2024 1056)  Return Mobility to Safest Level of Function: Assess patient stability and activity tolerance for standing, transferring and ambulating with or without assistive devices  6/5/2024 0458 by Isabel Jay RN  Outcome: Progressing     Problem: Nutrition Deficit:  Goal: Optimize nutritional status  6/5/2024 1056 by Dulce Pinzon LPN  Outcome: Progressing  Flowsheets (Taken 6/5/2024 1056)  Nutrient intake appropriate for improving, restoring, or maintaining nutritional needs:   Assess nutritional status and recommend course of action   Monitor oral intake, labs, and treatment plans  6/5/2024 0458 by Isabel Jay, RN  Outcome: Progressing     Problem: Skin/Tissue Integrity  Goal: Absence of new skin breakdown  Description: 1.  Monitor for areas of redness and/or skin breakdown  2.  Assess vascular access sites hourly  3.  Every 4-6 hours minimum:  Change oxygen saturation probe site  4.  Every 4-6 hours:  If on nasal continuous positive airway pressure, respiratory therapy assess nares and determine need for appliance change or resting period.  6/5/2024 1056 by Dulce Pinzon LPN  Outcome: Progressing  6/5/2024 0458 by Isabel Jay, RN  Outcome: Progressing

## 2024-06-05 NOTE — PLAN OF CARE
Problem: Discharge Planning  Goal: Discharge to home or other facility with appropriate resources  6/5/2024 1109 by Soledad Ward LISW  Note: Team conference held Wednesday, 6/5. Recommendations of the team were explained to the patient by Dr Boudreaux and SW. Team is recommending that patient continue on acute inpatient rehab for PT and OT, with expected discharge date of Thursday, 6/6. Following discharge, team is recommending home health services for RN, PT, OT and HHA. Care plan reviewed with patient. Patient verbalized understanding of the plan of care and contributed to goal setting. SW to follow and maintain involvement in discharge planning.

## 2024-06-05 NOTE — PROGRESS NOTES
[facility]  INPATIENT PHYSICAL THERAPY  [facility] - 8K-07/007-A  Daily Note    Time In: 1337  Time Out: 1437  Timed Code Treatment Minutes: 60 Minutes  Minutes: 60          Date: 2024  Patient Name: Jake Dwyer,  Gender:  male        MRN: [id number]  : 1935  (89 y.o.)  Referral Date : 24  Referring Practitioner: Quintin Boudreaux MD  Diagnosis: S/P total knee arthroplasty, right  Additional Pertinent Hx: Per H&P:  The patient says he began having intermittent right knee buckling/collapse and occasional mild right knee pain started about 1 year ago gradually.  The symptoms progressively worsen and causing abnormal gait and some walking difficulty.  Therefore he saw his family physician more than 1 months ago for the problem.  Lumbar spine abnormality was suspected at the time.  X-ray and later CT of the lumbar spine later was done and he was discovered to have lumbar spinal stenosis.  Initially his PCP wanted to refer him to see orthospine surgeon at [facility] ([facility]) but he requested to see orthopedic spine surgeon who he knew in [place] (? [facility]).  He later went for the scheduled visit with orthospine surgeon in [place] but was found to have right lower extremity \"bowing\" at the knee.  Therefore he saw orthopedic surgeon, Dr. Elan Wilson instead.  X-ray of right knee was done at the time and he was diagnosed of having severe right knee tricompartment osteoarthritis.  He was advised to consider right knee replacement surgery.  Therefore the patient was admitted to [facility] on 2024.  He then underwent right total knee arthroplasty surgery by Dr. Elan Wilson on 2024 at [facility].  He was allowed to weightbearing as tolerated on right lower extremity postoperatively.  On 2024 the patient exhibited symptomatic orthostatic hypotension during the PT

## 2024-06-05 NOTE — PROGRESS NOTES
Jake Dwyer was evaluated today and a DME order was entered for a wheeled walker because he requires this to successfully complete daily living tasks of personal cares, ambulating, and meal preparation.  A wheeled walker is necessary due to the patient's unsteady gait, upper body weakness, and inability to  an ambulation device; and he can ambulate only by pushing a walker instead of a lesser assistive device such as a cane, crutch, or standard walker.  The need for this equipment was discussed with the patient and he understands and is in agreement.    Faby Villalobos, PT, DPT

## 2024-06-06 VITALS
SYSTOLIC BLOOD PRESSURE: 116 MMHG | TEMPERATURE: 97.6 F | BODY MASS INDEX: 35.05 KG/M2 | OXYGEN SATURATION: 98 % | HEIGHT: 67 IN | HEART RATE: 83 BPM | DIASTOLIC BLOOD PRESSURE: 64 MMHG | WEIGHT: 223.33 LBS | RESPIRATION RATE: 18 BRPM

## 2024-06-06 LAB
ANION GAP SERPL CALC-SCNC: 10 MEQ/L (ref 8–16)
BUN SERPL-MCNC: 16 MG/DL (ref 7–22)
CALCIUM SERPL-MCNC: 8.7 MG/DL (ref 8.5–10.5)
CHLORIDE SERPL-SCNC: 96 MEQ/L (ref 98–111)
CO2 SERPL-SCNC: 26 MEQ/L (ref 23–33)
CREAT SERPL-MCNC: 0.7 MG/DL (ref 0.4–1.2)
GFR SERPL CREATININE-BSD FRML MDRD: 88 ML/MIN/1.73M2
GLUCOSE BLD STRIP.AUTO-MCNC: 153 MG/DL (ref 70–108)
GLUCOSE SERPL-MCNC: 139 MG/DL (ref 70–108)
POTASSIUM SERPL-SCNC: 4.8 MEQ/L (ref 3.5–5.2)
SODIUM SERPL-SCNC: 132 MEQ/L (ref 135–145)

## 2024-06-06 PROCEDURE — 80048 BASIC METABOLIC PNL TOTAL CA: CPT

## 2024-06-06 PROCEDURE — 99239 HOSP IP/OBS DSCHRG MGMT >30: CPT | Performed by: PHYSICAL MEDICINE & REHABILITATION

## 2024-06-06 PROCEDURE — 82948 REAGENT STRIP/BLOOD GLUCOSE: CPT

## 2024-06-06 PROCEDURE — 97530 THERAPEUTIC ACTIVITIES: CPT

## 2024-06-06 PROCEDURE — 6370000000 HC RX 637 (ALT 250 FOR IP): Performed by: FAMILY MEDICINE

## 2024-06-06 PROCEDURE — 6370000000 HC RX 637 (ALT 250 FOR IP): Performed by: NURSE PRACTITIONER

## 2024-06-06 PROCEDURE — 6370000000 HC RX 637 (ALT 250 FOR IP): Performed by: PHYSICAL MEDICINE & REHABILITATION

## 2024-06-06 PROCEDURE — 36415 COLL VENOUS BLD VENIPUNCTURE: CPT

## 2024-06-06 PROCEDURE — 97535 SELF CARE MNGMENT TRAINING: CPT

## 2024-06-06 RX ADMIN — METFORMIN HYDROCHLORIDE 1000 MG: 500 TABLET, EXTENDED RELEASE ORAL at 07:40

## 2024-06-06 RX ADMIN — APIXABAN 5 MG: 5 TABLET, FILM COATED ORAL at 07:46

## 2024-06-06 RX ADMIN — DOCUSATE SODIUM 100 MG: 100 CAPSULE, LIQUID FILLED ORAL at 07:41

## 2024-06-06 RX ADMIN — PREGABALIN 25 MG: 25 CAPSULE ORAL at 07:43

## 2024-06-06 RX ADMIN — PANTOPRAZOLE SODIUM 40 MG: 40 TABLET, DELAYED RELEASE ORAL at 07:41

## 2024-06-06 RX ADMIN — OXYCODONE 5 MG: 5 TABLET ORAL at 11:13

## 2024-06-06 RX ADMIN — METOPROLOL TARTRATE 12.5 MG: 25 TABLET, FILM COATED ORAL at 07:43

## 2024-06-06 RX ADMIN — NALOXEGOL OXALATE 25 MG: 25 TABLET, FILM COATED ORAL at 06:02

## 2024-06-06 RX ADMIN — MAGNESIUM HYDROXIDE 15 ML: 1200 LIQUID ORAL at 07:43

## 2024-06-06 RX ADMIN — ASPIRIN 81 MG: 81 TABLET, COATED ORAL at 07:41

## 2024-06-06 RX ADMIN — ISOSORBIDE MONONITRATE 30 MG: 30 TABLET, EXTENDED RELEASE ORAL at 07:41

## 2024-06-06 RX ADMIN — POLYETHYLENE GLYCOL 3350 17 G: 17 POWDER, FOR SOLUTION ORAL at 07:41

## 2024-06-06 RX ADMIN — CLOPIDOGREL BISULFATE 75 MG: 75 TABLET ORAL at 07:41

## 2024-06-06 RX ADMIN — CALCIUM 500 MG: 500 TABLET ORAL at 07:41

## 2024-06-06 RX ADMIN — Medication 1 TABLET: at 07:40

## 2024-06-06 RX ADMIN — DICLOFENAC SODIUM 2 G: 10 GEL TOPICAL at 07:23

## 2024-06-06 RX ADMIN — ACETAMINOPHEN 650 MG: 325 TABLET ORAL at 06:02

## 2024-06-06 RX ADMIN — Medication 1000 UNITS: at 07:41

## 2024-06-06 ASSESSMENT — PAIN - FUNCTIONAL ASSESSMENT
PAIN_FUNCTIONAL_ASSESSMENT: ACTIVITIES ARE NOT PREVENTED

## 2024-06-06 ASSESSMENT — PAIN DESCRIPTION - LOCATION
LOCATION: KNEE
LOCATION: LEG;KNEE
LOCATION: KNEE

## 2024-06-06 ASSESSMENT — PAIN DESCRIPTION - DESCRIPTORS
DESCRIPTORS: ACHING;DISCOMFORT;THROBBING
DESCRIPTORS: ACHING
DESCRIPTORS: ACHING

## 2024-06-06 ASSESSMENT — PAIN SCALES - GENERAL
PAINLEVEL_OUTOF10: 2
PAINLEVEL_OUTOF10: 7
PAINLEVEL_OUTOF10: 3

## 2024-06-06 ASSESSMENT — PAIN DESCRIPTION - ORIENTATION
ORIENTATION: RIGHT

## 2024-06-06 NOTE — PROGRESS NOTES
PT therapy session.  He was treated with IV fluid and holding parameter was placed on his antihypertensive medications.     Prior Level of Function:  Lives With: Spouse (wife has dementia)  Type of Home: House  Home Layout: Two level, Bed/Bath upstairs  Home Access: Stairs to enter with rails  Entrance Stairs - Number of Steps: 3 VIOLA  Entrance Stairs - Rails: Left  Home Equipment: Walker - 4-Wheeled, Walker - Rolling, Cane   Bathroom Shower/Tub: Walk-in shower  Bathroom Toilet: Handicap height  Bathroom Equipment: Shower chair    ADL Assistance: Independent  Homemaking Assistance: Independent  Homemaking Responsibilities: Yes  Ambulation Assistance: Independent  Transfer Assistance: Independent  Active : No  Additional Comments: Typically ambulates with 4WW. Has RW that stays upstairs. Uses cane to go up and down steps. Indep with ADls and most IADls. 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    Restrictions/Precautions:  Restrictions/Precautions: Fall Risk, Up as Tolerated, Weight Bearing  Right Lower Extremity Weight Bearing: Weight Bearing As Tolerated  Position Activity Restriction  Other position/activity restrictions: monitor orthostatics     SUBJECTIVE: Patient in recliner upon arrival, agreeable to education on HEP. Patient declined completion of exercises or ambulation at this time.     PAIN: denies    Vitals: Vitals not assessed per clinical judgement, see nursing flowsheet    OBJECTIVE:  Bed Mobility:  Not Tested    Transfers:  Not Tested    Ambulation:  Not Tested    Exercise:  **educated on HEP and provided handouts of supine/seated LE exercises. Patient stated understanding. Educated on frequency per day and progression.       Functional Outcome Measures: Not completed     Modified [place] Scale:  Not Applicable    ASSESSMENT:  Assessment: Patient progressing toward established goals.  Activity Tolerance:  Patient tolerance of  treatment: good.      Equipment  Recommendations:Equipment Needed: No  Discharge Recommendations: Home with Home Health PT  Plan: Current Treatment Recommendations: Strengthening, Balance training, Functional mobility training, Transfer training, Endurance training, Gait training, Stair training, Neuromuscular re-education, Home exercise program, Safety education & training, Patient/Caregiver education & training, Equipment evaluation, education, & procurement, Therapeutic activities  General Plan:  (5x/wk 90min)    Education  Education:  Learners: Patient  Patient Education: Plan of Care, Home Exercise Program    Goals:  Patient Goals : decrease pain  Short Term Goals  Time Frame for Short Term Goals: 1 week  Short Term Goal 1: Patient will complete sit < > stand with CGA to stand to ambulate with decreased difficulty.  GOAL MET  Short Term Goal 2: Patient will complete supine < > sit with CGA, head of bed flat, no rails to transfer in and out of bed safely.  GOAL MET  Short Term Goal 3: Patient will ambulate 50' with a RW and CGA to increase mobility and progress towards navigating home safely.  GOAL MET  Short Term Goal 4: Patient will ascend/descend 1, 4\" step in the parallel bars with CGA to progress towards safe home entry.  GOAL MET  Short Term Goal 5: Patient will increase right knee AROM to 90 degrees to reduce pain and increase ease with transfers.  GOAL MET  Long Term Goals  Time Frame for Long Term Goals : 3 weeks  Long Term Goal 1: Patient will complete supine < > sit with modified independence to transfer in and out of bed safely.  GOAL MET  Long Term Goal 2: Patient will complete sit < > stand with modified independence to stand to ambulate safely.  GOAL MET  Long Term Goal 3: Patient will complete bed < > chair transfer with a RW with modified independence transfer surface to surface safely.  GOAL MET  Long Term Goal 4: Patient will ambulate 150' with a RW with modified independence to navigate home safely.  GOAL NOT MET  Long Term

## 2024-06-06 NOTE — PLAN OF CARE
Problem: Discharge Planning  Goal: Discharge to home or other facility with appropriate resources  Outcome: Adequate for Discharge     Problem: Chronic Conditions and Co-morbidities  Goal: Patient's chronic conditions and co-morbidity symptoms are monitored and maintained or improved  Outcome: Adequate for Discharge     Problem: Pain  Goal: Verbalizes/displays adequate comfort level or baseline comfort level  6/6/2024 0930 by Malinda Danielle RN  Outcome: Adequate for Discharge  6/5/2024 2319 by Anny Keane RN  Outcome: Progressing  Flowsheets (Taken 6/5/2024 2319)  Verbalizes/displays adequate comfort level or baseline comfort level:   Encourage patient to monitor pain and request assistance   Assess pain using appropriate pain scale     Problem: Safety - Adult  Goal: Free from fall injury  6/6/2024 0930 by Malinda Danielle RN  Outcome: Adequate for Discharge  6/5/2024 2319 by Anny Keane RN  Outcome: Progressing  Flowsheets (Taken 6/5/2024 2319)  Free From Fall Injury: Instruct family/caregiver on patient safety     Problem: ABCDS Injury Assessment  Goal: Absence of physical injury  6/6/2024 0930 by Malinda Danielle RN  Outcome: Adequate for Discharge  6/5/2024 2319 by Anny Keane RN  Outcome: Progressing  Flowsheets (Taken 6/5/2024 2319)  Absence of Physical Injury: Implement safety measures based on patient assessment     Problem: Respiratory - Adult  Goal: Achieves optimal ventilation and oxygenation  Outcome: Adequate for Discharge     Problem: Cardiovascular - Adult  Goal: Maintains optimal cardiac output and hemodynamic stability  Outcome: Adequate for Discharge     Problem: Skin/Tissue Integrity - Adult  Goal: Skin integrity remains intact  6/6/2024 0930 by Malinda Danielle RN  Outcome: Adequate for Discharge  Flowsheets (Taken 6/5/2024 2320 by Anny Keane RN)  Skin Integrity Remains Intact: Monitor for areas of redness and/or skin breakdown  6/5/2024 2319 by Anny Keane

## 2024-06-06 NOTE — PROGRESS NOTES
[facility]  Inpatient Rehabilitation  Occupational Therapy  Discharge Note  Time:  Time In: 0700  Time Out: 730  Timed Code Treatment Minutes: 30 Minutes  Minutes: 30          Date: 2024  Patient Name: Jake Dwyer,   Gender: male      Room: [facility]/007-A  MRN: [id number]  : 1935  (89 y.o.)  Referring Practitioner: Quintin Boudreaux MD  Diagnosis: s/p total knee arthroscopy  Additional Pertinent Hx: Jake Dwyer is a 89 y.o. 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 Elan Wilson on 2024.  He was allowed to weightbearing as tolerated on right lower extremity postoperatively.  On 2024 the patient exhibited symptomatic orthostatic hypotension during the PT therapy session.  He was treated with IV fluid and holding parameter was placed on his antihypertensive medications.    Restrictions/Precautions:  Restrictions/Precautions: Fall Risk, Up as Tolerated, Weight Bearing  Right Lower Extremity Weight Bearing: Weight Bearing As Tolerated  Position Activity Restriction  Other

## 2024-06-06 NOTE — PROGRESS NOTES
Patient discharged in stable condition as per order of attending physician to Home with Home Health Care per staff at Time: 11:20am to car.    AVS provided by RN at time of discharge, which includes all necessary medical information pertaining to the patients current course of illness, treatment, medications, post-discharge goals of care, and treatment preferences.     Patient/ family verbalize understanding of discharge plan and are in agreement with goal/plan/treatment preferences.     Belongings including Glasses, CPAP sent with patient.      Home medications sent home with patient yes (Simvastatin)    Availability of \"My Chart\" offered to patient as a tool for updated health record.  Steps for activation discussed with patient as mentioned on AVS.

## 2024-06-06 NOTE — PROGRESS NOTES
Patient to be discharged on Thursday, 6/6 to home. Patient will be under the supervision of his family. Family education was not provided. Patient will be receiving services through [facility]. SAIMA provided information to [place] on 6/3 via fax. SAIMA spoke with Crissy on this date to confirm patient's discharge. No outstanding needs.

## 2024-06-06 NOTE — PLAN OF CARE
Problem: Pain  Goal: Verbalizes/displays adequate comfort level or baseline comfort level  6/5/2024 2319 by Anny Keane RN  Outcome: Progressing  Flowsheets (Taken 6/5/2024 2319)  Verbalizes/displays adequate comfort level or baseline comfort level:   Encourage patient to monitor pain and request assistance   Assess pain using appropriate pain scale     Problem: Safety - Adult  Goal: Free from fall injury  6/5/2024 2319 by Anny Keane RN  Outcome: Progressing  Flowsheets (Taken 6/5/2024 2319)  Free From Fall Injury: Instruct family/caregiver on patient safety     Problem: ABCDS Injury Assessment  Goal: Absence of physical injury  6/5/2024 2319 by Anny Keane RN  Outcome: Progressing  Flowsheets (Taken 6/5/2024 2319)  Absence of Physical Injury: Implement safety measures based on patient assessment     Problem: Skin/Tissue Integrity - Adult  Goal: Skin integrity remains intact  6/5/2024 2319 by Anny Keane, RN  Outcome: Progressing  Flowsheets (Taken 6/5/2024 2319)  Skin Integrity Remains Intact: Monitor for areas of redness and/or skin breakdown

## 2025-05-16 ENCOUNTER — HOSPITAL ENCOUNTER (OUTPATIENT)
Age: 89
Discharge: HOME | End: 2025-05-16
Payer: MEDICARE

## 2025-05-16 LAB
ALBUMIN SERPL BCG-MCNC: 4.2 G/DL (ref 3.4–4.9)
ALP SERPL-CCNC: 137 U/L (ref 40–129)
ALT SERPL W/O P-5'-P-CCNC: 21 U/L (ref 10–50)
ANION GAP SERPL CALC-SCNC: 13 MEQ/L (ref 8–16)
AST SERPL-CCNC: 30 U/L (ref 10–50)
BASOPHILS ABSOLUTE: 0 THOU/MM3 (ref 0–0.1)
BASOPHILS NFR BLD AUTO: 0.5 %
BILIRUB CONJ SERPL-MCNC: 0.5 MG/DL (ref 0–0.2)
BILIRUB SERPL-MCNC: 1.3 MG/DL (ref 0.3–1.2)
BUN SERPL-MCNC: 21 MG/DL (ref 8–23)
CALCIUM SERPL-MCNC: 8.8 MG/DL (ref 8.2–9.6)
CHLORIDE SERPL-SCNC: 108 MEQ/L (ref 98–111)
CO2 SERPL-SCNC: 21 MEQ/L (ref 22–29)
CREAT SERPL-MCNC: 0.9 MG/DL (ref 0.7–1.2)
CREAT UR-MCNC: 157 MG/DL
DEPRECATED RDW RBC AUTO: 55.9 FL (ref 35–45)
EOSINOPHIL NFR BLD AUTO: 1.1 %
EOSINOPHILS ABSOLUTE: 0.1 THOU/MM3 (ref 0–0.4)
ERYTHROCYTE [DISTWIDTH] IN BLOOD BY AUTOMATED COUNT: 14.2 % (ref 11.5–14.5)
GFR SERPL CREATININE-BSD FRML MDRD: 81 ML/MIN/1.73M2
GLUCOSE FASTING: 127 MG/DL (ref 74–109)
HCT VFR BLD AUTO: 36.8 % (ref 42–52)
HGB BLD-MCNC: 12.3 GM/DL (ref 14–18)
IMM GRANULOCYTES # BLD AUTO: 0.03 THOU/MM3 (ref 0–0.07)
IMM GRANULOCYTES NFR BLD AUTO: 0.5 %
LYMPHOCYTES ABSOLUTE: 1.1 THOU/MM3 (ref 1–4.8)
LYMPHOCYTES NFR BLD AUTO: 17.1 %
MCH RBC QN AUTO: 36.2 PG (ref 26–33)
MCHC RBC AUTO-ENTMCNC: 33.4 GM/DL (ref 32.2–35.5)
MCV RBC AUTO: 108.2 FL (ref 80–94)
MICROALBUMIN UR-MCNC: 5.82 MG/DL
MICROALBUMIN/CREAT RATIO PNL UR: 37 MG/G (ref 0–30)
MONOCYTES ABSOLUTE: 0.6 THOU/MM3 (ref 0.4–1.3)
MONOCYTES NFR BLD AUTO: 9.5 %
NEUTROPHILS ABSOLUTE: 4.6 THOU/MM3 (ref 1.8–7.7)
NEUTROPHILS NFR BLD AUTO: 71.3 %
NRBC BLD AUTO-RTO: 0 /100 WBC
PLATELET # BLD AUTO: 228 THOU/MM3 (ref 130–400)
PMV BLD AUTO: 9.5 FL (ref 9.4–12.4)
POTASSIUM SERPL-SCNC: 4.3 MEQ/L (ref 3.5–5.2)
PROT SERPL-MCNC: 6.8 G/DL (ref 6.4–8.3)
RBC # BLD AUTO: 3.4 MILL/MM3 (ref 4.7–6.1)
SHBG SERPL-SCNC: 90 NMOL/L (ref 19–76)
SODIUM SERPL-SCNC: 142 MEQ/L (ref 135–145)
TESTOST FREE MFR SERPL: 7.8 PG/ML (ref 47–244)
TESTOST SERPL-MCNC: 87 NG/DL (ref 193–740)
WBC # BLD AUTO: 6.4 THOU/MM3 (ref 4.8–10.8)

## 2025-05-16 PROCEDURE — 82043 UR ALBUMIN QUANTITATIVE: CPT

## 2025-05-16 PROCEDURE — 84403 ASSAY OF TOTAL TESTOSTERONE: CPT

## 2025-05-16 PROCEDURE — 84402 ASSAY OF FREE TESTOSTERONE: CPT

## 2025-05-16 PROCEDURE — 80076 HEPATIC FUNCTION PANEL: CPT

## 2025-05-16 PROCEDURE — 84270 ASSAY OF SEX HORMONE GLOBUL: CPT

## 2025-05-16 PROCEDURE — 80048 BASIC METABOLIC PNL TOTAL CA: CPT

## 2025-05-16 PROCEDURE — 36415 COLL VENOUS BLD VENIPUNCTURE: CPT

## 2025-05-16 PROCEDURE — 85025 COMPLETE CBC W/AUTO DIFF WBC: CPT

## 2025-08-26 ENCOUNTER — HOSPITAL ENCOUNTER (OUTPATIENT)
Dept: OTHER | Age: 89
Discharge: HOME | End: 2025-08-26
Payer: MEDICARE

## 2025-08-26 LAB
BASOPHILS ABSOLUTE: 0 THOU/MM3 (ref 0–0.1)
BASOPHILS NFR BLD AUTO: 0.6 %
DEPRECATED RDW RBC AUTO: 57.1 FL (ref 35–45)
EOSINOPHIL NFR BLD AUTO: 3.1 %
EOSINOPHILS ABSOLUTE: 0.2 THOU/MM3 (ref 0–0.4)
ERYTHROCYTE [DISTWIDTH] IN BLOOD BY AUTOMATED COUNT: 14 % (ref 11.5–14.5)
FOLATE SERPL-MCNC: > 20 NG/ML (ref 4.6–34.8)
HCT VFR BLD AUTO: 38.6 % (ref 42–52)
HGB BLD-MCNC: 12.9 GM/DL (ref 14–18)
IMM GRANULOCYTES # BLD AUTO: 0.03 THOU/MM3 (ref 0–0.07)
IMM GRANULOCYTES NFR BLD AUTO: 0.5 %
LYMPHOCYTES ABSOLUTE: 1.3 THOU/MM3 (ref 1–4.8)
LYMPHOCYTES NFR BLD AUTO: 20.2 %
MCH RBC QN AUTO: 36.5 PG (ref 26–33)
MCHC RBC AUTO-ENTMCNC: 33.4 GM/DL (ref 32.2–35.5)
MCV RBC AUTO: 109.3 FL (ref 80–94)
MONOCYTES ABSOLUTE: 0.6 THOU/MM3 (ref 0.4–1.3)
MONOCYTES NFR BLD AUTO: 9.7 %
NEUTROPHILS ABSOLUTE: 4.3 THOU/MM3 (ref 1.8–7.7)
NEUTROPHILS NFR BLD AUTO: 65.9 %
NRBC BLD AUTO-RTO: 0 /100 WBC
PLATELET # BLD AUTO: 251 THOU/MM3 (ref 130–400)
PMV BLD AUTO: 9.3 FL (ref 9.4–12.4)
RBC # BLD AUTO: 3.53 MILL/MM3 (ref 4.7–6.1)
VIT B12 SERPL-MCNC: 1394 PG/ML (ref 232–1245)
WBC # BLD AUTO: 6.5 THOU/MM3 (ref 4.8–10.8)

## 2025-08-26 PROCEDURE — 85025 COMPLETE CBC W/AUTO DIFF WBC: CPT

## 2025-08-26 PROCEDURE — 36415 COLL VENOUS BLD VENIPUNCTURE: CPT

## 2025-08-26 PROCEDURE — 82607 VITAMIN B-12: CPT

## 2025-08-26 PROCEDURE — 82746 ASSAY OF FOLIC ACID SERUM: CPT

## (undated) DEVICE — GLOVE SURG SZ 8 L11.77IN FNGR THK9.8MIL STRW LTX POLYMER

## (undated) DEVICE — BANDAGE,GAUZE,4.5"X4.1YD,STERILE,LF: Brand: MEDLINE

## (undated) DEVICE — SUTURE MCRYL SZ 4-0 L18IN ABSRB UD P-3 L13MM 3/8 CIR PRIM Y494G

## (undated) DEVICE — PACK PROCEDURE SURG PLAS SC MIN SRHP LF

## (undated) DEVICE — COTTON BALL ST

## (undated) DEVICE — GLOVE ORANGE PI 7   MSG9070